# Patient Record
Sex: MALE | Race: BLACK OR AFRICAN AMERICAN | Employment: OTHER
[De-identification: names, ages, dates, MRNs, and addresses within clinical notes are randomized per-mention and may not be internally consistent; named-entity substitution may affect disease eponyms.]

---

## 2024-10-23 ENCOUNTER — APPOINTMENT (OUTPATIENT)
Facility: HOSPITAL | Age: 69
End: 2024-10-23
Attending: EMERGENCY MEDICINE
Payer: MEDICARE

## 2024-10-23 ENCOUNTER — HOSPITAL ENCOUNTER (INPATIENT)
Facility: HOSPITAL | Age: 69
LOS: 23 days | Discharge: HOME | End: 2024-11-15
Attending: EMERGENCY MEDICINE | Admitting: HOSPITALIST
Payer: MEDICARE

## 2024-10-23 ENCOUNTER — APPOINTMENT (OUTPATIENT)
Facility: HOSPITAL | Age: 69
End: 2024-10-23
Payer: MEDICARE

## 2024-10-23 DIAGNOSIS — C34.92 CARCINOMA OF LEFT LUNG (HCC): Primary | ICD-10-CM

## 2024-10-23 DIAGNOSIS — E87.6 HYPOKALEMIA: ICD-10-CM

## 2024-10-23 DIAGNOSIS — J96.01 ACUTE RESPIRATORY FAILURE WITH HYPOXEMIA: ICD-10-CM

## 2024-10-23 DIAGNOSIS — J90 LARGE PLEURAL EFFUSION: ICD-10-CM

## 2024-10-23 DIAGNOSIS — J91.0 MALIGNANT PLEURAL EFFUSION: ICD-10-CM

## 2024-10-23 DIAGNOSIS — J43.9 BULLOUS EMPHYSEMA (HCC): ICD-10-CM

## 2024-10-23 DIAGNOSIS — J18.9 PNEUMONIA OF LEFT LUNG DUE TO INFECTIOUS ORGANISM, UNSPECIFIED PART OF LUNG: ICD-10-CM

## 2024-10-23 DIAGNOSIS — J86.9 EMPYEMA LUNG (HCC): ICD-10-CM

## 2024-10-23 DIAGNOSIS — J18.9 SEPSIS DUE TO PNEUMONIA (HCC): ICD-10-CM

## 2024-10-23 DIAGNOSIS — A41.9 SEPSIS DUE TO PNEUMONIA (HCC): ICD-10-CM

## 2024-10-23 PROBLEM — D75.839 THROMBOCYTOSIS: Status: ACTIVE | Noted: 2024-10-23

## 2024-10-23 PROBLEM — I10 PRIMARY HYPERTENSION: Status: ACTIVE | Noted: 2024-10-23

## 2024-10-23 PROBLEM — D72.10 EOSINOPHILIA: Status: ACTIVE | Noted: 2024-10-23

## 2024-10-23 PROBLEM — E78.5 HLD (HYPERLIPIDEMIA): Status: ACTIVE | Noted: 2024-10-23

## 2024-10-23 PROBLEM — R65.20 SEVERE SEPSIS (HCC): Status: ACTIVE | Noted: 2024-10-23

## 2024-10-23 LAB
ALBUMIN SERPL-MCNC: 2.2 G/DL (ref 3.4–5)
ALBUMIN/GLOB SERPL: 0.4 (ref 0.8–1.7)
ALP SERPL-CCNC: 71 U/L (ref 45–117)
ALT SERPL-CCNC: 12 U/L (ref 16–61)
ANION GAP SERPL CALC-SCNC: 11 MMOL/L (ref 3–18)
APPEARANCE UR: CLEAR
APTT PPP: 35.8 SEC (ref 23–36.4)
AST SERPL-CCNC: 17 U/L (ref 10–38)
BACTERIA URNS QL MICRO: NEGATIVE /HPF
BASOPHILS # BLD: 0.2 K/UL (ref 0–0.1)
BASOPHILS NFR BLD: 1 % (ref 0–2)
BILIRUB SERPL-MCNC: 0.6 MG/DL (ref 0.2–1)
BILIRUB UR QL: NEGATIVE
BUN SERPL-MCNC: 12 MG/DL (ref 7–18)
BUN/CREAT SERPL: 14 (ref 12–20)
CALCIUM SERPL-MCNC: 8.9 MG/DL (ref 8.5–10.1)
CHLORIDE SERPL-SCNC: 106 MMOL/L (ref 100–111)
CO2 SERPL-SCNC: 24 MMOL/L (ref 21–32)
COLOR UR: YELLOW
CREAT SERPL-MCNC: 0.87 MG/DL (ref 0.6–1.3)
DIFFERENTIAL METHOD BLD: ABNORMAL
EOSINOPHIL # BLD: 5.5 K/UL (ref 0–0.4)
EOSINOPHIL NFR BLD: 29 % (ref 0–5)
ERYTHROCYTE [DISTWIDTH] IN BLOOD BY AUTOMATED COUNT: 12.6 % (ref 11.6–14.5)
FLUAV RNA SPEC QL NAA+PROBE: NOT DETECTED
FLUBV RNA SPEC QL NAA+PROBE: NOT DETECTED
GLOBULIN SER CALC-MCNC: 5.2 G/DL (ref 2–4)
GLUCOSE SERPL-MCNC: 109 MG/DL (ref 74–99)
GLUCOSE UR STRIP.AUTO-MCNC: NEGATIVE MG/DL
HCT VFR BLD AUTO: 33.6 % (ref 36–48)
HGB BLD-MCNC: 11.1 G/DL (ref 13–16)
HGB UR QL STRIP: ABNORMAL
IMM GRANULOCYTES # BLD AUTO: 0.1 K/UL (ref 0–0.04)
IMM GRANULOCYTES NFR BLD AUTO: 0 % (ref 0–0.5)
INR PPP: 1.2 (ref 0.9–1.1)
KETONES UR QL STRIP.AUTO: ABNORMAL MG/DL
LACTATE BLD-SCNC: 1.27 MMOL/L (ref 0.4–2)
LACTATE BLD-SCNC: 2.11 MMOL/L (ref 0.4–2)
LEUKOCYTE ESTERASE UR QL STRIP.AUTO: ABNORMAL
LYMPHOCYTES # BLD: 2.5 K/UL (ref 0.9–3.6)
LYMPHOCYTES NFR BLD: 13 % (ref 21–52)
MAGNESIUM SERPL-MCNC: 2 MG/DL (ref 1.6–2.6)
MCH RBC QN AUTO: 29.8 PG (ref 24–34)
MCHC RBC AUTO-ENTMCNC: 33 G/DL (ref 31–37)
MCV RBC AUTO: 90.1 FL (ref 78–100)
MONOCYTES # BLD: 1.5 K/UL (ref 0.05–1.2)
MONOCYTES NFR BLD: 8 % (ref 3–10)
NEUTS SEG # BLD: 9.1 K/UL (ref 1.8–8)
NEUTS SEG NFR BLD: 49 % (ref 40–73)
NITRITE UR QL STRIP.AUTO: NEGATIVE
NRBC # BLD: 0 K/UL (ref 0–0.01)
NRBC BLD-RTO: 0 PER 100 WBC
NT PRO BNP: 143 PG/ML (ref 0–900)
PH UR STRIP: 5 (ref 5–8)
PLATELET # BLD AUTO: 634 K/UL (ref 135–420)
PMV BLD AUTO: 8.7 FL (ref 9.2–11.8)
POTASSIUM SERPL-SCNC: 3.1 MMOL/L (ref 3.5–5.5)
PROCALCITONIN SERPL-MCNC: 0.21 NG/ML
PROT SERPL-MCNC: 7.4 G/DL (ref 6.4–8.2)
PROT UR STRIP-MCNC: 30 MG/DL
PROTHROMBIN TIME: 15.3 SEC (ref 11.9–14.9)
RBC # BLD AUTO: 3.73 M/UL (ref 4.35–5.65)
RBC #/AREA URNS HPF: NORMAL /HPF (ref 0–5)
SARS-COV-2 RNA RESP QL NAA+PROBE: NOT DETECTED
SODIUM SERPL-SCNC: 141 MMOL/L (ref 136–145)
SOURCE: NORMAL
SP GR UR REFRACTOMETRY: >1.03 (ref 1–1.03)
TROPONIN I SERPL HS-MCNC: 8 NG/L (ref 0–78)
TSH SERPL DL<=0.05 MIU/L-ACNC: 0.32 UIU/ML (ref 0.36–3.74)
UROBILINOGEN UR QL STRIP.AUTO: 1 EU/DL (ref 0.2–1)
WBC # BLD AUTO: 18.8 K/UL (ref 4.6–13.2)
WBC URNS QL MICRO: NORMAL /HPF (ref 0–4)

## 2024-10-23 PROCEDURE — 71045 X-RAY EXAM CHEST 1 VIEW: CPT

## 2024-10-23 PROCEDURE — 2580000003 HC RX 258: Performed by: EMERGENCY MEDICINE

## 2024-10-23 PROCEDURE — 96365 THER/PROPH/DIAG IV INF INIT: CPT

## 2024-10-23 PROCEDURE — 83605 ASSAY OF LACTIC ACID: CPT

## 2024-10-23 PROCEDURE — 6360000002 HC RX W HCPCS: Performed by: EMERGENCY MEDICINE

## 2024-10-23 PROCEDURE — 99285 EMERGENCY DEPT VISIT HI MDM: CPT

## 2024-10-23 PROCEDURE — 80053 COMPREHEN METABOLIC PANEL: CPT

## 2024-10-23 PROCEDURE — 71275 CT ANGIOGRAPHY CHEST: CPT

## 2024-10-23 PROCEDURE — 85730 THROMBOPLASTIN TIME PARTIAL: CPT

## 2024-10-23 PROCEDURE — 85025 COMPLETE CBC W/AUTO DIFF WBC: CPT

## 2024-10-23 PROCEDURE — 1100000003 HC PRIVATE W/ TELEMETRY

## 2024-10-23 PROCEDURE — 84484 ASSAY OF TROPONIN QUANT: CPT

## 2024-10-23 PROCEDURE — 96366 THER/PROPH/DIAG IV INF ADDON: CPT

## 2024-10-23 PROCEDURE — 6370000000 HC RX 637 (ALT 250 FOR IP): Performed by: PHYSICIAN ASSISTANT

## 2024-10-23 PROCEDURE — 6360000004 HC RX CONTRAST MEDICATION: Performed by: EMERGENCY MEDICINE

## 2024-10-23 PROCEDURE — 83880 ASSAY OF NATRIURETIC PEPTIDE: CPT

## 2024-10-23 PROCEDURE — 96368 THER/DIAG CONCURRENT INF: CPT

## 2024-10-23 PROCEDURE — 2580000003 HC RX 258: Performed by: PHYSICIAN ASSISTANT

## 2024-10-23 PROCEDURE — 93005 ELECTROCARDIOGRAM TRACING: CPT | Performed by: EMERGENCY MEDICINE

## 2024-10-23 PROCEDURE — 83735 ASSAY OF MAGNESIUM: CPT

## 2024-10-23 PROCEDURE — 84443 ASSAY THYROID STIM HORMONE: CPT

## 2024-10-23 PROCEDURE — 85610 PROTHROMBIN TIME: CPT

## 2024-10-23 PROCEDURE — 6370000000 HC RX 637 (ALT 250 FOR IP): Performed by: EMERGENCY MEDICINE

## 2024-10-23 PROCEDURE — 87636 SARSCOV2 & INF A&B AMP PRB: CPT

## 2024-10-23 PROCEDURE — 94761 N-INVAS EAR/PLS OXIMETRY MLT: CPT

## 2024-10-23 PROCEDURE — 2700000000 HC OXYGEN THERAPY PER DAY

## 2024-10-23 PROCEDURE — 84145 PROCALCITONIN (PCT): CPT

## 2024-10-23 PROCEDURE — 99223 1ST HOSP IP/OBS HIGH 75: CPT | Performed by: PHYSICIAN ASSISTANT

## 2024-10-23 PROCEDURE — 81001 URINALYSIS AUTO W/SCOPE: CPT

## 2024-10-23 PROCEDURE — 87040 BLOOD CULTURE FOR BACTERIA: CPT

## 2024-10-23 RX ORDER — ENOXAPARIN SODIUM 100 MG/ML
40 INJECTION SUBCUTANEOUS DAILY
Status: DISCONTINUED | OUTPATIENT
Start: 2024-10-24 | End: 2024-11-16 | Stop reason: HOSPADM

## 2024-10-23 RX ORDER — POTASSIUM CHLORIDE 1500 MG/1
40 TABLET, EXTENDED RELEASE ORAL PRN
Status: ACTIVE | OUTPATIENT
Start: 2024-10-23 | End: 2024-11-09

## 2024-10-23 RX ORDER — POTASSIUM CHLORIDE 1500 MG/1
40 TABLET, EXTENDED RELEASE ORAL
Status: COMPLETED | OUTPATIENT
Start: 2024-10-23 | End: 2024-10-23

## 2024-10-23 RX ORDER — VANCOMYCIN 1.75 G/350ML
1250 INJECTION, SOLUTION INTRAVENOUS
Status: DISCONTINUED | OUTPATIENT
Start: 2024-10-24 | End: 2024-10-24

## 2024-10-23 RX ORDER — ONDANSETRON 4 MG/1
4 TABLET, ORALLY DISINTEGRATING ORAL EVERY 8 HOURS PRN
Status: DISCONTINUED | OUTPATIENT
Start: 2024-10-23 | End: 2024-11-16 | Stop reason: HOSPADM

## 2024-10-23 RX ORDER — POTASSIUM CHLORIDE 7.45 MG/ML
10 INJECTION INTRAVENOUS PRN
Status: ACTIVE | OUTPATIENT
Start: 2024-10-23 | End: 2024-11-09

## 2024-10-23 RX ORDER — ACETAMINOPHEN 650 MG/1
650 SUPPOSITORY RECTAL EVERY 6 HOURS PRN
Status: DISCONTINUED | OUTPATIENT
Start: 2024-10-23 | End: 2024-11-16 | Stop reason: HOSPADM

## 2024-10-23 RX ORDER — BISACODYL 10 MG
10 SUPPOSITORY, RECTAL RECTAL DAILY PRN
Status: DISCONTINUED | OUTPATIENT
Start: 2024-10-23 | End: 2024-11-16 | Stop reason: HOSPADM

## 2024-10-23 RX ORDER — POTASSIUM CHLORIDE 1500 MG/1
40 TABLET, EXTENDED RELEASE ORAL ONCE
Status: DISCONTINUED | OUTPATIENT
Start: 2024-10-23 | End: 2024-10-23

## 2024-10-23 RX ORDER — FUROSEMIDE 10 MG/ML
40 INJECTION INTRAMUSCULAR; INTRAVENOUS
Status: COMPLETED | OUTPATIENT
Start: 2024-10-23 | End: 2024-10-23

## 2024-10-23 RX ORDER — SODIUM CHLORIDE 0.9 % (FLUSH) 0.9 %
5-40 SYRINGE (ML) INJECTION PRN
Status: DISCONTINUED | OUTPATIENT
Start: 2024-10-23 | End: 2024-11-16 | Stop reason: HOSPADM

## 2024-10-23 RX ORDER — IPRATROPIUM BROMIDE AND ALBUTEROL SULFATE 2.5; .5 MG/3ML; MG/3ML
1 SOLUTION RESPIRATORY (INHALATION)
Status: COMPLETED | OUTPATIENT
Start: 2024-10-23 | End: 2024-10-23

## 2024-10-23 RX ORDER — POLYETHYLENE GLYCOL 3350 17 G/17G
17 POWDER, FOR SOLUTION ORAL DAILY PRN
Status: DISCONTINUED | OUTPATIENT
Start: 2024-10-23 | End: 2024-11-16 | Stop reason: HOSPADM

## 2024-10-23 RX ORDER — ARFORMOTEROL TARTRATE 15 UG/2ML
15 SOLUTION RESPIRATORY (INHALATION)
Status: DISCONTINUED | OUTPATIENT
Start: 2024-10-23 | End: 2024-11-16 | Stop reason: HOSPADM

## 2024-10-23 RX ORDER — IOPAMIDOL 755 MG/ML
80 INJECTION, SOLUTION INTRAVASCULAR
Status: COMPLETED | OUTPATIENT
Start: 2024-10-23 | End: 2024-10-23

## 2024-10-23 RX ORDER — ATORVASTATIN CALCIUM 10 MG/1
10 TABLET, FILM COATED ORAL DAILY
COMMUNITY
Start: 2024-08-05

## 2024-10-23 RX ORDER — VANCOMYCIN 1.75 G/350ML
1250 INJECTION, SOLUTION INTRAVENOUS ONCE
Status: COMPLETED | OUTPATIENT
Start: 2024-10-23 | End: 2024-10-23

## 2024-10-23 RX ORDER — 0.9 % SODIUM CHLORIDE 0.9 %
500 INTRAVENOUS SOLUTION INTRAVENOUS ONCE
Status: COMPLETED | OUTPATIENT
Start: 2024-10-23 | End: 2024-10-23

## 2024-10-23 RX ORDER — MAGNESIUM SULFATE IN WATER 40 MG/ML
2000 INJECTION, SOLUTION INTRAVENOUS PRN
Status: DISCONTINUED | OUTPATIENT
Start: 2024-10-23 | End: 2024-11-16 | Stop reason: HOSPADM

## 2024-10-23 RX ORDER — ATORVASTATIN CALCIUM 10 MG/1
10 TABLET, FILM COATED ORAL NIGHTLY
Status: DISCONTINUED | OUTPATIENT
Start: 2024-10-24 | End: 2024-11-16 | Stop reason: HOSPADM

## 2024-10-23 RX ORDER — LOSARTAN POTASSIUM AND HYDROCHLOROTHIAZIDE 25; 100 MG/1; MG/1
1 TABLET ORAL DAILY
COMMUNITY
Start: 2024-08-05

## 2024-10-23 RX ORDER — BUDESONIDE 1 MG/2ML
1 INHALANT ORAL 2 TIMES DAILY
Status: DISCONTINUED | OUTPATIENT
Start: 2024-10-23 | End: 2024-11-16 | Stop reason: HOSPADM

## 2024-10-23 RX ORDER — SODIUM CHLORIDE 0.9 % (FLUSH) 0.9 %
5-40 SYRINGE (ML) INJECTION EVERY 12 HOURS SCHEDULED
Status: DISCONTINUED | OUTPATIENT
Start: 2024-10-23 | End: 2024-11-16 | Stop reason: HOSPADM

## 2024-10-23 RX ORDER — ACETAMINOPHEN 325 MG/1
650 TABLET ORAL EVERY 6 HOURS PRN
Status: DISCONTINUED | OUTPATIENT
Start: 2024-10-23 | End: 2024-11-16 | Stop reason: HOSPADM

## 2024-10-23 RX ORDER — SODIUM CHLORIDE 9 MG/ML
INJECTION, SOLUTION INTRAVENOUS PRN
Status: DISCONTINUED | OUTPATIENT
Start: 2024-10-23 | End: 2024-11-16 | Stop reason: HOSPADM

## 2024-10-23 RX ORDER — 0.9 % SODIUM CHLORIDE 0.9 %
500 INTRAVENOUS SOLUTION INTRAVENOUS ONCE
Status: DISCONTINUED | OUTPATIENT
Start: 2024-10-23 | End: 2024-10-23

## 2024-10-23 RX ORDER — ONDANSETRON 2 MG/ML
4 INJECTION INTRAMUSCULAR; INTRAVENOUS EVERY 6 HOURS PRN
Status: DISCONTINUED | OUTPATIENT
Start: 2024-10-23 | End: 2024-11-16 | Stop reason: HOSPADM

## 2024-10-23 RX ADMIN — FUROSEMIDE 40 MG: 10 INJECTION, SOLUTION INTRAMUSCULAR; INTRAVENOUS at 18:30

## 2024-10-23 RX ADMIN — SODIUM CHLORIDE 500 ML: 9 INJECTION, SOLUTION INTRAVENOUS at 16:45

## 2024-10-23 RX ADMIN — WATER 80 MG: 1 INJECTION INTRAMUSCULAR; INTRAVENOUS; SUBCUTANEOUS at 18:46

## 2024-10-23 RX ADMIN — PIPERACILLIN AND TAZOBACTAM 4500 MG: 4; .5 INJECTION, POWDER, FOR SOLUTION INTRAVENOUS at 14:44

## 2024-10-23 RX ADMIN — POTASSIUM CHLORIDE 40 MEQ: 1500 TABLET, EXTENDED RELEASE ORAL at 16:46

## 2024-10-23 RX ADMIN — POTASSIUM CHLORIDE 40 MEQ: 1500 TABLET, EXTENDED RELEASE ORAL at 22:36

## 2024-10-23 RX ADMIN — PIPERACILLIN AND TAZOBACTAM 3375 MG: 3; .375 INJECTION, POWDER, FOR SOLUTION INTRAVENOUS at 21:30

## 2024-10-23 RX ADMIN — IPRATROPIUM BROMIDE AND ALBUTEROL SULFATE 1 DOSE: .5; 3 SOLUTION RESPIRATORY (INHALATION) at 18:46

## 2024-10-23 RX ADMIN — IOPAMIDOL 80 ML: 755 INJECTION, SOLUTION INTRAVENOUS at 14:26

## 2024-10-23 RX ADMIN — Medication 500 ML: at 14:44

## 2024-10-23 RX ADMIN — VANCOMYCIN 1250 MG: 1.75 INJECTION, SOLUTION INTRAVENOUS at 15:22

## 2024-10-23 RX ADMIN — SODIUM CHLORIDE, PRESERVATIVE FREE 10 ML: 5 INJECTION INTRAVENOUS at 22:42

## 2024-10-23 ASSESSMENT — PAIN SCALES - GENERAL
PAINLEVEL_OUTOF10: 10
PAINLEVEL_OUTOF10: 0

## 2024-10-23 ASSESSMENT — PAIN - FUNCTIONAL ASSESSMENT: PAIN_FUNCTIONAL_ASSESSMENT: 0-10

## 2024-10-23 ASSESSMENT — PAIN DESCRIPTION - LOCATION: LOCATION: CHEST;BACK

## 2024-10-23 NOTE — ED PROVIDER NOTES
[facility] EMERGENCY DEPT  EMERGENCY DEPARTMENT HISTORY AND PHYSICAL EXAM      Pt Name: Delfin Shetty  MRN: [id number]  Birthdate 1955  Date of evaluation: 10/23/2024  Provider: Ricardo Harvey DO    CHIEF COMPLAINT       Chief Complaint   Patient presents with    Shortness of Breath    Chest Pain         HISTORY OF PRESENT ILLNESS   (Location/Symptom, Timing/Onset, Context/Setting, Quality, Duration, Modifying Factors, Severity)  Note limiting factors.   Delfin Shetty is a 68 y.o. male with past history of nicotine dependence who presents to the emergency department with chief complaint of worsening shortness of breath especially with exertion over the past 2 weeks.  He has associated cough and nonradiating chest pain.  He does state that the pain goes to his upper back as well.  He says that he sees his primary care provider a couple times a year.  He reports that he quit smoking about 6 weeks ago.  He denies any lung disease, no COPD or asthma to his knowledge.  He reports that he does take high blood pressure medication and cholesterol medication.  No dizziness, nausea, sweating, leg swelling or leg pain, flank pain, abdominal pain, focal weakness, numbness, and no other complaint.    The history is provided by the patient. No  was used.       Nursing Notes were reviewed.    REVIEW OF SYSTEMS    (2-9 systems for level 4, 10 or more for level 5)     Review of Systems   Constitutional:  Negative for chills and fever.   HENT:  Positive for congestion. Negative for rhinorrhea, sore throat and trouble swallowing.    Eyes:  Negative for visual disturbance.   Respiratory:  Positive for cough and shortness of breath. Negative for wheezing.    Cardiovascular:  Positive for chest pain. Negative for palpitations and leg swelling.   Gastrointestinal:  Negative for abdominal distention, abdominal pain, constipation, diarrhea, nausea and vomiting.   Endocrine: Negative for cold intolerance.  deficit.      Sensory: No sensory deficit.      Motor: No weakness.      Coordination: Coordination normal.      Comments: Strength 5 out of 5 and symmetric  No slurred speech, no facial droop  CN II to XII grossly intact  No nystagmus  NIH stroke scale score 0   Psychiatric:         Mood and Affect: Mood normal.         Behavior: Behavior normal.         Thought Content: Thought content normal.         DIAGNOSTIC RESULTS     EKG: All EKG's are interpreted by the Emergency Department Physician who either signs or Co-signs this chart in the absence of a cardiologist.        RADIOLOGY:   Non-plain film images such as CT, Ultrasound and MRI are read by the radiologist. Plain radiographic images are visualized and preliminarily interpreted by the emergency physician with the below findings:      Interpretation per the Radiologist below, if available at the time of this note:    CTA CHEST W WO CONTRAST   Final Result      No evidence of pulmonary embolus or right heart strain.      There is a huge relatively loculated subpulmonic pleural effusion in the lower   chest depressing an inverting the left hemidiaphragm and displace the abdominal   contents. There is complete atelectasis of the left lower lobe and much of the   lingula. There is a combination of atelectasis and peripheral consolidation   involving the left upper lobe with additional peripheral somewhat loculated   fluid. There is a somewhat masslike consolidation in the lateral left apex which   is somewhat for possible mass and should be monitored on follow-up.      Diffuse emphysematous and some bullous changes are seen in the right lung.      The mediastinal contents have been shifted completely into the right chest.   There is no definite evidence of mediastinal adenopathy but the left hilum is   distorted and could obscure some lymph nodes.         ===================   Note: [facility] maintains that all CT scans at their facilities   are

## 2024-10-23 NOTE — ED TRIAGE NOTES
Patient reports having chest/upper GI pain x2-3 weeks. States that he has also been shortness of breath in this time. States that also notes food has been getting stuck in his chest when eating.

## 2024-10-23 NOTE — ED NOTES
Assumed care of patient at this time. Patient resting comfortably on bed. No s/sx of acute distress. Remains on 3L O2 via nasal cannula, cardiac, BP and pulse ox monitors. IV site without any s/sx of infection/infiltration. Awaiting transfer to [facility]. Denies any needs at this time.

## 2024-10-24 ENCOUNTER — APPOINTMENT (OUTPATIENT)
Facility: HOSPITAL | Age: 69
End: 2024-10-24
Payer: MEDICARE

## 2024-10-24 PROBLEM — J90 LARGE PLEURAL EFFUSION: Status: ACTIVE | Noted: 2024-10-24

## 2024-10-24 PROBLEM — J18.9 PNEUMONIA OF LEFT LUNG DUE TO INFECTIOUS ORGANISM: Status: ACTIVE | Noted: 2024-10-24

## 2024-10-24 PROBLEM — J43.9 BULLOUS EMPHYSEMA (HCC): Status: ACTIVE | Noted: 2024-10-24

## 2024-10-24 PROBLEM — J86.9 EMPYEMA LUNG (HCC): Status: ACTIVE | Noted: 2024-10-24

## 2024-10-24 LAB
ANION GAP SERPL CALC-SCNC: 7 MMOL/L (ref 3–18)
APPEARANCE FLD: CLEAR
BASOPHILS # BLD: 0.1 K/UL (ref 0–0.1)
BASOPHILS NFR BLD: 0 % (ref 0–2)
BODY FLD TYPE: NORMAL
BUN SERPL-MCNC: 14 MG/DL (ref 7–18)
BUN/CREAT SERPL: 18 (ref 12–20)
CALCIUM SERPL-MCNC: 8.9 MG/DL (ref 8.5–10.1)
CHLORIDE SERPL-SCNC: 108 MMOL/L (ref 100–111)
CO2 SERPL-SCNC: 24 MMOL/L (ref 21–32)
COLOR FLD: YELLOW
CREAT SERPL-MCNC: 0.8 MG/DL (ref 0.6–1.3)
CYTOLOGY-NON GYN: NORMAL
DIFFERENTIAL METHOD BLD: ABNORMAL
EKG ATRIAL RATE: 140 BPM
EKG DIAGNOSIS: NORMAL
EKG P AXIS: 44 DEGREES
EKG P-R INTERVAL: 124 MS
EKG Q-T INTERVAL: 278 MS
EKG QRS DURATION: 60 MS
EKG QTC CALCULATION (BAZETT): 424 MS
EKG R AXIS: 57 DEGREES
EKG T AXIS: 14 DEGREES
EKG VENTRICULAR RATE: 140 BPM
EOSINOPHIL # BLD: 0.2 K/UL (ref 0–0.4)
EOSINOPHIL NFR BLD: 1 % (ref 0–5)
EOSINOPHIL NFR FLD MANUAL: 37 %
ERYTHROCYTE [DISTWIDTH] IN BLOOD BY AUTOMATED COUNT: 12.8 % (ref 11.6–14.5)
GLUCOSE FLD-MCNC: 90 MG/DL
GLUCOSE SERPL-MCNC: 133 MG/DL (ref 74–99)
HCT VFR BLD AUTO: 31.8 % (ref 36–48)
HGB BLD-MCNC: 10.1 G/DL (ref 13–16)
HIV 1+2 AB+HIV1 P24 AG SERPL QL IA: NONREACTIVE
HIV 1/2 RESULT COMMENT: NORMAL
IMM GRANULOCYTES # BLD AUTO: 0.1 K/UL (ref 0–0.04)
IMM GRANULOCYTES NFR BLD AUTO: 0 % (ref 0–0.5)
LDH FLD L TO P-CCNC: 303 U/L
LDH SERPL L TO P-CCNC: 168 U/L (ref 81–234)
LYMPHOCYTES # BLD: 1 K/UL (ref 0.9–3.6)
LYMPHOCYTES NFR BLD: 9 % (ref 21–52)
LYMPHOCYTES NFR FLD: 61 %
MCH RBC QN AUTO: 29.1 PG (ref 24–34)
MCHC RBC AUTO-ENTMCNC: 31.8 G/DL (ref 31–37)
MCV RBC AUTO: 91.6 FL (ref 78–100)
MONOCYTES # BLD: 0.4 K/UL (ref 0.05–1.2)
MONOCYTES NFR BLD: 3 % (ref 3–10)
MONOCYTES NFR FLD: 2 %
NEUTROPHILS NFR FLD: 0 % (ref 0–25)
NEUTS BAND # FLD: 0 %
NEUTS SEG # BLD: 10.6 K/UL (ref 1.8–8)
NEUTS SEG NFR BLD: 87 % (ref 40–73)
NRBC # BLD: 0 K/UL (ref 0–0.01)
NRBC BLD-RTO: 0 PER 100 WBC
NUC CELL # FLD: 1105 /CU MM
PH FLD: 7.4
PLATELET # BLD AUTO: 568 K/UL (ref 135–420)
PMV BLD AUTO: 9 FL (ref 9.2–11.8)
POTASSIUM SERPL-SCNC: 4.3 MMOL/L (ref 3.5–5.5)
PROCALCITONIN SERPL-MCNC: 0.22 NG/ML
PROT FLD-MCNC: 3.8 G/DL
PROT SERPL-MCNC: 6.8 G/DL (ref 6.4–8.2)
RBC # BLD AUTO: 3.47 M/UL (ref 4.35–5.65)
RBC # FLD: 5000 /CU MM
SODIUM SERPL-SCNC: 139 MMOL/L (ref 136–145)
SPECIMEN SOURCE FLD: ABNORMAL
SPECIMEN SOURCE FLD: NORMAL
WBC # BLD AUTO: 12.3 K/UL (ref 4.6–13.2)

## 2024-10-24 PROCEDURE — 85025 COMPLETE CBC W/AUTO DIFF WBC: CPT

## 2024-10-24 PROCEDURE — 88334 PATH CONSLTJ SURG CYTO XM EA: CPT

## 2024-10-24 PROCEDURE — 83986 ASSAY PH BODY FLUID NOS: CPT

## 2024-10-24 PROCEDURE — 0BBP3ZX EXCISION OF LEFT PLEURA, PERCUTANEOUS APPROACH, DIAGNOSTIC: ICD-10-PCS | Performed by: STUDENT IN AN ORGANIZED HEALTH CARE EDUCATION/TRAINING PROGRAM

## 2024-10-24 PROCEDURE — 87102 FUNGUS ISOLATION CULTURE: CPT

## 2024-10-24 PROCEDURE — 1100000003 HC PRIVATE W/ TELEMETRY

## 2024-10-24 PROCEDURE — 99223 1ST HOSP IP/OBS HIGH 75: CPT | Performed by: INTERNAL MEDICINE

## 2024-10-24 PROCEDURE — 6370000000 HC RX 637 (ALT 250 FOR IP): Performed by: PHYSICIAN ASSISTANT

## 2024-10-24 PROCEDURE — 88341 IMHCHEM/IMCYTCHM EA ADD ANTB: CPT

## 2024-10-24 PROCEDURE — 88112 CYTOPATH CELL ENHANCE TECH: CPT

## 2024-10-24 PROCEDURE — 83615 LACTATE (LD) (LDH) ENZYME: CPT

## 2024-10-24 PROCEDURE — 88342 IMHCHEM/IMCYTCHM 1ST ANTB: CPT

## 2024-10-24 PROCEDURE — 89051 BODY FLUID CELL COUNT: CPT

## 2024-10-24 PROCEDURE — 2580000003 HC RX 258: Performed by: PHYSICIAN ASSISTANT

## 2024-10-24 PROCEDURE — 87389 HIV-1 AG W/HIV-1&-2 AB AG IA: CPT

## 2024-10-24 PROCEDURE — 84157 ASSAY OF PROTEIN OTHER: CPT

## 2024-10-24 PROCEDURE — 99152 MOD SED SAME PHYS/QHP 5/>YRS: CPT

## 2024-10-24 PROCEDURE — 6370000000 HC RX 637 (ALT 250 FOR IP): Performed by: STUDENT IN AN ORGANIZED HEALTH CARE EDUCATION/TRAINING PROGRAM

## 2024-10-24 PROCEDURE — 88333 PATH CONSLTJ SURG CYTO XM 1: CPT

## 2024-10-24 PROCEDURE — 87070 CULTURE OTHR SPECIMN AEROBIC: CPT

## 2024-10-24 PROCEDURE — 6360000002 HC RX W HCPCS: Performed by: PHYSICIAN ASSISTANT

## 2024-10-24 PROCEDURE — 88360 TUMOR IMMUNOHISTOCHEM/MANUAL: CPT

## 2024-10-24 PROCEDURE — 71045 X-RAY EXAM CHEST 1 VIEW: CPT

## 2024-10-24 PROCEDURE — 88305 TISSUE EXAM BY PATHOLOGIST: CPT

## 2024-10-24 PROCEDURE — 84155 ASSAY OF PROTEIN SERUM: CPT

## 2024-10-24 PROCEDURE — 2700000000 HC OXYGEN THERAPY PER DAY

## 2024-10-24 PROCEDURE — 94640 AIRWAY INHALATION TREATMENT: CPT

## 2024-10-24 PROCEDURE — 6360000002 HC RX W HCPCS: Performed by: STUDENT IN AN ORGANIZED HEALTH CARE EDUCATION/TRAINING PROGRAM

## 2024-10-24 PROCEDURE — 0W9B30Z DRAINAGE OF LEFT PLEURAL CAVITY WITH DRAINAGE DEVICE, PERCUTANEOUS APPROACH: ICD-10-PCS | Performed by: STUDENT IN AN ORGANIZED HEALTH CARE EDUCATION/TRAINING PROGRAM

## 2024-10-24 PROCEDURE — 84145 PROCALCITONIN (PCT): CPT

## 2024-10-24 PROCEDURE — 87205 SMEAR GRAM STAIN: CPT

## 2024-10-24 PROCEDURE — 87206 SMEAR FLUORESCENT/ACID STAI: CPT

## 2024-10-24 PROCEDURE — 99233 SBSQ HOSP IP/OBS HIGH 50: CPT | Performed by: STUDENT IN AN ORGANIZED HEALTH CARE EDUCATION/TRAINING PROGRAM

## 2024-10-24 PROCEDURE — 82945 GLUCOSE OTHER FLUID: CPT

## 2024-10-24 PROCEDURE — 94761 N-INVAS EAR/PLS OXIMETRY MLT: CPT

## 2024-10-24 PROCEDURE — 36415 COLL VENOUS BLD VENIPUNCTURE: CPT

## 2024-10-24 PROCEDURE — 87116 MYCOBACTERIA CULTURE: CPT

## 2024-10-24 PROCEDURE — 93010 ELECTROCARDIOGRAM REPORT: CPT | Performed by: INTERNAL MEDICINE

## 2024-10-24 PROCEDURE — 80048 BASIC METABOLIC PNL TOTAL CA: CPT

## 2024-10-24 RX ORDER — FENTANYL CITRATE 50 UG/ML
INJECTION, SOLUTION INTRAMUSCULAR; INTRAVENOUS
Status: DISPENSED
Start: 2024-10-24 | End: 2024-10-24

## 2024-10-24 RX ORDER — FENTANYL CITRATE 50 UG/ML
INJECTION, SOLUTION INTRAMUSCULAR; INTRAVENOUS PRN
Status: COMPLETED | OUTPATIENT
Start: 2024-10-24 | End: 2024-10-24

## 2024-10-24 RX ORDER — MIDAZOLAM HYDROCHLORIDE 1 MG/ML
INJECTION, SOLUTION INTRAMUSCULAR; INTRAVENOUS
Status: DISPENSED
Start: 2024-10-24 | End: 2024-10-24

## 2024-10-24 RX ORDER — MIDAZOLAM HYDROCHLORIDE 2 MG/2ML
INJECTION, SOLUTION INTRAMUSCULAR; INTRAVENOUS PRN
Status: COMPLETED | OUTPATIENT
Start: 2024-10-24 | End: 2024-10-24

## 2024-10-24 RX ORDER — MULTIVITAMIN WITH IRON
1 TABLET ORAL DAILY
Status: DISCONTINUED | OUTPATIENT
Start: 2024-10-24 | End: 2024-11-16 | Stop reason: HOSPADM

## 2024-10-24 RX ADMIN — FENTANYL CITRATE 25 MCG: 50 INJECTION, SOLUTION INTRAMUSCULAR; INTRAVENOUS at 10:41

## 2024-10-24 RX ADMIN — IPRATROPIUM BROMIDE 0.5 MG: 0.5 SOLUTION RESPIRATORY (INHALATION) at 16:57

## 2024-10-24 RX ADMIN — PIPERACILLIN AND TAZOBACTAM 3375 MG: 3; .375 INJECTION, POWDER, LYOPHILIZED, FOR SOLUTION INTRAVENOUS at 21:18

## 2024-10-24 RX ADMIN — PIPERACILLIN AND TAZOBACTAM 3375 MG: 3; .375 INJECTION, POWDER, LYOPHILIZED, FOR SOLUTION INTRAVENOUS at 13:09

## 2024-10-24 RX ADMIN — MIDAZOLAM HYDROCHLORIDE 0.5 MG: 1 INJECTION, SOLUTION INTRAMUSCULAR; INTRAVENOUS at 11:13

## 2024-10-24 RX ADMIN — MIDAZOLAM HYDROCHLORIDE 0.5 MG: 1 INJECTION, SOLUTION INTRAMUSCULAR; INTRAVENOUS at 10:51

## 2024-10-24 RX ADMIN — IPRATROPIUM BROMIDE 0.5 MG: 0.5 SOLUTION RESPIRATORY (INHALATION) at 13:01

## 2024-10-24 RX ADMIN — ARFORMOTEROL TARTRATE 15 MCG: 15 SOLUTION RESPIRATORY (INHALATION) at 09:27

## 2024-10-24 RX ADMIN — BUDESONIDE 1 MG: 1 SUSPENSION RESPIRATORY (INHALATION) at 19:51

## 2024-10-24 RX ADMIN — FENTANYL CITRATE 25 MCG: 50 INJECTION, SOLUTION INTRAMUSCULAR; INTRAVENOUS at 11:13

## 2024-10-24 RX ADMIN — FENTANYL CITRATE 25 MCG: 50 INJECTION, SOLUTION INTRAMUSCULAR; INTRAVENOUS at 10:47

## 2024-10-24 RX ADMIN — MIDAZOLAM HYDROCHLORIDE 0.5 MG: 1 INJECTION, SOLUTION INTRAMUSCULAR; INTRAVENOUS at 11:01

## 2024-10-24 RX ADMIN — THERA TABS 1 TABLET: TAB at 16:14

## 2024-10-24 RX ADMIN — ACETAMINOPHEN 325MG 650 MG: 325 TABLET ORAL at 16:52

## 2024-10-24 RX ADMIN — SODIUM CHLORIDE, PRESERVATIVE FREE 10 ML: 5 INJECTION INTRAVENOUS at 21:11

## 2024-10-24 RX ADMIN — IPRATROPIUM BROMIDE 0.5 MG: 0.5 SOLUTION RESPIRATORY (INHALATION) at 09:26

## 2024-10-24 RX ADMIN — BUDESONIDE 1 MG: 1 SUSPENSION RESPIRATORY (INHALATION) at 09:27

## 2024-10-24 RX ADMIN — IPRATROPIUM BROMIDE 0.5 MG: 0.5 SOLUTION RESPIRATORY (INHALATION) at 19:51

## 2024-10-24 RX ADMIN — ARFORMOTEROL TARTRATE 15 MCG: 15 SOLUTION RESPIRATORY (INHALATION) at 19:51

## 2024-10-24 RX ADMIN — PIPERACILLIN AND TAZOBACTAM 3375 MG: 3; .375 INJECTION, POWDER, LYOPHILIZED, FOR SOLUTION INTRAVENOUS at 05:36

## 2024-10-24 RX ADMIN — ATORVASTATIN CALCIUM 10 MG: 10 TABLET, FILM COATED ORAL at 21:11

## 2024-10-24 RX ADMIN — SODIUM CHLORIDE, PRESERVATIVE FREE 10 ML: 5 INJECTION INTRAVENOUS at 08:00

## 2024-10-24 RX ADMIN — VANCOMYCIN 1250 MG: 1.75 INJECTION, SOLUTION INTRAVENOUS at 05:42

## 2024-10-24 ASSESSMENT — PAIN SCALES - GENERAL
PAINLEVEL_OUTOF10: 0
PAINLEVEL_OUTOF10: 3
PAINLEVEL_OUTOF10: 0
PAINLEVEL_OUTOF10: 0

## 2024-10-24 ASSESSMENT — PAIN DESCRIPTION - LOCATION: LOCATION: RIB CAGE

## 2024-10-24 ASSESSMENT — PAIN DESCRIPTION - DESCRIPTORS: DESCRIPTORS: ACHING

## 2024-10-24 ASSESSMENT — PAIN DESCRIPTION - ONSET: ONSET: PROGRESSIVE

## 2024-10-24 ASSESSMENT — PAIN DESCRIPTION - ORIENTATION: ORIENTATION: LEFT

## 2024-10-24 ASSESSMENT — PAIN DESCRIPTION - PAIN TYPE: TYPE: ACUTE PAIN

## 2024-10-24 ASSESSMENT — PAIN DESCRIPTION - FREQUENCY: FREQUENCY: INTERMITTENT

## 2024-10-24 ASSESSMENT — PAIN - FUNCTIONAL ASSESSMENT: PAIN_FUNCTIONAL_ASSESSMENT: PREVENTS OR INTERFERES SOME ACTIVE ACTIVITIES AND ADLS

## 2024-10-24 NOTE — CONSULTS
Infectious Disease Consultation Note        Reason: Evaluate for severe sepsis/empyema    Current abx Prior abx   Pip/tazo, vancomycin since 10/23/24      Lines:       Assessment :   68 y.o. male with a PMHx of tobacco abuse, HTN, HLD who presented to the ED on 10/23/24 with c/o SOB associated with cough and chest pain ongoing for the past 2 weeks.    I agree that initial clinical presentation is concerning for sepsis/empyema.  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    Status post thoracentesis, chest tube insertion on 10/24/2024.  No neutrophils seen on pleural fluid argues against empyema.  Pleural fluid cultures 10/24-pending    Recommendations:    Continue with piperacillin/tazobactam.  Discontinue vancomycin.  Follow-up pleural fluid cultures.  Modify antibiotics accordingly  Management of chest tube per pulmonary/IR  Follow-up results of pleural biopsy  Further recommendations based on above test results, clinical course    Thank you for consultation request. Above plan was discussed in details with patient, primary team. Please call me if any further questions or concerns. Will continue to participate in the care of this patient.    HPI:     68 y.o. male with a PMHx of tobacco abuse, HTN, HLD who presented to the ED on 10/23/24 with c/o SOB associated with cough and chest discomfort.    Patient states that he has noted gradually worsening shortness of breath since the past 3 months.  He did not seek medical attention.  It progressively worsened in the past 2 weeks.  He started getting more short of breath with minimal exertion.  Hence came to the emergency room on 10/23.In the ED, RR 30-40, -140s, BP ranging 155/75 to 104/60. He was initially 88% on room air, he was started on nasal cannula. Labs  nasopharyngeal specimens.         Blood Culture 1 [[id number]] Collected: 10/23/24 1330    Order Status: Completed Specimen: Blood Updated: 10/24/24 0749     Special Requests NO SPECIAL REQUESTS        Culture NO GROWTH AFTER 16 HOURS                   RADIOLOGY:    All available imaging studies/reports in [facility] for this admission were reviewed      Disclaimer: Sections of this note are dictated utilizing voice recognition software, which may have resulted in some phonetic based errors in grammar and contents. Even though attempts were made to correct all the mistakes, some may have been missed, and remained in the body of the document. If questions arise, please contact our department.    Dr. Adelia Recinos, Infectious Disease Specialist  [phone]  October 24, 2024  10:16 AM

## 2024-10-24 NOTE — PROGRESS NOTES
Pt not seen for skilled PT due to:    Discussed with Dr. Geronimo. Plans for chest tube placement in setting of symptomatic L pleural effusion. Additionally, tachycardic at rest on HFNC. Will hold PT evaluation s/p chest tube placement.    Will f/u later as schedule allows. Thank you.  Kassi Mccarthy, SEGUNDOT

## 2024-10-24 NOTE — PROGRESS NOTES
New OT orders received and chart reviewed. Unable to see pt for OT evaluation at this time due to:    Holding OT today per MD, pt is pending chest tube placement, noted to be tachycardic at rest with -110 bpm, on HFNC.    Will follow up later as pt's schedule allows. Thank you for this referral.    Gertrudis Grajeda MS, OTR/L

## 2024-10-24 NOTE — ED NOTES
TRANSFER - OUT REPORT:    Verbal report given to Sarah De Los Santos RN on Delfin Shetty  being transferred to [facility] for routine progression of patient care       Report consisted of patient's Situation, Background, Assessment and   Recommendations(SBAR).     Information from the following report(s) ED Encounter Summary, Intake/Output, MAR, and Recent Results was reviewed with the receiving nurse.    Brandon Fall Assessment:    Presents to emergency department  because of falls (Syncope, seizure, or loss of consciousness): No  Age > 70: No  Altered Mental Status, Intoxication with alcohol or substance confusion (Disorientation, impaired judgment, poor safety awaremess, or inability to follow instructions): No  Impaired Mobility: Ambulates or transfers with assistive devices or assistance; Unable to ambulate or transer.: No  Nursing Judgement: Yes          Lines:   Peripheral IV 10/23/24 Left Antecubital (Active)        Opportunity for questions and clarification was provided.      Patient transported with:  Monitor and O2 @ 3lpm

## 2024-10-24 NOTE — PROGRESS NOTES
TRANSFER - OUT REPORT:    Verbal report given to Sheryl RAMIREZ on [facility]  being transferred to  for routine post-op       Report consisted of patient's Situation, Background, Assessment and   Recommendations(SBAR).     Information from the following report(s) Nurse Handoff Report was reviewed with the receiving nurse.           Lines:   Peripheral IV 10/23/24 Left Antecubital (Active)   Site Assessment Clean, dry & intact 10/24/24 0800   Line Status Capped;Flushed 10/24/24 0800   Line Care Cap changed;Connections checked and tightened;Ports disinfected 10/24/24 0800   Phlebitis Assessment No symptoms 10/24/24 0800   Infiltration Assessment 0 10/24/24 0800   Alcohol Cap Used Yes 10/24/24 0800   Dressing Status Clean, dry & intact 10/24/24 0800   Dressing Type Transparent 10/24/24 0800       Peripheral IV 10/24/24 Right Antecubital (Active)   Site Assessment Clean, dry & intact 10/24/24 0800   Line Status Infusing;Flushed 10/24/24 0800   Line Care Cap changed;Connections checked and tightened;Ports disinfected 10/24/24 0800   Phlebitis Assessment No symptoms 10/24/24 0800   Infiltration Assessment 0 10/24/24 0800   Alcohol Cap Used Yes 10/24/24 0800   Dressing Status Clean, dry & intact 10/24/24 0800   Dressing Type Transparent 10/24/24 0800        Opportunity for questions and clarification was provided.      Patient transported with:  Monitor, Registered Nurse, and Tech

## 2024-10-24 NOTE — CARE COORDINATION
10/24/24 1544   Service Assessment   Patient Orientation Alert and Oriented;Person;Place;Situation   Cognition Alert   History Provided By Patient   Primary Caregiver Self   Support Systems Spouse/Significant Other;Family Members   Patient's Healthcare Decision Maker is: Legal Next of Kin   PCP Verified by CM Yes   Last Visit to PCP Within last 3 months   Prior Functional Level Independent in ADLs/IADLs   Current Functional Level Independent in ADLs/IADLs   Can patient return to prior living arrangement Yes   Ability to make needs known: Good   Family able to assist with home care needs: Yes   Financial Resources Medicare   Community Resources None   Social/Functional History   Lives With Alone   Type of Home   (10x10 shed)   Home Layout One level   Home Access Stairs to enter without rails   Entrance Stairs - Number of Steps 2   Bathroom Shower/Tub Tub only   Bathroom Toilet Standard   Bathroom Equipment Shower chair   Bathroom Accessibility Accessible   Home Equipment None   Receives Help From Family   ADL Assistance Independent   Homemaking Assistance Independent   Homemaking Responsibilities Yes   Ambulation Assistance Independent   Transfer Assistance Independent   Active  No   Patient's  Info family transports   Occupation On disability   Discharge Planning   Type of Residence   (10x10 shed)   Living Arrangements Alone   Current Services Prior To Admission None   DME Ordered? No   Potential Assistance Purchasing Medications No   History of falls? 0   Services At/After Discharge   Transition of Care Consult (CM Consult) Discharge Planning   Services At/After Discharge None    Resource Information Provided? No   Mode of Transport at Discharge Other (see comment)  (Family will transport)   Confirm Follow Up Transport Self   Condition of Participation: Discharge Planning   The Plan for Transition of Care is related to the following treatment goals: Dispo home with family to transport

## 2024-10-24 NOTE — PROCEDURES
RADIOLOGY POST PROCEDURE NOTE     October 24, 2024       11:33 AM     Preoperative Diagnosis:   Left pleural mass/metastatic disease, left pleural effusion    Postoperative Diagnosis:  Same.    :  Oc Gramajo MD    Assistant:  None.    Type of Anesthesia: 1% plain lidocaine, moderate sedation    Procedure/Description:  CT guided left pleural mass biopsy, CT guided left chest tube placement    Findings:   Six 20 G cores obtained from left pleural mass, pathology present.     16 Fr left chest tube placed for large left effusion. Approximately 130 mL of cloudy yellow fluid was aspirated with sample sent for evaluation.    Estimated blood Loss:  Minimal    Specimen Removed:  yes    Blood transfusions:  None.    Implants:  None.    Complications: None    Condition: Stable    Discharge Plan:   Return to nursing unit. 3 hours bedrest.   Please only allow 1000 mL of pleural fluid to drain via the chest tube initially to help limit reexpansion pulmonary edema. Chest tube management per primary service.     OC GRAMAJO MD

## 2024-10-24 NOTE — PROGRESS NOTES
[facility]   Pharmacy Pharmacokinetic Monitoring Service - Vancomycin    Indication:  empyema  Target Concentration: Goal AUC/KEVYN 400-600 mg*hr/L  Day of Therapy: 2  Additional Antimicrobials: Piperacillin/Tazobactam    Pertinent Laboratory Values:   Temp: 98.5 °F (36.9 °C), Weight - Scale: 58.5 kg (128 lb 15.5 oz)  Recent Labs     10/23/24  1315 10/24/24  0103   CREATININE 0.87 0.80   BUN 12 14   WBC 18.8* 12.3   PROCAL 0.21 0.22       Estimated Creatinine Clearance: 72 mL/min (based on SCr of 0.8 mg/dL).    Pertinent Cultures:  Culture Date Source Results   10/23 blood NGTD   MRSA Nasal Swab: Not ordered. Order placed by pharmacy, awaiting results    Assessment:  Date/Time Current Dose Concentration Timing of Concentration (h) AUC   10/23 1,250mg x1 - - -   10/24 1,250mg q18h - - -     Note: Serum concentrations collected for AUC dosing may appear elevated if collected in close proximity to the dose administered, this is not necessarily an indication of toxicity    Plan:  Continue vancomycin 1,250mg q18h  Projected AUCss/T = 491/11.2  Renal labs as indicated   Vancomycin concentration ordered for  10/25 @ 0600  Pharmacy will continue to monitor patient and adjust therapy as indicated    Thank you for the consult,  Blanquita Bowman RPH  10/24/2024

## 2024-10-24 NOTE — CONSULTS
[facility]  Pulmonary, Critical Care, and Sleep Medicine    Initial Patient Consult    Name: Delfin Shetty MRN: [id number]   : 1955 Hospital: [facility]   Date: 10/24/2024        IMPRESSION:   Large L pleural effusion with loculations and tension contralateral mediastinal shift. Concern for mesothelioma given appearance of pleura or metastatic lung cancer given mass like consolidation L apex. 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 Pleural biopsy with concerns for mesothelioma   Caution with withdrawing large volumes at one time due to concerns with reexpansion pulmonary edema. Chest tube currently clamped and should remain so overnight. Will unclamp in am. Discussed with bedside nurse  Titrate supplemental O2 to saturation greater than 90%  Assess home O2 needs prior to discharge  Bronchodilators prn  Baseline PFT as outpatient  Encourage IS and bronchial hygiene  Empiric antibiotics Zosyn/Vanco and streamline to cultures  Received 1 dose of systemic steroids in ED. No indication for continued steroid use  Prophylaxis issues per primary team  Further intervention pending thoracentesis and pleural biopsy results, including intrapleural lytics     Subjective:     This patient has been seen and evaluated at the request of Dr. Harvey for pleural effusion. Patient is a 68 y.o. male admitted with shortness of breath, increased cough and chest pain x 2 weeks. Chest pain with pleuritic component. Other symptoms include

## 2024-10-24 NOTE — CONSULTS
Interventional Radiology Consult Note  Patient: Delfin Shetty               Sex: male          DOA: 10/23/2024       YOB: 1955      Age:  68 y.o.        LOS:  LOS: 1 day              Assessment   Imaging concerning for metastatic disease - mesothelioma v lung cancer with pleural mets  Large left loculated pleural effusion  Unintentional weight loss  COPD    Left pleural biopsy with left chest tube placement is indicated to aid in diagnostics and guide further management as well as to alleviate symptomatic left pleural effusion.    Case and images reviewed by Dr. Gramajo. Discussed with Dr. Geronimo.    Plan     Image guided left pleural biopsy and left chest tube placement with moderate sedation as schedule allows 10/24/24    Specimen orders in place per referring MD.     Thank you,  Arlene Phillips, PA  [phone]    HPI:     Consult for evaluation of large left pleural effusion received from Dr. Geronimo and reviewed with Dr. Gramajo.    Delfin Shetty is a 68 y.o. male with a PMH of tobacco use, HTN, HLD who presented to [facility] on 10/23/24 for dyspnea with cough and chest pain.  ED evaluation was significant for CT 10/23/24 showing large left pleural effusion. Labs were significant for WBCs 18.8. Patient was admitted for further evaluation and management and placed on supplemental oxygen with SpO2 88% on arrival.  Not on blood thinning medication.     Today the patient reports ongoing dry cough, epigastric/low anterior \"chest pain,\" palpitations x 1 week, unintentional weight loss of over 30 pounds in the last month due to sensation of fullness. Denies nausea, vomiting, HA, dizziness, hemoptysis, or current dyspnea on supplemental O2.    The anticipated procedure was discussed in detail including risk of injury, infection, and bleeding. All questions were answered and concerns addressed. Informed consents were obtained.    Past Medical History:   Diagnosis Date    HLD (hyperlipidemia) 10/23/2024  NAD  Respiratory: increased work of breathing on NC  Cardiovascular: tachycardic and regular  Gastrointestinal: Soft, NT/ND  Extremities: Moves all four, clubbing present  Skin: Warm and dry    Labs Reviewed:  CMP:   Lab Results   Component Value Date/Time    Sodium 139 10/24/2024 01:03 AM    Potassium 4.3 10/24/2024 01:03 AM    Chloride 108 10/24/2024 01:03 AM    CO2 24 10/24/2024 01:03 AM    BUN 14 10/24/2024 01:03 AM    Magnesium 2.0 10/23/2024 01:15 PM    Globulin 5.2 (H) 10/23/2024 01:15 PM    ALT 12 (L) 10/23/2024 01:15 PM   ]    CBC:   Lab Results   Component Value Date/Time    WBC 12.3 10/24/2024 01:03 AM    Hemoglobin 10.1 (L) 10/24/2024 01:03 AM    Hematocrit 31.8 (L) 10/24/2024 01:03 AM    Platelets 568 (H) 10/24/2024 01:03 AM   ]    COAGS:   Lab Results   Component Value Date/Time    APTT 35.8 10/23/2024 01:15 PM    INR 1.2 (H) 10/23/2024 01:15 PM   ]    Blood Thinners:  None     ASA 3 Mallampati 2

## 2024-10-24 NOTE — PLAN OF CARE
Problem: Discharge Planning  Goal: Discharge to home or other facility with appropriate resources  Outcome: Progressing  Flowsheets (Taken 10/23/2024 2115)  Discharge to home or other facility with appropriate resources:   Identify barriers to discharge with patient and caregiver   Identify discharge learning needs (meds, wound care, etc)     Problem: Pain  Goal: Verbalizes/displays adequate comfort level or baseline comfort level  Outcome: Progressing     Problem: Safety - Adult  Goal: Free from fall injury  Outcome: Progressing     Problem: Skin/Tissue Integrity  Goal: Absence of new skin breakdown  Description: 1.  Monitor for areas of redness and/or skin breakdown  2.  Assess vascular access sites hourly  3.  Every 4-6 hours minimum:  Change oxygen saturation probe site  4.  Every 4-6 hours:  If on nasal continuous positive airway pressure, respiratory therapy assess nares and determine need for appliance change or resting period.  Outcome: Progressing     Problem: Respiratory - Adult  Goal: Achieves optimal ventilation and oxygenation  Outcome: Progressing     Problem: Cardiovascular - Adult  Goal: Maintains optimal cardiac output and hemodynamic stability  Outcome: Progressing  Goal: Absence of cardiac dysrhythmias or at baseline  Outcome: Progressing     Problem: Skin/Tissue Integrity - Adult  Goal: Skin integrity remains intact  Outcome: Progressing  Goal: Incisions, wounds, or drain sites healing without S/S of infection  Outcome: Progressing  Goal: Oral mucous membranes remain intact  Outcome: Progressing     Problem: Musculoskeletal - Adult  Goal: Return mobility to safest level of function  Outcome: Progressing  Goal: Maintain proper alignment of affected body part  Outcome: Progressing  Goal: Return ADL status to a safe level of function  Outcome: Progressing     Problem: Gastrointestinal - Adult  Goal: Maintains or returns to baseline bowel function  Outcome: Progressing  Goal: Maintains adequate

## 2024-10-24 NOTE — PROGRESS NOTES
0730:  Report received, care assumed.  0800:  Assessment completed. Bedrest on High flow NC. NPO for procedure today. AAOx4. Denies pain. Verbalizes understanding plan of care.   0930: TRANSFER - OUT REPORT:  Verbal report given to ASHLEY Jade on [facility]  being transferred to Angio lab for ordered procedure     Report consisted of patient's Situation, Background, Assessment and   Recommendations(SBAR).   Information from the following report(s) Nurse Handoff Report was reviewed with the receiving nurse.     Lines:   Peripheral IV 10/23/24 Left Antecubital (Active)   Site Assessment Clean, dry & intact 10/24/24 0351   Line Status Flushed;Normal saline locked 10/24/24 0351   Line Care Cap changed;Connections checked and tightened 10/24/24 0351   Phlebitis Assessment No symptoms 10/24/24 0351   Infiltration Assessment 0 10/24/24 0351   Alcohol Cap Used Yes 10/24/24 0351   Dressing Status Clean, dry & intact 10/24/24 0351   Dressing Type Transparent 10/24/24 0351       Peripheral IV 10/24/24 Right Antecubital (Active)    Opportunity for questions and clarification was provided.    Patient transported with:  Monitor and Registered Nurse 100% NRB.    0945:  RT changed oxygen delivery from HF to regular NC at 4L/min. Pt verbalize understanding to call RN for any discomforts/ sob. Awaiting transport to Angio lab  1030: Transported by bed with monitor, oxygen, RN to CT lab. Report updated to ASHLEY Jade.  1200:  TRANSFER - IN REPORT:  Verbal report received from ASHLEY Hawkins on [facility]  being received from CT Angio procedure for routine post-op    Report consisted of patient's Situation, Background, Assessment and   Recommendations(SBAR).   Information from the following report(s) Nurse Handoff Report was reviewed with the receiving nurse.  Opportunity for questions and clarification was provided.    Assessment completed upon patient's arrival to unit and care assumed.   1220:  Settled into room. Left #16fr Chest

## 2024-10-24 NOTE — CONSULTS
Comprehensive Nutrition Assessment    Type and Reason for Visit:  Initial, Consult    Nutrition Recommendations/Plan:   Continue current diet as tolerated.  Order Ensure Plus High Protein (each provides 350 kcal, 20g protein) TID.  Encourage PO intake.  Recommend/order MVI/min supplementation daily due to reported poor PO.  Daily wts.  Continue to monitor tolerance of PO, compliance of oral supplements, weight, labs, and plan of care during admission.     Malnutrition Assessment:  Malnutrition Status:  Insufficient data (10/24/24 1527)    Context:  Chronic Illness       Nutrition History and Allergies:      Past Medical History:   Diagnosis Date    HLD (hyperlipidemia) 10/23/2024    Primary hypertension 10/23/2024     Weight Hx: Limited hx available. If chart review, reported wt loss accurate, wt loss of 18.9% x 3 months - significant  Wt Readings from Last 10 Encounters:   10/24/24 58.5 kg (128 lb 15.5 oz)     NFPE: unable to perform NFPE. Food Allergies: NKFA    Nutrition Assessment:    Admitted for c/o SOB, cough and chest pain. Per chart review, pt reports not eating well recently due to early satiety with reported 30 lb unintentional wt loss x 3 months. Pt seen for - Consult: Poor intake/appetite. Pt was off unit at time of visit, down at radiology. Noted PO diet was advanced. Will add oral supplements to increase calorie/protein intake opportunity. Attempt to obtain subjective information as able.    Nutrition Related Findings:    Pertinent Meds:  Reviewed  Pertinent Labs:  Recent Labs     10/23/24  1315 10/24/24  0103   GLUCOSE 109* 133*   BUN 12 14   CREATININE 0.87 0.80    139   K 3.1* 4.3    108   CO2 24 24   CALCIUM 8.9 8.9   MG 2.0  --      No results for input(s): \"POCGLU\" in the last 72 hours.    Last BM: Last BM (including prior to admit): 10/22/24     Skin: Wound Type: None       Edema: Edema: None     Current Nutrition Intake & Therapies:    Average Meal Intake:  (PO diet just

## 2024-10-24 NOTE — PROGRESS NOTES
Patient transitioned from hfnc to 4 liters cannula   10/24/24 0944   Oxygen Therapy/Pulse Ox   O2 Device Nasal cannula   O2 Flow Rate (L/min) 4 L/min   FiO2  36 %   SpO2 96 %

## 2024-10-24 NOTE — PROGRESS NOTES
[facility]  Progress Note    Patient: Delfin Shetty Age: 68 y.o. : 1955 MR#: [id number] SSN: [id number]  Date/Time: 10/24/2024    Subjective:   Subjective   No acute events overnight, no new concerns or complaints, vitals stable.  Received chest tube today by IR    Review of Systems   Constitutional: Negative for fever.      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  Had a total of 1 L out at procedure and 1 more liter out while transferring patient back to room.  Tube clamped after that.  Please continue to have chest tube clamped at this time, do not want to pull significant volumes of fluid given the possibility of reexpansion flash pulmonary edema  Pulmonology on board to assist with multiple pulmonary issues including chest tube, appreciate their expertise as always  Infectious disease on board given initial presentation and concern for sepsis as a possible cause.  Continue Zosyn for time being given possibility of postobstructive pneumonia.    Follow-up pleural cultures/biopsies and fluid studies    Wean oxygen as tolerated    Disposition: Expected location: Home     Expected discharge date: 10/28/2024      Case discussed with:  [x]Patient  []Family  [x]Nursing  [x]Case Management  DVT Prophylaxis:  [x]Lovenox  []Hep SQ  [x]SCDs  []Coumadin   []On Heparin gtt   [] DOAC    Objective:   Objective:  General Appearance:  In no acute distress, not in pain, uncomfortable and ill-appearing.    Vital signs: (most recent): Blood pressure (!) 154/83,

## 2024-10-24 NOTE — PROGRESS NOTES
conducted an initial consultation and Spiritual Assessment for Delfin Shetty, who is a 68 y.o.,male. Patient's Primary Language is: English.   According to the patient's EMR Buddhism Affiliation is: Scientologist.     The reason the Patient came to the hospital is:   Patient Active Problem List    Diagnosis Date Noted    Severe sepsis (HCC) 10/23/2024    Primary hypertension 10/23/2024    HLD (hyperlipidemia) 10/23/2024    Acute respiratory failure with hypoxia 10/23/2024    Hypokalemia 10/23/2024    Eosinophilia 10/23/2024    Thrombocytosis 10/23/2024        The  provided the following Interventions:  Initiated a relationship of care and support. Patient alert and calm. Very receptive. Patient has issues with family but feels he is well taken care of. Lie is meaningful. Patient feeling better since admission. Explored issues of marcella, belief, spirituality and Cheondoism/ritual needs while hospitalized.  Listened empathically.  Provided information about Spiritual Care Services. Patient expressed gratitude for support and visit.   Offered prayer and assurance of continued prayers on patient's behalf.   Chart reviewed.     Cesar Booker   Staff   Spiritual Health  [phone]    Spiritual Health History and Assessment/Progress Note  [facility]    Initial Encounter,  ,  ,      Name: Delfin Shetty MRN: [id number]    Age: 68 y.o.     Sex: male   Language: English   Baptism: Scientologist   Severe sepsis (HCC)     Date: 10/24/2024            Total Time Calculated: 8 min              Spiritual Assessment began in [address]        Referral/Consult From: Rounding   Encounter Overview/Reason: Initial Encounter  Service Provided For: Patient    Marcella, Belief, Meaning:   Patient identifies as spiritual and has beliefs or practices that help with coping during difficult times  Family/Friends No family/friends present      Importance and Influence:  Patient has

## 2024-10-25 ENCOUNTER — APPOINTMENT (OUTPATIENT)
Facility: HOSPITAL | Age: 69
End: 2024-10-25
Payer: MEDICARE

## 2024-10-25 PROBLEM — J96.01 ACUTE RESPIRATORY FAILURE WITH HYPOXEMIA: Status: ACTIVE | Noted: 2024-10-25

## 2024-10-25 LAB
ANION GAP SERPL CALC-SCNC: 6 MMOL/L (ref 3–18)
BACTERIA SPEC CULT: NORMAL
BACTERIA SPEC CULT: NORMAL
BASOPHILS # BLD: 0.1 K/UL (ref 0–0.1)
BASOPHILS NFR BLD: 0 % (ref 0–2)
BUN SERPL-MCNC: 14 MG/DL (ref 7–18)
BUN/CREAT SERPL: 19 (ref 12–20)
CALCIUM SERPL-MCNC: 9.3 MG/DL (ref 8.5–10.1)
CHLORIDE SERPL-SCNC: 108 MMOL/L (ref 100–111)
CO2 SERPL-SCNC: 25 MMOL/L (ref 21–32)
CREAT SERPL-MCNC: 0.72 MG/DL (ref 0.6–1.3)
DIFFERENTIAL METHOD BLD: ABNORMAL
EOSINOPHIL # BLD: 0.6 K/UL (ref 0–0.4)
EOSINOPHIL NFR BLD: 4 % (ref 0–5)
ERYTHROCYTE [DISTWIDTH] IN BLOOD BY AUTOMATED COUNT: 12.8 % (ref 11.6–14.5)
GLUCOSE SERPL-MCNC: 85 MG/DL (ref 74–99)
HCT VFR BLD AUTO: 35.1 % (ref 36–48)
HGB BLD-MCNC: 11 G/DL (ref 13–16)
IMM GRANULOCYTES # BLD AUTO: 0.1 K/UL (ref 0–0.04)
IMM GRANULOCYTES NFR BLD AUTO: 1 % (ref 0–0.5)
LYMPHOCYTES # BLD: 2 K/UL (ref 0.9–3.6)
LYMPHOCYTES NFR BLD: 13 % (ref 21–52)
MCH RBC QN AUTO: 29.2 PG (ref 24–34)
MCHC RBC AUTO-ENTMCNC: 31.3 G/DL (ref 31–37)
MCV RBC AUTO: 93.1 FL (ref 78–100)
MONOCYTES # BLD: 1.3 K/UL (ref 0.05–1.2)
MONOCYTES NFR BLD: 9 % (ref 3–10)
NEUTS SEG # BLD: 10.5 K/UL (ref 1.8–8)
NEUTS SEG NFR BLD: 72 % (ref 40–73)
NRBC # BLD: 0 K/UL (ref 0–0.01)
NRBC BLD-RTO: 0 PER 100 WBC
PLATELET # BLD AUTO: 620 K/UL (ref 135–420)
PMV BLD AUTO: 9.5 FL (ref 9.2–11.8)
POTASSIUM SERPL-SCNC: 4 MMOL/L (ref 3.5–5.5)
RBC # BLD AUTO: 3.77 M/UL (ref 4.35–5.65)
SERVICE CMNT-IMP: NORMAL
SODIUM SERPL-SCNC: 139 MMOL/L (ref 136–145)
WBC # BLD AUTO: 14.5 K/UL (ref 4.6–13.2)

## 2024-10-25 PROCEDURE — 97166 OT EVAL MOD COMPLEX 45 MIN: CPT

## 2024-10-25 PROCEDURE — 97116 GAIT TRAINING THERAPY: CPT

## 2024-10-25 PROCEDURE — 99232 SBSQ HOSP IP/OBS MODERATE 35: CPT | Performed by: INTERNAL MEDICINE

## 2024-10-25 PROCEDURE — 94640 AIRWAY INHALATION TREATMENT: CPT

## 2024-10-25 PROCEDURE — 6360000002 HC RX W HCPCS: Performed by: PHYSICIAN ASSISTANT

## 2024-10-25 PROCEDURE — 2580000003 HC RX 258: Performed by: PHYSICIAN ASSISTANT

## 2024-10-25 PROCEDURE — 71045 X-RAY EXAM CHEST 1 VIEW: CPT

## 2024-10-25 PROCEDURE — 97162 PT EVAL MOD COMPLEX 30 MIN: CPT

## 2024-10-25 PROCEDURE — 97535 SELF CARE MNGMENT TRAINING: CPT

## 2024-10-25 PROCEDURE — 6370000000 HC RX 637 (ALT 250 FOR IP): Performed by: PHYSICIAN ASSISTANT

## 2024-10-25 PROCEDURE — 94669 MECHANICAL CHEST WALL OSCILL: CPT

## 2024-10-25 PROCEDURE — 36415 COLL VENOUS BLD VENIPUNCTURE: CPT

## 2024-10-25 PROCEDURE — 32551 INSERTION OF CHEST TUBE: CPT

## 2024-10-25 PROCEDURE — 2700000000 HC OXYGEN THERAPY PER DAY

## 2024-10-25 PROCEDURE — 94761 N-INVAS EAR/PLS OXIMETRY MLT: CPT

## 2024-10-25 PROCEDURE — 99232 SBSQ HOSP IP/OBS MODERATE 35: CPT | Performed by: STUDENT IN AN ORGANIZED HEALTH CARE EDUCATION/TRAINING PROGRAM

## 2024-10-25 PROCEDURE — 94667 MNPJ CHEST WALL 1ST: CPT

## 2024-10-25 PROCEDURE — 6360000002 HC RX W HCPCS: Performed by: HEALTH CARE PROVIDER

## 2024-10-25 PROCEDURE — 1100000003 HC PRIVATE W/ TELEMETRY

## 2024-10-25 PROCEDURE — 6370000000 HC RX 637 (ALT 250 FOR IP): Performed by: STUDENT IN AN ORGANIZED HEALTH CARE EDUCATION/TRAINING PROGRAM

## 2024-10-25 PROCEDURE — 85025 COMPLETE CBC W/AUTO DIFF WBC: CPT

## 2024-10-25 PROCEDURE — 80048 BASIC METABOLIC PNL TOTAL CA: CPT

## 2024-10-25 RX ORDER — MORPHINE SULFATE 4 MG/ML
INJECTION, SOLUTION INTRAMUSCULAR; INTRAVENOUS
Status: DISPENSED
Start: 2024-10-25 | End: 2024-10-25

## 2024-10-25 RX ORDER — MORPHINE SULFATE 2 MG/ML
2 INJECTION, SOLUTION INTRAMUSCULAR; INTRAVENOUS
Status: DISCONTINUED | OUTPATIENT
Start: 2024-10-25 | End: 2024-11-01

## 2024-10-25 RX ORDER — MORPHINE SULFATE 4 MG/ML
4 INJECTION, SOLUTION INTRAMUSCULAR; INTRAVENOUS
Status: DISCONTINUED | OUTPATIENT
Start: 2024-10-25 | End: 2024-11-01

## 2024-10-25 RX ADMIN — BUDESONIDE 1 MG: 1 SUSPENSION RESPIRATORY (INHALATION) at 08:08

## 2024-10-25 RX ADMIN — MORPHINE SULFATE 4 MG: 4 INJECTION, SOLUTION INTRAMUSCULAR; INTRAVENOUS at 03:54

## 2024-10-25 RX ADMIN — IPRATROPIUM BROMIDE 0.5 MG: 0.5 SOLUTION RESPIRATORY (INHALATION) at 16:17

## 2024-10-25 RX ADMIN — SODIUM CHLORIDE, PRESERVATIVE FREE 10 ML: 5 INJECTION INTRAVENOUS at 09:35

## 2024-10-25 RX ADMIN — BUDESONIDE 1 MG: 1 SUSPENSION RESPIRATORY (INHALATION) at 20:28

## 2024-10-25 RX ADMIN — ARFORMOTEROL TARTRATE 15 MCG: 15 SOLUTION RESPIRATORY (INHALATION) at 20:28

## 2024-10-25 RX ADMIN — SODIUM CHLORIDE, PRESERVATIVE FREE 10 ML: 5 INJECTION INTRAVENOUS at 21:55

## 2024-10-25 RX ADMIN — IPRATROPIUM BROMIDE 0.5 MG: 0.5 SOLUTION RESPIRATORY (INHALATION) at 20:28

## 2024-10-25 RX ADMIN — PIPERACILLIN AND TAZOBACTAM 3375 MG: 3; .375 INJECTION, POWDER, LYOPHILIZED, FOR SOLUTION INTRAVENOUS at 14:24

## 2024-10-25 RX ADMIN — THERA TABS 1 TABLET: TAB at 09:35

## 2024-10-25 RX ADMIN — IPRATROPIUM BROMIDE 0.5 MG: 0.5 SOLUTION RESPIRATORY (INHALATION) at 11:22

## 2024-10-25 RX ADMIN — ARFORMOTEROL TARTRATE 15 MCG: 15 SOLUTION RESPIRATORY (INHALATION) at 08:08

## 2024-10-25 RX ADMIN — PIPERACILLIN AND TAZOBACTAM 3375 MG: 3; .375 INJECTION, POWDER, LYOPHILIZED, FOR SOLUTION INTRAVENOUS at 21:52

## 2024-10-25 RX ADMIN — IPRATROPIUM BROMIDE 0.5 MG: 0.5 SOLUTION RESPIRATORY (INHALATION) at 08:08

## 2024-10-25 RX ADMIN — ATORVASTATIN CALCIUM 10 MG: 10 TABLET, FILM COATED ORAL at 21:51

## 2024-10-25 RX ADMIN — PIPERACILLIN AND TAZOBACTAM 3375 MG: 3; .375 INJECTION, POWDER, LYOPHILIZED, FOR SOLUTION INTRAVENOUS at 06:11

## 2024-10-25 RX ADMIN — MORPHINE SULFATE 4 MG: 4 INJECTION, SOLUTION INTRAMUSCULAR; INTRAVENOUS at 14:20

## 2024-10-25 ASSESSMENT — PAIN - FUNCTIONAL ASSESSMENT
PAIN_FUNCTIONAL_ASSESSMENT: PREVENTS OR INTERFERES SOME ACTIVE ACTIVITIES AND ADLS
PAIN_FUNCTIONAL_ASSESSMENT: PREVENTS OR INTERFERES SOME ACTIVE ACTIVITIES AND ADLS

## 2024-10-25 ASSESSMENT — PAIN DESCRIPTION - ONSET: ONSET: PROGRESSIVE

## 2024-10-25 ASSESSMENT — PAIN SCALES - GENERAL
PAINLEVEL_OUTOF10: 0
PAINLEVEL_OUTOF10: 0
PAINLEVEL_OUTOF10: 6
PAINLEVEL_OUTOF10: 10
PAINLEVEL_OUTOF10: 0

## 2024-10-25 ASSESSMENT — PAIN DESCRIPTION - LOCATION
LOCATION: FLANK
LOCATION: CHEST

## 2024-10-25 ASSESSMENT — PAIN DESCRIPTION - ORIENTATION
ORIENTATION: LEFT
ORIENTATION: LEFT

## 2024-10-25 ASSESSMENT — PAIN DESCRIPTION - DESCRIPTORS
DESCRIPTORS: ACHING
DESCRIPTORS: SHARP

## 2024-10-25 ASSESSMENT — PAIN DESCRIPTION - PAIN TYPE: TYPE: ACUTE PAIN

## 2024-10-25 ASSESSMENT — PAIN DESCRIPTION - FREQUENCY: FREQUENCY: INTERMITTENT

## 2024-10-25 NOTE — PLAN OF CARE
Problem: Discharge Planning  Goal: Discharge to home or other facility with appropriate resources  Outcome: Progressing     Problem: Pain  Goal: Verbalizes/displays adequate comfort level or baseline comfort level  Outcome: Progressing     Problem: Safety - Adult  Goal: Free from fall injury  Outcome: Progressing     Problem: Skin/Tissue Integrity  Goal: Absence of new skin breakdown  Description: 1.  Monitor for areas of redness and/or skin breakdown  2.  Assess vascular access sites hourly  3.  Every 4-6 hours minimum:  Change oxygen saturation probe site  4.  Every 4-6 hours:  If on nasal continuous positive airway pressure, respiratory therapy assess nares and determine need for appliance change or resting period.  Outcome: Progressing     Problem: Respiratory - Adult  Goal: Achieves optimal ventilation and oxygenation  Outcome: Progressing     Problem: Cardiovascular - Adult  Goal: Maintains optimal cardiac output and hemodynamic stability  Outcome: Progressing  Goal: Absence of cardiac dysrhythmias or at baseline  Outcome: Progressing     Problem: Skin/Tissue Integrity - Adult  Goal: Skin integrity remains intact  Outcome: Progressing  Goal: Incisions, wounds, or drain sites healing without S/S of infection  Outcome: Progressing  Goal: Oral mucous membranes remain intact  Outcome: Progressing     Problem: Musculoskeletal - Adult  Goal: Return mobility to safest level of function  Outcome: Progressing  Goal: Maintain proper alignment of affected body part  Outcome: Progressing  Goal: Return ADL status to a safe level of function  Outcome: Progressing     Problem: Gastrointestinal - Adult  Goal: Maintains or returns to baseline bowel function  Outcome: Progressing  Goal: Maintains adequate nutritional intake  Outcome: Progressing     Problem: Infection - Adult  Goal: Absence of infection at discharge  Outcome: Progressing  Goal: Absence of infection during hospitalization  Outcome: Progressing  Goal: Absence

## 2024-10-25 NOTE — PROGRESS NOTES
1701 Bedside shift report given to Hayley Maria RN from Ahsan RAMIREZ. Report including the following information SBAR, Kardex, recent results, MAR, intake/output and cardiac rhythm ST.     1900 Bedside shift report given to Abbie RAMIREZ by Hayley RAMIREZ, all question and concerns answered.

## 2024-10-25 NOTE — PROGRESS NOTES
[facility]ist [facility]  Progress Note    Patient: Delfin Shetty Age: 68 y.o. : 1955 MR#: [id number] [id number]: [id number]  Date/Time: 10/25/2024    Subjective:   Subjective   No acute events overnight, no new concerns or complaints, vitals stable.      Review of Systems   Constitutional: Negative for fever.      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  Chest tube in good place, not bothering patient overly much.  Pulmonology on board to assist with multiple pulmonary issues including chest tube, appreciate their expertise as always  Infectious disease on board given initial presentation and concern for sepsis as a possible cause.  Continue Zosyn for time being given possibility of postobstructive pneumonia.    Follow-up pleural cultures/biopsies and fluid studies    Wean oxygen as tolerated    Disposition: Expected location: Home     Expected discharge date: 10/28/2024      Case discussed with:  [x]Patient  []Family  [x]Nursing  [x]Case Management  DVT Prophylaxis:  [x]Lovenox  []Hep SQ  [x]SCDs  []Coumadin   []On Heparin gtt   [] DOAC    Objective:   Objective:  General Appearance:  In no acute distress, not in pain, uncomfortable and ill-appearing.    Vital signs: (most recent): Blood pressure 115/63, pulse (!) 111, temperature 98 °F (36.7 °C), temperature source Oral, resp. rate 30, height 1.702 m (5' 7\"), weight 65.6 kg (144 lb 10 oz), SpO2 97%.  No fever.    HEENT: Normal HEENT exam.    Lungs:  Normal respiratory rate and increased effort.  There are decreased breath sounds and rhonchi.

## 2024-10-25 NOTE — PROGRESS NOTES
4 Eyes Skin Assessment     NAME:  Delfin Shetty  YOB: 1955  MEDICAL RECORD NUMBER:  [id number]    The patient is being assessed for  Shift Handoff    I agree that at least one RN has performed a thorough Head to Toe Skin Assessment on the patient. ALL assessment sites listed below have been assessed.      Areas assessed by both nurses:    Head, Face, Ears, Shoulders, Back, Chest, Arms, Elbows, Hands, Sacrum. Buttock, Coccyx, Ischium, Legs. Feet and Heels, and Under Medical Devices         Does the Patient have a Wound? No noted wound(s)       Valentin Prevention initiated by RN: No  Wound Care Orders initiated by RN: No    Pressure Injury (Stage 3,4, Unstageable, DTI, NWPT, and Complex wounds) if present, place Wound referral order by RN under : No    New Ostomies, if present place, Ostomy referral order under : No     Nurse 1 eSignature: Electronically signed by Hayley Maria RN on 10/25/24 at 7:42 PM EDT    **SHARE this note so that the co-signing nurse can place an eSignature**    Nurse 2 eSignature: {Esignature:[id number]}

## 2024-10-25 NOTE — PLAN OF CARE
Problem: Discharge Planning  Goal: Discharge to home or other facility with appropriate resources  Outcome: Progressing  Flowsheets (Taken 10/23/2024 2115 by Sarah De Los Santos, RN)  Discharge to home or other facility with appropriate resources:   Identify barriers to discharge with patient and caregiver   Identify discharge learning needs (meds, wound care, etc)     Problem: Pain  Goal: Verbalizes/displays adequate comfort level or baseline comfort level  Outcome: Progressing  Flowsheets (Taken 10/25/2024 1553 by Caroline Lorenzo, RN)  Verbalizes/displays adequate comfort level or baseline comfort level:   Encourage patient to monitor pain and request assistance   Assess pain using appropriate pain scale     Problem: Safety - Adult  Goal: Free from fall injury  Outcome: Progressing  Flowsheets (Taken 10/25/2024 1751)  Free From Fall Injury:   Instruct family/caregiver on patient safety   Based on caregiver fall risk screen, instruct family/caregiver to ask for assistance with transferring infant if caregiver noted to have fall risk factors     Problem: Skin/Tissue Integrity  Goal: Absence of new skin breakdown  Description: 1.  Monitor for areas of redness and/or skin breakdown  2.  Assess vascular access sites hourly  3.  Every 4-6 hours minimum:  Change oxygen saturation probe site  4.  Every 4-6 hours:  If on nasal continuous positive airway pressure, respiratory therapy assess nares and determine need for appliance change or resting period.  Outcome: Progressing     Problem: Respiratory - Adult  Goal: Achieves optimal ventilation and oxygenation  Outcome: Progressing     Problem: Cardiovascular - Adult  Goal: Maintains optimal cardiac output and hemodynamic stability  Outcome: Progressing  Goal: Absence of cardiac dysrhythmias or at baseline  Outcome: Progressing     Problem: Skin/Tissue Integrity - Adult  Goal: Skin integrity remains intact  Outcome: Progressing  Flowsheets (Taken 10/25/2024 0800 by Christy  with modified independence for 150 feet with the least restrictive device.   5.  Patient will ascend/descend 2 stairs with b/l handrail(s) with modified independence.    PLOF: Pt lives alone in a single story home, 2 KIRILL with handrails. Pt was independent with all mobility, no AD.    10/25/2024 1247 by Wendi Salinas PT  Outcome: Progressing     Problem: Occupational Therapy - Adult  Goal: By Discharge: Performs self-care activities at highest level of function for planned discharge setting.  See evaluation for individualized goals.  Description: Occupational Therapy Goals:  Initiated 10/25/2024 to be met within 7-10 days.    1.  Patient will perform grooming with supervision/set-up standing at sink with good balance.   2.  Patient will perform bathing with supervision/set-up.  3.  Patient will perform lower body dressing  with supervision/set-up.  4.  Patient will perform toilet transfers with supervision/set-up  5.  Patient will perform all aspects of toileting with supervision/set-up.  6.  Patient will participate in upper extremity therapeutic exercise/activities with supervision/set-up for 8-10 minutes to increase strength/endurance for ADLs.    7.  Patient will utilize energy conservation techniques during functional activities with verbal cues.    PLOF: Pt lives alone in shed, 2 steps to enter, tub only, previously independent with ADLs.      10/25/2024 1353 by Cathie Bhatt  Outcome: Progressing

## 2024-10-25 NOTE — PLAN OF CARE
Intact  Standing: Impaired;With support  Standing - Static: Good  Standing - Dynamic: Fair    Strength: BUE  Strength: Generally decreased, functional (L shoulder strength not assessed due to pain from chest tube)    Tone & Sensation: BUE  Tone: Normal  Sensation: Intact    Range of Motion: BUE  AROM: Generally decreased, functional (L Shoulder AROM decreased due to pain from chest tube)         Functional Mobility and Transfers for ADLs:  Bed Mobility:     Bed Mobility Training  Bed Mobility Training: Yes  Supine to Sit: Minimum assistance  Scooting: Stand-by assistance  Transfers:   Transfer Training  Transfer Training: Yes  Sit to Stand: Stand-by assistance  Stand to Sit: Stand-by assistance    ADL Assessment:   Feeding: Modified independent   Grooming: Minimal assistance  UE Bathing: Minimal assistance  LE Bathing: Minimal assistance  UE Dressing: Minimal assistance  LE Dressing: Minimal assistance  Toileting: Minimal assistance  Functional Mobility: Minimal assistance          ADL Intervention:  Min A supine > sit   SBA sit <> stand   Min A UE bathing   Min A grooming   Min A LE dressing   Min A functional mobility        Pain:  Intensity Pre-treatment: 7/10   Intensity Post-treatment: 7/10  Scale: Numeric Rating Scale  Location: Left Chest  Quality: Aching  Intervention(s): Rest      Activity Tolerance:   Activity Tolerance: Patient Tolerated treatment well  Please refer to the flowsheet for vital signs taken during this treatment.    After treatment:   [x] Patient left in no apparent distress sitting up in chair  [] Patient left in no apparent distress in bed  [x] Call bell left within reach  [x] Nursing notified  [] Caregiver present  [] Bed alarm activated    COMMUNICATION/EDUCATION:   Patient Education  Education Given To: Patient  Education Provided: Role of Therapy;Plan of Care;ADL Adaptive Strategies;Transfer Training;Mobility Training;Fall Prevention Strategies  Education Method: Verbal;Teach

## 2024-10-25 NOTE — PROGRESS NOTES
Infectious Disease Progress Note        Reason: Evaluate for severe sepsis/empyema    Current abx Prior abx   Pip/tazo since 10/23/24 Vancomycin 10/23-10/24     Lines:       Assessment :   68 y.o. male with a PMHx of tobacco abuse, HTN, HLD who presented to the ED on 10/23/24 with c/o SOB associated with cough and chest pain ongoing for the past 2 weeks.    I agree that initial clinical presentation is concerning for sepsis/empyema.  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    Status post thoracentesis, chest tube insertion on 10/24/2024.  No neutrophils seen on pleural fluid argues against empyema.  Pleural fluid cultures 10/24-no organisms    Recommendations:    Continue with piperacillin/tazobactam.  Follow-up pleural fluid cultures.  Modify antibiotics accordingly  Management of chest tube per pulmonary/IR  Follow-up results of pleural biopsy  Further recommendations based on above test results, clinical course    Above plan was discussed in details with patient, primary team. Please call me if any further questions or concerns. Will continue to participate in the care of this patient.    HPI:     Feels better.  Improved left chest discomfort. he denies fever, chills, abd pain, nvd, leg swelling.    Past Medical History:   Diagnosis Date    HLD (hyperlipidemia) 10/23/2024    Primary hypertension 10/23/2024       Past Surgical History:   Procedure Laterality Date    CT BIOPSY SOFT TISSUE NECK  10/24/2024    CT BIOPSY LUNG/MEDIASTINUM PERC 10/24/2024 Oc Gramajo MD [facility] RAD CT       [unfilled]    Current Facility-Administered Medications   Medication Dose Route Frequency    morphine (PF) injection 2 mg  2 mg IntraVENous Q2H PRN    Or    morphine sulfate (PF) injection 4 mg  4 mg IntraVENous Q2H PRN

## 2024-10-25 NOTE — PROGRESS NOTES
Physician Progress Note      PATIENT:               MIGUEL GOODMAN  CSN #:                  [id number]  :                       1955  ADMIT DATE:       10/23/2024 1:03 PM  DISCH DATE:  RESPONDING  PROVIDER #:        Júnior Colon MD          QUERY TEXT:    Pt admitted with Severe Sepsis.  Noted documentation of SIRS due to malignant   pleural affusion on 10/24/24 by ordered Internal Medicine consultant.  If   possible, please document in progress notes and discharge summary:    The medical record reflects the following:    Risk Factors:  67 yo male admitted with severe sepsis    Clinical Indicators:  10/23 H&P:  Severe sepsis: RR >30, HR >120, WBC 18.8. Lactic elevated initially. Procal   0.21. Due to pneumonia/emypema    10/24 Int med: \"I agree that initial clinical presentation is concerning for   sepsis/empyema.  However after obtaining detailed history, review of available   lab/imaging studies; Clinical presentation seems most consistent with SIRS   due to malignant pleural effusion, probable post obstructive pneumonia\"    10/23 CXR:  Large left pleural effusion with opacification of the majority of   the left hemithorax that may represent atelectasis, however, pneumonia is   difficult to exclude.    10/23 CTA chest: There is a huge relatively loculated subpulmonic pleural   effusion in the lower chest depressing an inverting the left hemidiaphragm and   displace the abdominal contents. There is complete atelectasis of the left   lower lobe and much of the lingula. There is a combination of atelectasis and   peripheral consolidation involving the left upper lobe with additional   peripheral somewhat loculated fluid. There is a somewhat masslike   consolidation in the lateral left apex which is somewhat for possible mass and   should be monitored on follow-up.    Treatment:  blood cultures, vanco, zosyn, ct guided chest tube, pul consult, labs, pleural   fluid cultures, CTA abd, tele

## 2024-10-25 NOTE — PROGRESS NOTES
[facility]  Pulmonary, Critical Care, and Sleep Medicine    Pulmonary Medicine Progress Note    Name: Delfin Shetty MRN: [id number]  : 1955 Hospital: [facility]  Date: 10/25/2024       Subjective:  Pt overall stable. Remains on NC, good sats. No complaints. Slight cough, non productive.     Patient Active Problem List   Diagnosis    Severe sepsis (HCC)    Primary hypertension    HLD (hyperlipidemia)    Acute respiratory failure with hypoxia    Hypokalemia    Eosinophilia    Thrombocytosis    Bullous emphysema (HCC)    Empyema lung (HCC)    Large pleural effusion    Pneumonia of left lung due to infectious organism       Assessment/PLAN:    IMPRESSION:   Large L pleural effusion with loculations and tension contralateral mediastinal shift. Concern for mesothelioma given appearance of pleura or metastatic lung cancer given mass like consolidation L apex. Cannot R/o pneumonia ?post obstructive with leukocytosis and resulting parapneumonic effusion  - s/p chest tube 10/24- exudative fluid- lymphocytic and eosinophilic. Not likely infected  Flu and COVI PCR negative  Acute hypoxic respiratory failure due to combination of passive atelectasis, consolidation, COPD  COPD with bullous emphysema on imaging, not in exacerbation  Unintended weight loss concerning for malignancy  Former smoker  Hypertension   Dyslipidemia        RECOMMENDATIONS:   Chest tube to suction to remove up to 2L a day, clamped after that. Order placed, discussed with nursing.   Daily CXR  Eosinophilic exudate noted, malignancy remains the most likely- less likely parasitic infection or autoimmune.   Awaiting cytology  Titrate supplemental O2 to saturation greater than 90%  Assess home O2 needs prior to discharge  Bronchodilators prn  Baseline PFT as outpatient  Encourage IS and bronchial hygiene  Empiric antibiotics Zosyn/Vanco and streamline to cultures  No further steroids needed  Prophylaxis issues per

## 2024-10-25 NOTE — PLAN OF CARE
Problem: Physical Therapy - Adult  Goal: By Discharge: Performs mobility at highest level of function for planned discharge setting.  See evaluation for individualized goals.  Description: Initiated  10/25/24  to be met within 7-10 days.    1.  Patient will move from supine to sit and sit to supine , scoot up and down, and roll side to side in bed with modified independence.    2.  Patient will transfer from bed to chair and chair to bed with modified independence using the least restrictive device.  3.  Patient will perform sit to stand with modified independence.  4.  Patient will ambulate with modified independence for 150 feet with the least restrictive device.   5.  Patient will ascend/descend 2 stairs with b/l handrail(s) with modified independence.    PLOF: Pt lives alone in a single story home, 2 [address] with handrails. Pt was independent with all mobility, no AD.    Outcome: Progressing     PHYSICAL THERAPY EVALUATION    Patient: Delfin Shetty (68 y.o. male)  Date: 10/25/2024  Primary Diagnosis: Hypokalemia [E87.6]  Empyema lung (HCC) [J86.9]  Bullous emphysema (HCC) [J43.9]  Acute respiratory failure with hypoxemia [J96.01]  Sepsis due to pneumonia (HCC) [J18.9, A41.9]  Pneumonia of left lung due to infectious organism, unspecified part of lung [J18.9]  Large pleural effusion [J90]       Precautions: Fall Risk, General Precautions,  ,  ,  ,  ,  ,  ,      ASSESSMENT :  Pt resting in bed upon entering room, agreeable to PT evaluation.  Pt with L chest tube and on 3.5 L O2 through NC.  Pt was able to perform supine to sit transfer with SBA, good sitting balance.  STS with RW and SBA and ambulated throughout room with CGA and assistance for line management.  Pt agreeable to sit up in recliner post treatment.  Pt with fluctuating O2 sats throughout treatment session, dropping to 83% after ambulation requiring verbal cues for PLB and recovered to mid 90s in ~ 1min.  Pt HR also fluctuating with highest value

## 2024-10-25 NOTE — DOWNTIME EVENT NOTE
Morphine 4mg administered for pain @ 0354. Unable to link medication in MAR. [place] override due to system downtime.

## 2024-10-26 ENCOUNTER — APPOINTMENT (OUTPATIENT)
Facility: HOSPITAL | Age: 69
End: 2024-10-26
Payer: MEDICARE

## 2024-10-26 PROBLEM — J18.9 SEPSIS DUE TO PNEUMONIA (HCC): Status: ACTIVE | Noted: 2024-10-26

## 2024-10-26 PROBLEM — A41.9 SEPSIS DUE TO PNEUMONIA (HCC): Status: ACTIVE | Noted: 2024-10-26

## 2024-10-26 LAB
ACID FAST STN SPEC: NEGATIVE
ANION GAP SERPL CALC-SCNC: 8 MMOL/L (ref 3–18)
BASOPHILS # BLD: 0 K/UL (ref 0–0.1)
BASOPHILS NFR BLD: 0 % (ref 0–2)
BUN SERPL-MCNC: 12 MG/DL (ref 7–18)
BUN/CREAT SERPL: 19 (ref 12–20)
CALCIUM SERPL-MCNC: 8.3 MG/DL (ref 8.5–10.1)
CHLORIDE SERPL-SCNC: 107 MMOL/L (ref 100–111)
CO2 SERPL-SCNC: 24 MMOL/L (ref 21–32)
CREAT SERPL-MCNC: 0.62 MG/DL (ref 0.6–1.3)
DIFFERENTIAL METHOD BLD: ABNORMAL
EOSINOPHIL # BLD: 1.8 K/UL (ref 0–0.4)
EOSINOPHIL NFR BLD: 13 % (ref 0–5)
ERYTHROCYTE [DISTWIDTH] IN BLOOD BY AUTOMATED COUNT: 12.7 % (ref 11.6–14.5)
GLUCOSE SERPL-MCNC: 93 MG/DL (ref 74–99)
HCT VFR BLD AUTO: 31.4 % (ref 36–48)
HGB BLD-MCNC: 9.9 G/DL (ref 13–16)
IMM GRANULOCYTES # BLD AUTO: 0 K/UL (ref 0–0.04)
IMM GRANULOCYTES NFR BLD AUTO: 0 % (ref 0–0.5)
LYMPHOCYTES # BLD: 1.8 K/UL (ref 0.9–3.6)
LYMPHOCYTES NFR BLD: 13 % (ref 21–52)
MCH RBC QN AUTO: 29.2 PG (ref 24–34)
MCHC RBC AUTO-ENTMCNC: 31.5 G/DL (ref 31–37)
MCV RBC AUTO: 92.6 FL (ref 78–100)
MONOCYTES # BLD: 1.2 K/UL (ref 0.05–1.2)
MONOCYTES NFR BLD: 9 % (ref 3–10)
MYCOBACTERIUM SPEC QL CULT: NORMAL
NEUTS SEG # BLD: 8.8 K/UL (ref 1.8–8)
NEUTS SEG NFR BLD: 65 % (ref 40–73)
NRBC # BLD: 0.02 K/UL (ref 0–0.01)
NRBC BLD-RTO: 0.1 PER 100 WBC
PLATELET # BLD AUTO: 535 K/UL (ref 135–420)
PLATELET COMMENT: ABNORMAL
PMV BLD AUTO: 9.3 FL (ref 9.2–11.8)
POTASSIUM SERPL-SCNC: 4.2 MMOL/L (ref 3.5–5.5)
RBC # BLD AUTO: 3.39 M/UL (ref 4.35–5.65)
RBC MORPH BLD: ABNORMAL
SODIUM SERPL-SCNC: 139 MMOL/L (ref 136–145)
SPECIMEN PREPARATION: NORMAL
SPECIMEN SOURCE: NORMAL
WBC # BLD AUTO: 13.6 K/UL (ref 4.6–13.2)

## 2024-10-26 PROCEDURE — 6370000000 HC RX 637 (ALT 250 FOR IP): Performed by: STUDENT IN AN ORGANIZED HEALTH CARE EDUCATION/TRAINING PROGRAM

## 2024-10-26 PROCEDURE — 6370000000 HC RX 637 (ALT 250 FOR IP): Performed by: PHYSICIAN ASSISTANT

## 2024-10-26 PROCEDURE — 6360000002 HC RX W HCPCS: Performed by: HEALTH CARE PROVIDER

## 2024-10-26 PROCEDURE — 99232 SBSQ HOSP IP/OBS MODERATE 35: CPT | Performed by: STUDENT IN AN ORGANIZED HEALTH CARE EDUCATION/TRAINING PROGRAM

## 2024-10-26 PROCEDURE — 85025 COMPLETE CBC W/AUTO DIFF WBC: CPT

## 2024-10-26 PROCEDURE — 0W9B3ZZ DRAINAGE OF LEFT PLEURAL CAVITY, PERCUTANEOUS APPROACH: ICD-10-PCS | Performed by: STUDENT IN AN ORGANIZED HEALTH CARE EDUCATION/TRAINING PROGRAM

## 2024-10-26 PROCEDURE — 6360000002 HC RX W HCPCS: Performed by: PHYSICIAN ASSISTANT

## 2024-10-26 PROCEDURE — 2580000003 HC RX 258: Performed by: INTERNAL MEDICINE

## 2024-10-26 PROCEDURE — 2580000003 HC RX 258: Performed by: PHYSICIAN ASSISTANT

## 2024-10-26 PROCEDURE — 94761 N-INVAS EAR/PLS OXIMETRY MLT: CPT

## 2024-10-26 PROCEDURE — 2700000000 HC OXYGEN THERAPY PER DAY

## 2024-10-26 PROCEDURE — 94669 MECHANICAL CHEST WALL OSCILL: CPT

## 2024-10-26 PROCEDURE — 6360000002 HC RX W HCPCS: Performed by: INTERNAL MEDICINE

## 2024-10-26 PROCEDURE — 1100000003 HC PRIVATE W/ TELEMETRY

## 2024-10-26 PROCEDURE — 32561 LYSE CHEST FIBRIN INIT DAY: CPT

## 2024-10-26 PROCEDURE — 80048 BASIC METABOLIC PNL TOTAL CA: CPT

## 2024-10-26 PROCEDURE — 36415 COLL VENOUS BLD VENIPUNCTURE: CPT

## 2024-10-26 PROCEDURE — 71045 X-RAY EXAM CHEST 1 VIEW: CPT

## 2024-10-26 PROCEDURE — 94640 AIRWAY INHALATION TREATMENT: CPT

## 2024-10-26 PROCEDURE — 99232 SBSQ HOSP IP/OBS MODERATE 35: CPT | Performed by: INTERNAL MEDICINE

## 2024-10-26 RX ADMIN — PIPERACILLIN AND TAZOBACTAM 3375 MG: 3; .375 INJECTION, POWDER, LYOPHILIZED, FOR SOLUTION INTRAVENOUS at 13:23

## 2024-10-26 RX ADMIN — IPRATROPIUM BROMIDE 0.5 MG: 0.5 SOLUTION RESPIRATORY (INHALATION) at 16:03

## 2024-10-26 RX ADMIN — PIPERACILLIN AND TAZOBACTAM 3375 MG: 3; .375 INJECTION, POWDER, LYOPHILIZED, FOR SOLUTION INTRAVENOUS at 05:57

## 2024-10-26 RX ADMIN — MORPHINE SULFATE 2 MG: 2 INJECTION, SOLUTION INTRAMUSCULAR; INTRAVENOUS at 17:55

## 2024-10-26 RX ADMIN — ALTEPLASE 10 MG: 2.2 INJECTION, POWDER, LYOPHILIZED, FOR SOLUTION INTRAVENOUS at 13:43

## 2024-10-26 RX ADMIN — PIPERACILLIN AND TAZOBACTAM 3375 MG: 3; .375 INJECTION, POWDER, LYOPHILIZED, FOR SOLUTION INTRAVENOUS at 21:45

## 2024-10-26 RX ADMIN — SODIUM CHLORIDE, PRESERVATIVE FREE 10 ML: 5 INJECTION INTRAVENOUS at 08:42

## 2024-10-26 RX ADMIN — THERA TABS 1 TABLET: TAB at 08:42

## 2024-10-26 RX ADMIN — IPRATROPIUM BROMIDE 0.5 MG: 0.5 SOLUTION RESPIRATORY (INHALATION) at 20:08

## 2024-10-26 RX ADMIN — ARFORMOTEROL TARTRATE 15 MCG: 15 SOLUTION RESPIRATORY (INHALATION) at 07:59

## 2024-10-26 RX ADMIN — ARFORMOTEROL TARTRATE 15 MCG: 15 SOLUTION RESPIRATORY (INHALATION) at 20:08

## 2024-10-26 RX ADMIN — IPRATROPIUM BROMIDE 0.5 MG: 0.5 SOLUTION RESPIRATORY (INHALATION) at 11:51

## 2024-10-26 RX ADMIN — BUDESONIDE 1 MG: 1 SUSPENSION RESPIRATORY (INHALATION) at 07:59

## 2024-10-26 RX ADMIN — MORPHINE SULFATE 4 MG: 4 INJECTION, SOLUTION INTRAMUSCULAR; INTRAVENOUS at 02:43

## 2024-10-26 RX ADMIN — WATER 5 MG: 1 INJECTION INTRAMUSCULAR; INTRAVENOUS; SUBCUTANEOUS at 13:42

## 2024-10-26 RX ADMIN — IPRATROPIUM BROMIDE 0.5 MG: 0.5 SOLUTION RESPIRATORY (INHALATION) at 07:59

## 2024-10-26 RX ADMIN — ATORVASTATIN CALCIUM 10 MG: 10 TABLET, FILM COATED ORAL at 21:24

## 2024-10-26 RX ADMIN — SODIUM CHLORIDE, PRESERVATIVE FREE 10 ML: 5 INJECTION INTRAVENOUS at 21:25

## 2024-10-26 RX ADMIN — BUDESONIDE 1 MG: 1 SUSPENSION RESPIRATORY (INHALATION) at 20:08

## 2024-10-26 ASSESSMENT — PAIN DESCRIPTION - DESCRIPTORS
DESCRIPTORS: ACHING
DESCRIPTORS: ACHING

## 2024-10-26 ASSESSMENT — PAIN SCALES - GENERAL
PAINLEVEL_OUTOF10: 0
PAINLEVEL_OUTOF10: 0
PAINLEVEL_OUTOF10: 2
PAINLEVEL_OUTOF10: 0
PAINLEVEL_OUTOF10: 6
PAINLEVEL_OUTOF10: 0
PAINLEVEL_OUTOF10: 10
PAINLEVEL_OUTOF10: 0
PAINLEVEL_OUTOF10: 0

## 2024-10-26 ASSESSMENT — PAIN DESCRIPTION - ONSET: ONSET: GRADUAL

## 2024-10-26 ASSESSMENT — PAIN - FUNCTIONAL ASSESSMENT: PAIN_FUNCTIONAL_ASSESSMENT: PREVENTS OR INTERFERES SOME ACTIVE ACTIVITIES AND ADLS

## 2024-10-26 ASSESSMENT — PAIN DESCRIPTION - ORIENTATION
ORIENTATION: LEFT
ORIENTATION: LEFT;MID;DISTAL

## 2024-10-26 ASSESSMENT — PAIN DESCRIPTION - LOCATION
LOCATION: BACK
LOCATION: CHEST

## 2024-10-26 ASSESSMENT — PAIN DESCRIPTION - PAIN TYPE: TYPE: ACUTE PAIN

## 2024-10-26 ASSESSMENT — PAIN DESCRIPTION - FREQUENCY: FREQUENCY: INTERMITTENT

## 2024-10-26 NOTE — PLAN OF CARE
Problem: Discharge Planning  Goal: Discharge to home or other facility with appropriate resources  10/26/2024 0930 by Caroline Lorenzo RN  Outcome: Progressing  Flowsheets (Taken 10/26/2024 0800)  Discharge to home or other facility with appropriate resources:   Identify barriers to discharge with patient and caregiver   Arrange for needed discharge resources and transportation as appropriate   Identify discharge learning needs (meds, wound care, etc)   Arrange for interpreters to assist at discharge as needed  10/26/2024 0220 by Abbie Amaya RN  Outcome: Progressing     Problem: Pain  Goal: Verbalizes/displays adequate comfort level or baseline comfort level  10/26/2024 0930 by Caroline Lorenzo RN  Outcome: Progressing  Flowsheets (Taken 10/26/2024 0841)  Verbalizes/displays adequate comfort level or baseline comfort level:   Encourage patient to monitor pain and request assistance   Assess pain using appropriate pain scale  10/26/2024 0220 by Abbie Amaya RN  Outcome: Progressing  Flowsheets (Taken 10/25/2024 1553 by Caroline Lorenzo RN)  Verbalizes/displays adequate comfort level or baseline comfort level:   Encourage patient to monitor pain and request assistance   Assess pain using appropriate pain scale     Problem: Safety - Adult  Goal: Free from fall injury  10/26/2024 0930 by Caroline Lorenzo RN  Outcome: Progressing  10/26/2024 0220 by Abbie Amaya RN  Outcome: Progressing  Note: Instruct patient to call before getting up for safety.      Problem: Skin/Tissue Integrity  Goal: Absence of new skin breakdown  Description: 1.  Monitor for areas of redness and/or skin breakdown  2.  Assess vascular access sites hourly  3.  Every 4-6 hours minimum:  Change oxygen saturation probe site  4.  Every 4-6 hours:  If on nasal continuous positive airway pressure, respiratory therapy assess nares and determine need for appliance change or resting period.  10/26/2024 0930 by Caroline Lorenzo RN  Outcome:  0800)  Electrolytes maintained within normal limits:   Administer electrolyte replacement as ordered   Monitor labs and assess patient for signs and symptoms of electrolyte imbalances  10/26/2024 0220 by Abbie Amaya RN  Outcome: Progressing  Goal: Hemodynamic stability and optimal renal function maintained  10/26/2024 0930 by Caroline Lorenzo RN  Outcome: Progressing  10/26/2024 0220 by Abbie Amaya RN  Outcome: Progressing  Goal: Glucose maintained within prescribed range  10/26/2024 0930 by Caroline Lorenzo RN  Outcome: Progressing  10/26/2024 0220 by Abbie Amaya RN  Outcome: Progressing     Problem: Hematologic - Adult  Goal: Maintains hematologic stability  10/26/2024 0930 by Caroline Lorenzo RN  Outcome: Progressing  Flowsheets (Taken 10/26/2024 0800)  Maintains hematologic stability:   Monitor labs for bleeding or clotting disorders   Assess for signs and symptoms of bleeding or hemorrhage   Administer blood products/factors as ordered  10/26/2024 0220 by Abbie Amaya RN  Outcome: Progressing

## 2024-10-26 NOTE — PROGRESS NOTES
[facility]  Pulmonary, Critical Care, and Sleep Medicine    Pulmonary Medicine Progress Note    Name: Delfin Shetty MRN: [id number]  : 1955 Hospital: [facility]  Date: 10/26/2024       Subjective:  Pt without complaints. Only 800cc out of chest tube, remains on suction. No cough. Shortness of breath improved.     Patient Active Problem List   Diagnosis    Severe sepsis (HCC)    Primary hypertension    HLD (hyperlipidemia)    Acute respiratory failure with hypoxia    Hypokalemia    Eosinophilia    Thrombocytosis    Bullous emphysema (HCC)    Empyema lung (HCC)    Large pleural effusion    Pneumonia of left lung due to infectious organism    Acute respiratory failure with hypoxemia       Assessment/PLAN:    IMPRESSION:   Large L pleural effusion with loculations and tension contralateral mediastinal shift. Concern for mesothelioma given appearance of pleura or metastatic lung cancer given mass like consolidation L apex. Cannot R/o pneumonia ?post obstructive with leukocytosis and resulting parapneumonic effusion  - s/p chest tube 10/24- exudative fluid- lymphocytic and eosinophilic. Not likely infected  Flu and COVI PCR negative  Acute hypoxic respiratory failure due to combination of passive atelectasis, consolidation, COPD  COPD with bullous emphysema on imaging, not in exacerbation  Unintended weight loss concerning for malignancy  Former smoker  Hypertension   Dyslipidemia        RECOMMENDATIONS:   Will start intrapleural lytics- dornase/tpase- Ct to suction otherwise.   Daily CXR  Eosinophilic exudate noted, malignancy remains the most likely- less likely parasitic infection or autoimmune.   Awaiting cytology  Titrate supplemental O2 to saturation greater than 90%  Assess home O2 needs prior to discharge  Bronchodilators prn  Baseline PFT as outpatient  Encourage IS and bronchial hygiene  Empiric antibiotics Zosyn/Vanco and streamline to cultures  No further  chest tube placement    Attending: Oc Gramajo M.D.  Assistant: None    Preoperative Diagnosis:  left pleural effusion  Postoperative Diagnosis: Same    Anesthesia: 1% lidocaine local and intravenous moderate sedation with continuous  hemodynamic monitoring and pulse oximetry was administered by a radiology nurse.  Please see nursing flow sheet for detailed dosing.    Sedation time: 60 minutes (combined sedation time for chest tube placement and  left pleural mass biopsy)    Technique: The risks, benefits, and alternatives of the procedure were discussed  with the patient. Verbal and written consent was obtained. Time-out was  performed to confirm the correct patient, procedure, and site. With the patient  RPO, the skin of the left lateral chest was prepped and draped in usual sterile  fashion. Maximum sterile barrier technique used. Local anesthesia was  administered. Under CT guidance, an 18 gauge needle was advanced into the left  pleural space. A 0.035 inch J-wire was placed through the needle and a repeat CT  was performed to verify positioning. Serial dilation was performed and a 16 Fr  Thal-Quick chest tube was inserted. Postprocedure imaging was performed,  confirming appropriate positioning of the chest tube within the left pleural  effusion. The tube was hooked up to a pleura-vac and placed to gravity drainage.    All CT scans at this facility are performed using dose optimization technique as  appropriate to a performed exam, to include automated exposure control,  adjustment of the mA and/or kV according to patient size (including appropriate  matching for site-specific examinations), or use of iterative reconstruction  technique.    CT DLP: 1191.23 mGy-centimeters    Estimated Blood Loss: Minimal    Contrast: None    Complications: No immediate      Drain/Implant: 16 Fr chest tube to gravity drainage    Condition: Stable    Disposition: Recovery unit for nurse monitoring

## 2024-10-26 NOTE — PROCEDURES
[facility]  Pulmonary, Critical Care, and Sleep Medicine  TPA/Dornase instillation via Chest Tube Procedure     Indication/ pre procedure diagnosis: empyema   Post procedure diagnosis: same     Chest tube clamped. Aseptic technique was observed. Dornase hugh 5 mg/30ml followed by Alteplase 10 mg/30ml instilled into LEFT pleural cavity via self sealing chest tube.   Instructions given to nursing to reposition pt q 15 minutes x4, and unclamp tube and place back on wall suction at 20mmhg after 1 hour at 1445.  Pt tolerated procedure well.     GREY CARLSON PA-C  10/26/24  Pulmonary, Critical Care Medicine  [facility]

## 2024-10-26 NOTE — PLAN OF CARE
Problem: Pain  Goal: Verbalizes/displays adequate comfort level or baseline comfort level  10/26/2024 0220 by Abbie Amaya RN  Outcome: Progressing  Flowsheets (Taken 10/25/2024 1553 by Caroline Lorenzo RN)  Verbalizes/displays adequate comfort level or baseline comfort level:   Encourage patient to monitor pain and request assistance   Assess pain using appropriate pain scale  10/25/2024 1751 by Hayley Marai RN  Outcome: Progressing  Flowsheets (Taken 10/25/2024 1553 by Caroline Lorenzo RN)  Verbalizes/displays adequate comfort level or baseline comfort level:   Encourage patient to monitor pain and request assistance   Assess pain using appropriate pain scale     Problem: Safety - Adult  Goal: Free from fall injury  10/26/2024 0220 by Abbie Amaya RN  Outcome: Progressing  Note: Instruct patient to call before getting up for safety.   10/25/2024 1751 by Hayley Maria RN  Outcome: Progressing  Flowsheets (Taken 10/25/2024 1751)  Free From Fall Injury:   Instruct family/caregiver on patient safety   Based on caregiver fall risk screen, instruct family/caregiver to ask for assistance with transferring infant if caregiver noted to have fall risk factors     Problem: Respiratory - Adult  Goal: Achieves optimal ventilation and oxygenation  10/26/2024 0220 by Abbie Amaya RN  Outcome: Progressing  Note: Encourage patient to breath in through his nose and and breath out through his mouth.   Instructed patient on the use of incentive spirometry  10/25/2024 1751 by Hayley Maria RN  Outcome: Progressing

## 2024-10-26 NOTE — PROGRESS NOTES
[facility]  Progress Note    Patient: Delfin Shetty Age: 68 y.o. : 1955 MR#: [id number] SSN: [id number]  Date/Time: 10/26/2024    Subjective:   Subjective   No acute events overnight, no new concerns or complaints, vitals stable.      Review of Systems   Constitutional: Negative for fever.      Assessment/Plan:   Severe sepsis  Large pleural effusion, not likely infected  Eosinophilia  Severe atelectasis secondary to pleural effusion  Developing tension hydrothorax  Acute hypoxic respiratory failure  Possible lung mass, concern mesothelioma  Suspected COPD with bullous emphysema  Hypokalemia  Cachexia  Thrombocytosis  Hypertension  Hyperlipidemia  Tobacco abuse    Plan  Chest tube put out less over the last 24 hours, pulmonology initiating lytics  Pulmonology on board to assist with multiple pulmonary issues including chest tube, appreciate their expertise as always  Infectious disease on board given initial presentation and concern for sepsis as a possible cause.  Continue Zosyn for time being given possibility of postobstructive pneumonia.    Follow-up pleural cultures/biopsies and fluid studies    Wean oxygen as tolerated    Disposition: Expected location: Home     Expected discharge date: 10/28/2024      Case discussed with:  [x]Patient  []Family  [x]Nursing  [x]Case Management  DVT Prophylaxis:  [x]Lovenox  []Hep SQ  [x]SCDs  []Coumadin   []On Heparin gtt   [] DOAC    Objective:   Objective:  General Appearance:  In no acute distress, not in pain, uncomfortable and ill-appearing.    Vital signs: (most recent): Blood pressure (!) 129/54, pulse (!) 119, temperature 98 °F (36.7 °C), temperature source Oral, resp. rate 27, height 1.702 m (5' 7\"), weight 65.3 kg (144 lb), SpO2 99%.  No fever.    HEENT: Normal HEENT exam.    Lungs:  Normal respiratory rate and increased effort.  There are decreased breath sounds and rhonchi.    Heart: Tachycardia.  Regular  20      Est, Glom Filt Rate >90 >60 ml/min/1.73m2    Calcium 8.3 (L) 8.5 - 10.1 MG/DL          Signed By: Júnior Colon MD     October 26, 2024 6:31 PM

## 2024-10-27 ENCOUNTER — APPOINTMENT (OUTPATIENT)
Facility: HOSPITAL | Age: 69
End: 2024-10-27
Payer: MEDICARE

## 2024-10-27 LAB
ANION GAP SERPL CALC-SCNC: 6 MMOL/L (ref 3–18)
BASOPHILS # BLD: 0.2 K/UL (ref 0–0.1)
BASOPHILS NFR BLD: 1 % (ref 0–2)
BUN SERPL-MCNC: 10 MG/DL (ref 7–18)
BUN/CREAT SERPL: 16 (ref 12–20)
CALCIUM SERPL-MCNC: 9 MG/DL (ref 8.5–10.1)
CHLORIDE SERPL-SCNC: 105 MMOL/L (ref 100–111)
CO2 SERPL-SCNC: 25 MMOL/L (ref 21–32)
CREAT SERPL-MCNC: 0.64 MG/DL (ref 0.6–1.3)
DIFFERENTIAL METHOD BLD: ABNORMAL
EOSINOPHIL # BLD: 5.6 K/UL (ref 0–0.4)
EOSINOPHIL NFR BLD: 30 % (ref 0–5)
ERYTHROCYTE [DISTWIDTH] IN BLOOD BY AUTOMATED COUNT: 12.6 % (ref 11.6–14.5)
GLUCOSE SERPL-MCNC: 91 MG/DL (ref 74–99)
HCT VFR BLD AUTO: 32.3 % (ref 36–48)
HGB BLD-MCNC: 10.4 G/DL (ref 13–16)
IMM GRANULOCYTES # BLD AUTO: 0.1 K/UL (ref 0–0.04)
IMM GRANULOCYTES NFR BLD AUTO: 0 % (ref 0–0.5)
LYMPHOCYTES # BLD: 2 K/UL (ref 0.9–3.6)
LYMPHOCYTES NFR BLD: 11 % (ref 21–52)
MCH RBC QN AUTO: 29.2 PG (ref 24–34)
MCHC RBC AUTO-ENTMCNC: 32.2 G/DL (ref 31–37)
MCV RBC AUTO: 90.7 FL (ref 78–100)
MONOCYTES # BLD: 1.9 K/UL (ref 0.05–1.2)
MONOCYTES NFR BLD: 10 % (ref 3–10)
NEUTS SEG # BLD: 9.1 K/UL (ref 1.8–8)
NEUTS SEG NFR BLD: 49 % (ref 40–73)
NRBC # BLD: 0 K/UL (ref 0–0.01)
NRBC BLD-RTO: 0 PER 100 WBC
PLATELET # BLD AUTO: 527 K/UL (ref 135–420)
PMV BLD AUTO: 8.8 FL (ref 9.2–11.8)
POTASSIUM SERPL-SCNC: 3.9 MMOL/L (ref 3.5–5.5)
RBC # BLD AUTO: 3.56 M/UL (ref 4.35–5.65)
SODIUM SERPL-SCNC: 136 MMOL/L (ref 136–145)
WBC # BLD AUTO: 18.9 K/UL (ref 4.6–13.2)

## 2024-10-27 PROCEDURE — 94640 AIRWAY INHALATION TREATMENT: CPT

## 2024-10-27 PROCEDURE — 99233 SBSQ HOSP IP/OBS HIGH 50: CPT | Performed by: STUDENT IN AN ORGANIZED HEALTH CARE EDUCATION/TRAINING PROGRAM

## 2024-10-27 PROCEDURE — 94669 MECHANICAL CHEST WALL OSCILL: CPT

## 2024-10-27 PROCEDURE — 71045 X-RAY EXAM CHEST 1 VIEW: CPT

## 2024-10-27 PROCEDURE — 0W9B3ZZ DRAINAGE OF LEFT PLEURAL CAVITY, PERCUTANEOUS APPROACH: ICD-10-PCS | Performed by: STUDENT IN AN ORGANIZED HEALTH CARE EDUCATION/TRAINING PROGRAM

## 2024-10-27 PROCEDURE — 6360000002 HC RX W HCPCS: Performed by: STUDENT IN AN ORGANIZED HEALTH CARE EDUCATION/TRAINING PROGRAM

## 2024-10-27 PROCEDURE — 99232 SBSQ HOSP IP/OBS MODERATE 35: CPT | Performed by: INTERNAL MEDICINE

## 2024-10-27 PROCEDURE — 2580000003 HC RX 258: Performed by: PHYSICIAN ASSISTANT

## 2024-10-27 PROCEDURE — 85025 COMPLETE CBC W/AUTO DIFF WBC: CPT

## 2024-10-27 PROCEDURE — 2700000000 HC OXYGEN THERAPY PER DAY

## 2024-10-27 PROCEDURE — 6370000000 HC RX 637 (ALT 250 FOR IP): Performed by: PHYSICIAN ASSISTANT

## 2024-10-27 PROCEDURE — 80048 BASIC METABOLIC PNL TOTAL CA: CPT

## 2024-10-27 PROCEDURE — 6360000002 HC RX W HCPCS: Performed by: HEALTH CARE PROVIDER

## 2024-10-27 PROCEDURE — 2580000003 HC RX 258: Performed by: INTERNAL MEDICINE

## 2024-10-27 PROCEDURE — 6370000000 HC RX 637 (ALT 250 FOR IP): Performed by: STUDENT IN AN ORGANIZED HEALTH CARE EDUCATION/TRAINING PROGRAM

## 2024-10-27 PROCEDURE — 6360000002 HC RX W HCPCS: Performed by: PHYSICIAN ASSISTANT

## 2024-10-27 PROCEDURE — 36415 COLL VENOUS BLD VENIPUNCTURE: CPT

## 2024-10-27 PROCEDURE — 6360000002 HC RX W HCPCS: Performed by: INTERNAL MEDICINE

## 2024-10-27 PROCEDURE — 1100000003 HC PRIVATE W/ TELEMETRY

## 2024-10-27 PROCEDURE — 32562 LYSE CHEST FIBRIN SUBQ DAY: CPT

## 2024-10-27 PROCEDURE — 94760 N-INVAS EAR/PLS OXIMETRY 1: CPT

## 2024-10-27 RX ADMIN — IPRATROPIUM BROMIDE 0.5 MG: 0.5 SOLUTION RESPIRATORY (INHALATION) at 08:16

## 2024-10-27 RX ADMIN — BUDESONIDE 1 MG: 1 SUSPENSION RESPIRATORY (INHALATION) at 20:13

## 2024-10-27 RX ADMIN — WATER 5 MG: 1 INJECTION INTRAMUSCULAR; INTRAVENOUS; SUBCUTANEOUS at 01:55

## 2024-10-27 RX ADMIN — ATORVASTATIN CALCIUM 10 MG: 10 TABLET, FILM COATED ORAL at 22:10

## 2024-10-27 RX ADMIN — BUDESONIDE 1 MG: 1 SUSPENSION RESPIRATORY (INHALATION) at 08:16

## 2024-10-27 RX ADMIN — PIPERACILLIN AND TAZOBACTAM 3375 MG: 3; .375 INJECTION, POWDER, LYOPHILIZED, FOR SOLUTION INTRAVENOUS at 22:14

## 2024-10-27 RX ADMIN — THERA TABS 1 TABLET: TAB at 09:30

## 2024-10-27 RX ADMIN — SODIUM CHLORIDE: 9 INJECTION, SOLUTION INTRAVENOUS at 13:15

## 2024-10-27 RX ADMIN — ARFORMOTEROL TARTRATE 15 MCG: 15 SOLUTION RESPIRATORY (INHALATION) at 20:13

## 2024-10-27 RX ADMIN — ALTEPLASE 10 MG: 2.2 INJECTION, POWDER, LYOPHILIZED, FOR SOLUTION INTRAVENOUS at 01:55

## 2024-10-27 RX ADMIN — ENOXAPARIN SODIUM 40 MG: 100 INJECTION SUBCUTANEOUS at 09:30

## 2024-10-27 RX ADMIN — SODIUM CHLORIDE, PRESERVATIVE FREE 10 ML: 5 INJECTION INTRAVENOUS at 09:30

## 2024-10-27 RX ADMIN — IPRATROPIUM BROMIDE 0.5 MG: 0.5 SOLUTION RESPIRATORY (INHALATION) at 12:33

## 2024-10-27 RX ADMIN — MORPHINE SULFATE 4 MG: 4 INJECTION, SOLUTION INTRAMUSCULAR; INTRAVENOUS at 17:05

## 2024-10-27 RX ADMIN — MORPHINE SULFATE 4 MG: 4 INJECTION, SOLUTION INTRAMUSCULAR; INTRAVENOUS at 14:25

## 2024-10-27 RX ADMIN — MORPHINE SULFATE 4 MG: 4 INJECTION, SOLUTION INTRAMUSCULAR; INTRAVENOUS at 02:00

## 2024-10-27 RX ADMIN — PIPERACILLIN AND TAZOBACTAM 3375 MG: 3; .375 INJECTION, POWDER, LYOPHILIZED, FOR SOLUTION INTRAVENOUS at 13:16

## 2024-10-27 RX ADMIN — IPRATROPIUM BROMIDE 0.5 MG: 0.5 SOLUTION RESPIRATORY (INHALATION) at 16:47

## 2024-10-27 RX ADMIN — ALTEPLASE 10 MG: 2.2 INJECTION, POWDER, LYOPHILIZED, FOR SOLUTION INTRAVENOUS at 13:16

## 2024-10-27 RX ADMIN — IPRATROPIUM BROMIDE 0.5 MG: 0.5 SOLUTION RESPIRATORY (INHALATION) at 20:13

## 2024-10-27 RX ADMIN — PIPERACILLIN AND TAZOBACTAM 3375 MG: 3; .375 INJECTION, POWDER, LYOPHILIZED, FOR SOLUTION INTRAVENOUS at 05:25

## 2024-10-27 RX ADMIN — SODIUM CHLORIDE, PRESERVATIVE FREE 10 ML: 5 INJECTION INTRAVENOUS at 22:12

## 2024-10-27 RX ADMIN — ARFORMOTEROL TARTRATE 15 MCG: 15 SOLUTION RESPIRATORY (INHALATION) at 08:15

## 2024-10-27 RX ADMIN — MORPHINE SULFATE 4 MG: 4 INJECTION, SOLUTION INTRAMUSCULAR; INTRAVENOUS at 09:31

## 2024-10-27 RX ADMIN — WATER 5 MG: 1 INJECTION INTRAMUSCULAR; INTRAVENOUS; SUBCUTANEOUS at 13:16

## 2024-10-27 ASSESSMENT — PAIN - FUNCTIONAL ASSESSMENT
PAIN_FUNCTIONAL_ASSESSMENT: ACTIVITIES ARE NOT PREVENTED
PAIN_FUNCTIONAL_ASSESSMENT: PREVENTS OR INTERFERES SOME ACTIVE ACTIVITIES AND ADLS
PAIN_FUNCTIONAL_ASSESSMENT: PREVENTS OR INTERFERES SOME ACTIVE ACTIVITIES AND ADLS
PAIN_FUNCTIONAL_ASSESSMENT: ACTIVITIES ARE NOT PREVENTED

## 2024-10-27 ASSESSMENT — PAIN DESCRIPTION - DESCRIPTORS
DESCRIPTORS: ACHING;SORE
DESCRIPTORS: ACHING;DISCOMFORT
DESCRIPTORS: ACHING;SORE
DESCRIPTORS: ACHING;SORE

## 2024-10-27 ASSESSMENT — PAIN DESCRIPTION - ONSET
ONSET: PROGRESSIVE

## 2024-10-27 ASSESSMENT — PAIN SCALES - GENERAL
PAINLEVEL_OUTOF10: 8
PAINLEVEL_OUTOF10: 7
PAINLEVEL_OUTOF10: 7
PAINLEVEL_OUTOF10: 8
PAINLEVEL_OUTOF10: 0

## 2024-10-27 ASSESSMENT — PAIN DESCRIPTION - PAIN TYPE
TYPE: ACUTE PAIN
TYPE: SURGICAL PAIN
TYPE: ACUTE PAIN
TYPE: ACUTE PAIN

## 2024-10-27 ASSESSMENT — PAIN DESCRIPTION - FREQUENCY
FREQUENCY: INTERMITTENT

## 2024-10-27 ASSESSMENT — PAIN DESCRIPTION - ORIENTATION
ORIENTATION: LEFT;MID
ORIENTATION: LEFT;MID
ORIENTATION: RIGHT;ANTERIOR
ORIENTATION: LEFT;MID

## 2024-10-27 ASSESSMENT — PAIN DESCRIPTION - LOCATION
LOCATION: BACK;RIB CAGE
LOCATION: BACK;CHEST
LOCATION: CHEST
LOCATION: BACK

## 2024-10-27 NOTE — PROCEDURES
[facility]  Pulmonary, Critical Care, and Sleep Medicine  TPA/Dornase instillation via Chest Tube Procedure     Indication/ pre procedure diagnosis: empyema   Post procedure diagnosis: same     Chest tube clamped. Aseptic technique was observed. Dornase hugh 5 mg/30ml followed by Alteplase 10 mg/30ml instilled into LEFT pleural cavity via self sealing chest tube.   Instructions given to nursing to reposition pt q 15 minutes x4, and unclamp tube and place back on wall suction at 20mmhg after 1 hour at 1420.  Pt tolerated procedure well.     GREY CARLSON PA-C  10/27/24  Pulmonary, Critical Care Medicine  [facility]

## 2024-10-27 NOTE — PROGRESS NOTES
Bedside and Verbal shift change report given to ASHLEY Fiore & ASHLEY Abad (oncoming nurse) by ASHLEY Mcgraw (offgoing nurse). Report included the following information Nurse Handoff Report, Index, Adult Overview, Surgery Report, Intake/Output, MAR, Cardiac Rhythm Sinus Tachy, Neuro Assessment, and Event Log.

## 2024-10-27 NOTE — PROGRESS NOTES
1930: 4 Eyes Skin Assessment     NAME:  Delfin Shetty  YOB: 1955  MEDICAL RECORD NUMBER:  [id number]    The patient is being assessed for  Shift Handoff    I agree that at least one RN has performed a thorough Head to Toe Skin Assessment on the patient. ALL assessment sites listed below have been assessed.      Areas assessed by both nurses:    Head, Face, Ears, Shoulders, Back, Chest, Arms, Elbows, Hands, Sacrum. Buttock, Coccyx, Ischium, and Legs. Feet and Heels        Does the Patient have a Wound? Yes wound(s) were present on assessment. LDA wound assessment was Initiated and completed by RN       Valentin Prevention initiated by RN: Yes  Wound Care Orders initiated by RN: No    Pressure Injury (Stage 3,4, Unstageable, DTI, NWPT, and Complex wounds) if present, place Wound referral order by RN under : No    New Ostomies, if present place, Ostomy referral order under : No     Nurse 1 eSignature: Electronically signed by Macarena Ledesma RN on 10/27/24 at 7:42 PM EDT    **SHARE this note so that the co-signing nurse can place an eSignature**    Nurse 2 eSignature: Electronically signed by Jenniffer Ward RN on 10/27/24 at 7:43 PM EDT

## 2024-10-27 NOTE — PROGRESS NOTES
[facility]  Progress Note    Patient: Delfin Sehtty Age: 68 y.o. : 1955 MR#: [id number] SSN: [id number]  Date/Time: 10/27/2024    Subjective:   Subjective   No acute events overnight, no new concerns or complaints, vitals stable.  Ongoing lytic therapy per pulmonology    Review of Systems   Constitutional: Negative for fever.      Assessment/Plan:   Severe sepsis  Large pleural effusion, not likely infected  Eosinophilia  Severe atelectasis secondary to pleural effusion  Developing tension hydrothorax  Acute hypoxic respiratory failure  Possible lung mass, concern mesothelioma  Suspected COPD with bullous emphysema  Hypokalemia  Cachexia  Thrombocytosis  Hypertension  Hyperlipidemia  Tobacco abuse  Eosinophilia    Plan  Improved output from chest tube.  Improved aeration on chest x-ray.  Infectious disease on board given initial presentation and concern for sepsis as a possible cause.  Continue Zosyn for time being given possibility of postobstructive pneumonia.  Increased white blood cell count today from yesterday with jump and relative eosinophil    Follow-up pleural cultures/biopsies and fluid studies    Wean oxygen as tolerated    Disposition: Expected location: Home     Expected discharge date: 10/28/2024      Case discussed with:  [x]Patient  []Family  [x]Nursing  [x]Case Management  DVT Prophylaxis:  [x]Lovenox  []Hep SQ  [x]SCDs  []Coumadin   []On Heparin gtt   [] DOAC    Objective:   Objective:  General Appearance:  In no acute distress, not in pain, uncomfortable and ill-appearing.    Vital signs: (most recent): Blood pressure 133/62, pulse (!) 117, temperature 98.5 °F (36.9 °C), temperature source Oral, resp. rate 30, height 1.702 m (5' 7\"), weight 65.3 kg (144 lb), SpO2 95%.  No fever.    HEENT: Normal HEENT exam.    Lungs:  Normal respiratory rate and increased effort.  There are decreased breath sounds and rhonchi.    Heart: Tachycardia.

## 2024-10-27 NOTE — PROCEDURES
PROCEDURE NOTE  Date: 10/27/2024   Name: Delfin Shetty  YOB: 1955    Procedures  [facility]  Pulmonary, Critical Care, and Sleep Medicine  TPA/Dornase instillation via Chest Tube Procedure     Indication/ pre procedure diagnosis: empyema   Post procedure diagnosis: same     Chest tube clamped. Aseptic technique was observed. Dornase hugh 5 mg/30ml followed by Alteplase 10 mg/30ml instilled into LEFT pleural cavity via self sealing chest tube.   Instructions given to nursing to reposition pt q 15 minutes x4, and unclamp tube and place back on wall suction at 20mmhg after 1 hour at 02:54  Pt tolerated procedure well.     Tika Alonso PA-C  10/27/24  Pulmonary, Critical Care Medicine  [facility]

## 2024-10-27 NOTE — PLAN OF CARE
Problem: Discharge Planning  Goal: Discharge to home or other facility with appropriate resources  Outcome: Progressing  Flowsheets (Taken 10/26/2024 1924)  Discharge to home or other facility with appropriate resources:   Identify barriers to discharge with patient and caregiver   Arrange for needed discharge resources and transportation as appropriate   Identify discharge learning needs (meds, wound care, etc)     Problem: Pain  Goal: Verbalizes/displays adequate comfort level or baseline comfort level  Outcome: Progressing  Flowsheets  Taken 10/26/2024 1855 by Caroline Lorenzo RN  Verbalizes/displays adequate comfort level or baseline comfort level:   Encourage patient to monitor pain and request assistance   Assess pain using appropriate pain scale  Taken 10/26/2024 1755 by Caroline Lorenzo RN  Verbalizes/displays adequate comfort level or baseline comfort level:   Encourage patient to monitor pain and request assistance   Assess pain using appropriate pain scale  Taken 10/26/2024 1520 by Caroline oLrenzo RN  Verbalizes/displays adequate comfort level or baseline comfort level:   Encourage patient to monitor pain and request assistance   Assess pain using appropriate pain scale     Problem: Safety - Adult  Goal: Free from fall injury  Outcome: Progressing     Problem: Skin/Tissue Integrity  Goal: Absence of new skin breakdown  Description: 1.  Monitor for areas of redness and/or skin breakdown  2.  Assess vascular access sites hourly  3.  Every 4-6 hours minimum:  Change oxygen saturation probe site  4.  Every 4-6 hours:  If on nasal continuous positive airway pressure, respiratory therapy assess nares and determine need for appliance change or resting period.  Outcome: Progressing     Problem: Respiratory - Adult  Goal: Achieves optimal ventilation and oxygenation  Outcome: Progressing  Flowsheets (Taken 10/26/2024 1924)  Achieves optimal ventilation and oxygenation:   Assess for changes in mentation and  safest level of function  Outcome: Progressing  Flowsheets (Taken 10/26/2024 1924)  Return Mobility to Safest Level of Function:   Assess patient stability and activity tolerance for standing, transferring and ambulating with or without assistive devices   Assist with transfers and ambulation using safe patient handling equipment as needed   Ensure adequate protection for wounds/incisions during mobilization   Apply continuous passive motion per provider or physical therapy orders to increase flexion toward goal  Goal: Maintain proper alignment of affected body part  Outcome: Progressing  Flowsheets (Taken 10/26/2024 1924)  Maintain proper alignment of affected body part:   Support and protect limb and body alignment per provider's orders   Instruct and reinforce with patient and family use of appropriate assistive device and precautions (e.g. spinal or hip dislocation precautions)  Goal: Return ADL status to a safe level of function  Outcome: Progressing  Flowsheets (Taken 10/26/2024 1924)  Return ADL Status to a Safe Level of Function:   Administer medication as ordered   Assess activities of daily living deficits and provide assistive devices as needed   Obtain physical therapy/occupational therapy consults as needed     Problem: Gastrointestinal - Adult  Goal: Maintains or returns to baseline bowel function  Outcome: Progressing  Flowsheets (Taken 10/26/2024 1924)  Maintains or returns to baseline bowel function:   Assess bowel function   Encourage oral fluids to ensure adequate hydration   Administer IV fluids as ordered to ensure adequate hydration   Administer ordered medications as needed  Goal: Maintains adequate nutritional intake  Outcome: Progressing  Flowsheets (Taken 10/26/2024 1924)  Maintains adequate nutritional intake:   Monitor percentage of each meal consumed   Identify factors contributing to decreased intake, treat as appropriate   Assist with meals as needed   Monitor intake and output, weight

## 2024-10-27 NOTE — PROGRESS NOTES
[facility]  Pulmonary, Critical Care, and Sleep Medicine    Pulmonary Medicine Progress Note    Name: Delfin Shetty MRN: [id number]  : 1955 Hospital: [facility]  Date: 10/27/2024       Subjective:  Pt without complaints. 600cc out yesterday, 500cc out this AM from CT. 2 doses of lytics. Breathing well.      Patient Active Problem List   Diagnosis    Severe sepsis (HCC)    Primary hypertension    HLD (hyperlipidemia)    Acute respiratory failure with hypoxia    Hypokalemia    Eosinophilia    Thrombocytosis    Bullous emphysema (HCC)    Empyema lung (HCC)    Large pleural effusion    Pneumonia of left lung due to infectious organism    Acute respiratory failure with hypoxemia    Sepsis due to pneumonia (HCC)       Assessment/PLAN:    IMPRESSION:   Large L pleural effusion with loculations and tension contralateral mediastinal shift. Concern for mesothelioma given appearance of pleura or metastatic lung cancer given mass like consolidation L apex. Cannot R/o pneumonia ?post obstructive with leukocytosis and resulting parapneumonic effusion  - s/p chest tube 10/24- exudative fluid- lymphocytic and eosinophilic. Not likely infected  Flu and COVI PCR negative  Acute hypoxic respiratory failure due to combination of passive atelectasis, consolidation, COPD  COPD with bullous emphysema on imaging, not in exacerbation  Unintended weight loss concerning for malignancy  Former smoker  Hypertension   Dyslipidemia        RECOMMENDATIONS:   Will start intrapleural lytics- dornase/tpase- Ct to suction otherwise. 2/6 given  Daily CXR- improved aeration today  Eosinophilic exudate noted, malignancy remains the most likely- less likely parasitic infection or autoimmune.   Awaiting cytology  Titrate supplemental O2 to saturation greater than 90%  Assess home O2 needs prior to discharge  Bronchodilators prn  Baseline PFT as outpatient  Encourage IS and bronchial hygiene  Empiric

## 2024-10-27 NOTE — PROGRESS NOTES
1550: Chest Tube unclamped, draining dark red fluid.    1830: Total chest tube output 400 from 3258-5521.    1930: Bedside and Verbal shift change report given to ASHLEY Dey (oncoming nurse) by ASHLEY Fiore and ASHLEY Abad (offgoing nurse). Report included the following information Nurse Handoff Report, Index, Adult Overview, Intake/Output, MAR, Cardiac Rhythm NSR-ST, Alarm Parameters, and Quality Measures.

## 2024-10-27 NOTE — PROGRESS NOTES
4 Eyes Skin Assessment     NAME:  Delfin Shetty  YOB: 1955  MEDICAL RECORD NUMBER:  [id number]    The patient is being assessed for  Shift Handoff    I agree that at least one RN has performed a thorough Head to Toe Skin Assessment on the patient. ALL assessment sites listed below have been assessed.      Areas assessed by both nurses:    Head, Face, Ears, Shoulders, Back, Chest, Arms, Elbows, Hands, Sacrum. Buttock, Coccyx, Ischium, Legs. Feet and Heels, and Under Medical Devices         Does the Patient have a Wound? No noted wound(s)       Valentin Prevention initiated by RN: Yes  Wound Care Orders initiated by RN: No    Pressure Injury (Stage 3,4, Unstageable, DTI, NWPT, and Complex wounds) if present, place Wound referral order by RN under : No    New Ostomies, if present place, Ostomy referral order under : No     Nurse 1 eSignature: Electronically signed by Tiburcio Gonzalez RN on 10/27/24 at 7:50 AM EDT    **SHARE this note so that the co-signing nurse can place an eSignature**    Nurse 2 eSignature: Electronically signed by Macarena Ledesma RN on 10/27/24 at 7:45 PM EDT

## 2024-10-27 NOTE — PLAN OF CARE
Problem: Discharge Planning  Goal: Discharge to home or other facility with appropriate resources  10/27/2024 0428 by Tiburcio Gonzalez RN  Outcome: Progressing  Flowsheets (Taken 10/26/2024 1924)  Discharge to home or other facility with appropriate resources:   Identify barriers to discharge with patient and caregiver   Arrange for needed discharge resources and transportation as appropriate   Identify discharge learning needs (meds, wound care, etc)     Problem: Pain  Goal: Verbalizes/displays adequate comfort level or baseline comfort level  10/27/2024 0428 by Tiburico Gonzalez RN  Outcome: Progressing  Flowsheets  Taken 10/26/2024 1855 by Caroline Lorenzo RN  Verbalizes/displays adequate comfort level or baseline comfort level:   Encourage patient to monitor pain and request assistance   Assess pain using appropriate pain scale  Taken 10/26/2024 1755 by Caroline Lorenzo RN  Verbalizes/displays adequate comfort level or baseline comfort level:   Encourage patient to monitor pain and request assistance   Assess pain using appropriate pain scale  Taken 10/26/2024 1520 by Caroline Lorenzo RN  Verbalizes/displays adequate comfort level or baseline comfort level:   Encourage patient to monitor pain and request assistance   Assess pain using appropriate pain scale     Problem: Safety - Adult  Goal: Free from fall injury  10/27/2024 1626 by Macarena Ledesma RN  Outcome: Progressing  Flowsheets (Taken 10/27/2024 1626)  Free From Fall Injury: Instruct family/caregiver on patient safety  Note: Patient will utilize call light for assistance with toileting needs. Bed alarm is on. Call light within reach.  10/27/2024 0428 by Tiburcio Gonzalez RN  Outcome: Progressing     Problem: Skin/Tissue Integrity  Goal: Absence of new skin breakdown  Description: 1.  Monitor for areas of redness and/or skin breakdown  2.  Assess vascular access sites hourly  3.  Every 4-6 hours minimum:  Change oxygen saturation probe site  4.  Every 4-6  Assess activities of daily living deficits and provide assistive devices as needed   Obtain physical therapy/occupational therapy consults as needed     Problem: Gastrointestinal - Adult  Goal: Maintains or returns to baseline bowel function  10/27/2024 1626 by Macarena Ledesma RN  Outcome: Progressing  10/27/2024 0428 by Tiburcio Gonzalez RN  Outcome: Progressing  Flowsheets (Taken 10/26/2024 1924)  Maintains or returns to baseline bowel function:   Assess bowel function   Encourage oral fluids to ensure adequate hydration   Administer IV fluids as ordered to ensure adequate hydration   Administer ordered medications as needed  Goal: Maintains adequate nutritional intake  10/27/2024 0428 by Tiburcio Gonzalez RN  Outcome: Progressing  Flowsheets (Taken 10/26/2024 1924)  Maintains adequate nutritional intake:   Monitor percentage of each meal consumed   Identify factors contributing to decreased intake, treat as appropriate   Assist with meals as needed   Monitor intake and output, weight and lab values     Problem: Infection - Adult  Goal: Absence of infection at discharge  10/27/2024 0428 by Tiburcio Gonzalez RN  Outcome: Progressing  Flowsheets (Taken 10/26/2024 1924)  Absence of infection at discharge:   Assess and monitor for signs and symptoms of infection   Monitor lab/diagnostic results   Monitor all insertion sites i.e., indwelling lines, tubes and drains  Goal: Absence of infection during hospitalization  10/27/2024 0428 by Tiburcio Gonzalez RN  Outcome: Progressing  Flowsheets (Taken 10/26/2024 1924)  Absence of infection during hospitalization:   Assess and monitor for signs and symptoms of infection   Monitor lab/diagnostic results   Monitor all insertion sites i.e., indwelling lines, tubes and drains  Goal: Absence of fever/infection during anticipated neutropenic period  10/27/2024 0428 by Tiburcio Gonzalez RN  Outcome: Progressing  Flowsheets (Taken 10/26/2024 1924)  Absence of fever/infection during

## 2024-10-28 ENCOUNTER — APPOINTMENT (OUTPATIENT)
Facility: HOSPITAL | Age: 69
End: 2024-10-28
Payer: MEDICARE

## 2024-10-28 PROBLEM — E43 SEVERE PROTEIN-CALORIE MALNUTRITION (HCC): Status: ACTIVE | Noted: 2024-10-28

## 2024-10-28 LAB
ANION GAP SERPL CALC-SCNC: 6 MMOL/L (ref 3–18)
BACTERIA SPEC CULT: NORMAL
BASOPHILS # BLD: 0 K/UL (ref 0–0.1)
BASOPHILS NFR BLD: 0 % (ref 0–2)
BUN SERPL-MCNC: 12 MG/DL (ref 7–18)
BUN/CREAT SERPL: 17 (ref 12–20)
CALCIUM SERPL-MCNC: 9 MG/DL (ref 8.5–10.1)
CHLORIDE SERPL-SCNC: 101 MMOL/L (ref 100–111)
CO2 SERPL-SCNC: 27 MMOL/L (ref 21–32)
CREAT SERPL-MCNC: 0.7 MG/DL (ref 0.6–1.3)
DIFFERENTIAL METHOD BLD: ABNORMAL
EOSINOPHIL # BLD: 5 K/UL (ref 0–0.4)
EOSINOPHIL NFR BLD: 25 % (ref 0–5)
ERYTHROCYTE [DISTWIDTH] IN BLOOD BY AUTOMATED COUNT: 12.6 % (ref 11.6–14.5)
GLUCOSE SERPL-MCNC: 116 MG/DL (ref 74–99)
GRAM STN SPEC: NORMAL
HCT VFR BLD AUTO: 33.8 % (ref 36–48)
HGB BLD-MCNC: 10.7 G/DL (ref 13–16)
IMM GRANULOCYTES # BLD AUTO: 0 K/UL (ref 0–0.04)
IMM GRANULOCYTES NFR BLD AUTO: 0 % (ref 0–0.5)
LYMPHOCYTES # BLD: 2.6 K/UL (ref 0.9–3.6)
LYMPHOCYTES NFR BLD: 13 % (ref 21–52)
MCH RBC QN AUTO: 29 PG (ref 24–34)
MCHC RBC AUTO-ENTMCNC: 31.7 G/DL (ref 31–37)
MCV RBC AUTO: 91.6 FL (ref 78–100)
MONOCYTES # BLD: 1.8 K/UL (ref 0.05–1.2)
MONOCYTES NFR BLD: 9 % (ref 3–10)
NEUTS SEG # BLD: 10.7 K/UL (ref 1.8–8)
NEUTS SEG NFR BLD: 53 % (ref 40–73)
NRBC # BLD: 0 K/UL (ref 0–0.01)
NRBC BLD-RTO: 0 PER 100 WBC
PLATELET # BLD AUTO: 554 K/UL (ref 135–420)
PLATELET COMMENT: ABNORMAL
PMV BLD AUTO: 9.1 FL (ref 9.2–11.8)
POTASSIUM SERPL-SCNC: 4.3 MMOL/L (ref 3.5–5.5)
RBC # BLD AUTO: 3.69 M/UL (ref 4.35–5.65)
RBC MORPH BLD: ABNORMAL
SERVICE CMNT-IMP: NORMAL
SODIUM SERPL-SCNC: 134 MMOL/L (ref 136–145)
WBC # BLD AUTO: 20.1 K/UL (ref 4.6–13.2)

## 2024-10-28 PROCEDURE — 6360000002 HC RX W HCPCS: Performed by: PHYSICIAN ASSISTANT

## 2024-10-28 PROCEDURE — 94761 N-INVAS EAR/PLS OXIMETRY MLT: CPT

## 2024-10-28 PROCEDURE — 71045 X-RAY EXAM CHEST 1 VIEW: CPT

## 2024-10-28 PROCEDURE — 6360000002 HC RX W HCPCS: Performed by: STUDENT IN AN ORGANIZED HEALTH CARE EDUCATION/TRAINING PROGRAM

## 2024-10-28 PROCEDURE — 2580000003 HC RX 258: Performed by: PHYSICIAN ASSISTANT

## 2024-10-28 PROCEDURE — 32562 LYSE CHEST FIBRIN SUBQ DAY: CPT

## 2024-10-28 PROCEDURE — APPSS15 APP SPLIT SHARED TIME 0-15 MINUTES

## 2024-10-28 PROCEDURE — 0W9B3ZZ DRAINAGE OF LEFT PLEURAL CAVITY, PERCUTANEOUS APPROACH: ICD-10-PCS | Performed by: STUDENT IN AN ORGANIZED HEALTH CARE EDUCATION/TRAINING PROGRAM

## 2024-10-28 PROCEDURE — 99232 SBSQ HOSP IP/OBS MODERATE 35: CPT | Performed by: STUDENT IN AN ORGANIZED HEALTH CARE EDUCATION/TRAINING PROGRAM

## 2024-10-28 PROCEDURE — 6370000000 HC RX 637 (ALT 250 FOR IP): Performed by: PHYSICIAN ASSISTANT

## 2024-10-28 PROCEDURE — 94669 MECHANICAL CHEST WALL OSCILL: CPT

## 2024-10-28 PROCEDURE — 6360000002 HC RX W HCPCS: Performed by: HEALTH CARE PROVIDER

## 2024-10-28 PROCEDURE — 32562 LYSE CHEST FIBRIN SUBQ DAY: CPT | Performed by: PHYSICIAN ASSISTANT

## 2024-10-28 PROCEDURE — 94640 AIRWAY INHALATION TREATMENT: CPT

## 2024-10-28 PROCEDURE — 80048 BASIC METABOLIC PNL TOTAL CA: CPT

## 2024-10-28 PROCEDURE — 1100000003 HC PRIVATE W/ TELEMETRY

## 2024-10-28 PROCEDURE — 36415 COLL VENOUS BLD VENIPUNCTURE: CPT

## 2024-10-28 PROCEDURE — 2700000000 HC OXYGEN THERAPY PER DAY

## 2024-10-28 PROCEDURE — 2580000003 HC RX 258: Performed by: INTERNAL MEDICINE

## 2024-10-28 PROCEDURE — 6370000000 HC RX 637 (ALT 250 FOR IP): Performed by: STUDENT IN AN ORGANIZED HEALTH CARE EDUCATION/TRAINING PROGRAM

## 2024-10-28 PROCEDURE — 85025 COMPLETE CBC W/AUTO DIFF WBC: CPT

## 2024-10-28 PROCEDURE — 6360000002 HC RX W HCPCS: Performed by: INTERNAL MEDICINE

## 2024-10-28 PROCEDURE — 99232 SBSQ HOSP IP/OBS MODERATE 35: CPT | Performed by: INTERNAL MEDICINE

## 2024-10-28 RX ADMIN — PIPERACILLIN AND TAZOBACTAM 3375 MG: 3; .375 INJECTION, POWDER, LYOPHILIZED, FOR SOLUTION INTRAVENOUS at 13:15

## 2024-10-28 RX ADMIN — BUDESONIDE 1 MG: 1 SUSPENSION RESPIRATORY (INHALATION) at 21:12

## 2024-10-28 RX ADMIN — SODIUM CHLORIDE: 9 INJECTION, SOLUTION INTRAVENOUS at 13:15

## 2024-10-28 RX ADMIN — PIPERACILLIN AND TAZOBACTAM 3375 MG: 3; .375 INJECTION, POWDER, LYOPHILIZED, FOR SOLUTION INTRAVENOUS at 05:48

## 2024-10-28 RX ADMIN — MORPHINE SULFATE 4 MG: 4 INJECTION, SOLUTION INTRAMUSCULAR; INTRAVENOUS at 01:30

## 2024-10-28 RX ADMIN — ENOXAPARIN SODIUM 40 MG: 100 INJECTION SUBCUTANEOUS at 09:06

## 2024-10-28 RX ADMIN — IPRATROPIUM BROMIDE 0.5 MG: 0.5 SOLUTION RESPIRATORY (INHALATION) at 13:20

## 2024-10-28 RX ADMIN — IPRATROPIUM BROMIDE 0.5 MG: 0.5 SOLUTION RESPIRATORY (INHALATION) at 07:29

## 2024-10-28 RX ADMIN — ARFORMOTEROL TARTRATE 15 MCG: 15 SOLUTION RESPIRATORY (INHALATION) at 07:29

## 2024-10-28 RX ADMIN — SODIUM CHLORIDE, PRESERVATIVE FREE 10 ML: 5 INJECTION INTRAVENOUS at 09:07

## 2024-10-28 RX ADMIN — WATER 5 MG: 1 INJECTION INTRAMUSCULAR; INTRAVENOUS; SUBCUTANEOUS at 02:00

## 2024-10-28 RX ADMIN — ALTEPLASE 10 MG: 2.2 INJECTION, POWDER, LYOPHILIZED, FOR SOLUTION INTRAVENOUS at 02:00

## 2024-10-28 RX ADMIN — MORPHINE SULFATE 4 MG: 4 INJECTION, SOLUTION INTRAMUSCULAR; INTRAVENOUS at 16:25

## 2024-10-28 RX ADMIN — ATORVASTATIN CALCIUM 10 MG: 10 TABLET, FILM COATED ORAL at 20:07

## 2024-10-28 RX ADMIN — SODIUM CHLORIDE, PRESERVATIVE FREE 10 ML: 5 INJECTION INTRAVENOUS at 20:07

## 2024-10-28 RX ADMIN — ARFORMOTEROL TARTRATE 15 MCG: 15 SOLUTION RESPIRATORY (INHALATION) at 21:12

## 2024-10-28 RX ADMIN — MORPHINE SULFATE 2 MG: 2 INJECTION, SOLUTION INTRAMUSCULAR; INTRAVENOUS at 13:19

## 2024-10-28 RX ADMIN — IPRATROPIUM BROMIDE 0.5 MG: 0.5 SOLUTION RESPIRATORY (INHALATION) at 21:12

## 2024-10-28 RX ADMIN — WATER 5 MG: 1 INJECTION INTRAMUSCULAR; INTRAVENOUS; SUBCUTANEOUS at 14:35

## 2024-10-28 RX ADMIN — THERA TABS 1 TABLET: TAB at 09:06

## 2024-10-28 RX ADMIN — ALTEPLASE 10 MG: 2.2 INJECTION, POWDER, LYOPHILIZED, FOR SOLUTION INTRAVENOUS at 14:35

## 2024-10-28 RX ADMIN — BUDESONIDE 1 MG: 1 SUSPENSION RESPIRATORY (INHALATION) at 07:29

## 2024-10-28 RX ADMIN — PIPERACILLIN AND TAZOBACTAM 3375 MG: 3; .375 INJECTION, POWDER, LYOPHILIZED, FOR SOLUTION INTRAVENOUS at 21:16

## 2024-10-28 ASSESSMENT — PAIN DESCRIPTION - ORIENTATION
ORIENTATION: LEFT
ORIENTATION: LEFT;ANTERIOR
ORIENTATION: LEFT

## 2024-10-28 ASSESSMENT — PAIN SCALES - GENERAL
PAINLEVEL_OUTOF10: 0
PAINLEVEL_OUTOF10: 0
PAINLEVEL_OUTOF10: 3
PAINLEVEL_OUTOF10: 0
PAINLEVEL_OUTOF10: 5
PAINLEVEL_OUTOF10: 8
PAINLEVEL_OUTOF10: 7

## 2024-10-28 ASSESSMENT — PAIN DESCRIPTION - FREQUENCY
FREQUENCY: INTERMITTENT

## 2024-10-28 ASSESSMENT — PAIN - FUNCTIONAL ASSESSMENT
PAIN_FUNCTIONAL_ASSESSMENT: ACTIVITIES ARE NOT PREVENTED
PAIN_FUNCTIONAL_ASSESSMENT: PREVENTS OR INTERFERES SOME ACTIVE ACTIVITIES AND ADLS
PAIN_FUNCTIONAL_ASSESSMENT: ACTIVITIES ARE NOT PREVENTED

## 2024-10-28 ASSESSMENT — PAIN DESCRIPTION - DESCRIPTORS
DESCRIPTORS: ACHING;SORE
DESCRIPTORS: ACHING;DISCOMFORT
DESCRIPTORS: ACHING;SORE

## 2024-10-28 ASSESSMENT — PAIN DESCRIPTION - ONSET
ONSET: PROGRESSIVE

## 2024-10-28 ASSESSMENT — PAIN DESCRIPTION - LOCATION
LOCATION: INCISION;RIB CAGE
LOCATION: RIB CAGE
LOCATION: RIB CAGE

## 2024-10-28 ASSESSMENT — PAIN DESCRIPTION - PAIN TYPE
TYPE: ACUTE PAIN;SURGICAL PAIN
TYPE: SURGICAL PAIN
TYPE: SURGICAL PAIN

## 2024-10-28 NOTE — PROGRESS NOTES
4 Eyes Skin Assessment     NAME:  Delfin Shetty  YOB: 1955  MEDICAL RECORD NUMBER:  [id number]    The patient is being assessed for  Shift Handoff    I agree that at least one RN has performed a thorough Head to Toe Skin Assessment on the patient. ALL assessment sites listed below have been assessed.      Areas assessed by both nurses:    Head, Face, Ears, Shoulders, Back, Chest, Arms, Elbows, Hands, Sacrum. Buttock, Coccyx, Ischium, Legs. Feet and Heels, and Under Medical Devices         Does the Patient have a Wound? Yes wound(s) were present on assessment. LDA wound assessment was Initiated and completed by RN, Incision Site       Valentin Prevention initiated by RN: Yes  Wound Care Orders initiated by RN: Yes    Pressure Injury (Stage 3,4, Unstageable, DTI, NWPT, and Complex wounds) if present, place Wound referral order by RN under : No    New Ostomies, if present place, Ostomy referral order under : No     Nurse 1 eSignature: Electronically signed by Mariluz Felix RN on 10/28/24 at 4:53 PM EDT    **SHARE this note so that the co-signing nurse can place an eSignature**    Nurse 2 eSignature: Electronically signed by BEL PALOMINO RN on 10/29/24 at 7:00 AM EDT

## 2024-10-28 NOTE — PROGRESS NOTES
4 Eyes Skin Assessment     NAME:  Delfin Shetty  YOB: 1955  MEDICAL RECORD NUMBER:  [id number]    The patient is being assessed for  Shift Handoff    I agree that at least one RN has performed a thorough Head to Toe Skin Assessment on the patient. ALL assessment sites listed below have been assessed.      Areas assessed by both nurses:    Head, Face, Ears, Shoulders, Back, Chest, Arms, Elbows, Hands, Sacrum. Buttock, Coccyx, Ischium, Legs. Feet and Heels, and Under Medical Devices         Does the Patient have a Wound? No noted wound(s)       Valentin Prevention initiated by RN: Yes  Wound Care Orders initiated by RN: No    Pressure Injury (Stage 3,4, Unstageable, DTI, NWPT, and Complex wounds) if present, place Wound referral order by RN under : No    New Ostomies, if present place, Ostomy referral order under : No     Nurse 1 eSignature: Electronically signed by Tiburcio Gonzalez RN on 10/28/24 at 9:14 AM EDT    **SHARE this note so that the co-signing nurse can place an eSignature**    Nurse 2 eSignature: Electronically signed by Mariluz Felix RN on 10/28/24 at 2:52 PM EDT

## 2024-10-28 NOTE — PROGRESS NOTES
[facility]  Pulmonary, Critical Care, and Sleep Medicine    Name: Delfin Shetty MRN: [id number]   : 1955 Hospital: [facility]   Date: 10/28/2024        Pulmonary Medicine: Daily Progress Note    Admission Date:   10/23/2024  LOS: 5  MAR reviewed and pertinent medications noted or modified as needed    IMPRESSION:   Large L pleural effusion with loculations and tension contralateral mediastinal shift. Concern for mesothelioma given appearance of pleura or metastatic lung cancer given mass like consolidation L apex. Cannot R/o pneumonia ?post obstructive with leukocytosis and resulting parapneumonic effusion  s/p chest tube 10/24- exudative fluid- lymphocytic and eosinophilic.   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, not in exacerbation  Unintended weight loss concerning for malignancy  Former smoker  Hypertension   Dyslipidemia   Hyponatremia, mild  Anemia, normocytic, normochromic  Thrombocytosis  Peripheral Eosinophilia, likely secondary to malignancy       Patient Active Problem List   Diagnosis    Severe sepsis (HCC)    Primary hypertension    HLD (hyperlipidemia)    Acute respiratory failure with hypoxia    Hypokalemia    Eosinophilia    Thrombocytosis    Bullous emphysema (HCC)    Empyema lung (HCC)    Large pleural effusion    Pneumonia of left lung due to infectious organism    Acute respiratory failure with hypoxemia    Sepsis due to pneumonia (HCC)          RECOMMENDATIONS:     PULM:  Continue intrapleural  view(s)    FINDINGS:   Left chest tube appears unchanged in position. Increasing subcutaneous emphysema  along the left lateral chest wall. Tiny loculated pneumothorax at the left  costophrenic angle. Decrease in left pleural effusion. Unchanged interstitial  opacities in the left hemithorax. Right lung grossly clear.  Impression: Left chest tube remains in place with increasing subcutaneous emphysema along  the left lateral chest wall and tiny loculated pneumothorax at the left  costophrenic angle. Decrease in size of left pleural effusion. Consolidation in  the left lung unchanged.    Electronically signed by Jaden Gill          CXR 10/28 vs 10/25 vs 10/23         Care Time:  The services I provided to this patient were to treat and/or prevent clinically significant deterioration that could result in the failure of one or more body systems and/or organ systems due to respiratory distress, hypoxia, cardiac dysrhythmia.     Services included the following:  -reviewing nursing notes and old charts  -vital sign assessments   -direct patient care  -medication orders and management  -interpreting and reviewing diagnostic studies/labs  -re-evaluations  -documentation time        Aggregate care time was 15  minutes, which includes only time during which I was engaged in work directly related to the patient's care as described above.    During this entire length of time I was immediately available to the patient. The reason for providing this level of medical care for this critically ill patient was due a critical illness that impaired one or more vital organ systems such that there was a high probability of imminent or life threatening deterioration in the patients condition. This care involved high complexity decision making to assess, manipulate, and support vital system functions, to treat this degreee vital organ system failure and to prevent further life threatening deterioration of the patient’s

## 2024-10-28 NOTE — PROGRESS NOTES
Interventional Radiology Progress Note    Patient: Delfin Shetty               Sex: male          DOA: 10/23/2024       YOB: 1955      Age:  68 y.o.        LOS: 5 days       Assessment/Plan     Patient is POD#4 s/p left chest tube placement and left pleural mass biopsy by Dr. Gramajo.     1.  Continue management per primary team and chest tube management per pulmonary  2. Keep chest tube dressing clean and dry. Change as needed. Continue to monitor output    From an IR standpoint, the patient is progressing appropriately without any apparent complications.     IR to sign off but available as needed    Subjective:     The patient endorses mild soreness along chest tube. States it has improved since placement. Reports he has been tolerating intrapleural lytic therapy.     The patient denies N/V, HA, dizziness, fever, chills, abdominal pain, or chest pain.    Objective:      Visit Vitals  BP (!) 118/58   Pulse (!) 117   Temp 98.7 °F (37.1 °C) (Oral)   Resp 22   Ht 1.702 m (5' 7\")   Wt 65.3 kg (144 lb)   SpO2 95%   BMI 22.55 kg/m²     Recent images:  Procedure images are available for review in PACS    Interval history:  Cytology negative for malignancy  Cultures no growth  Receiving intrapleural lytic therapy since 10/26  210 ml output from left chest tube documented in last 24 hours    Physical Exam:  Constitutional: NAD. A&Ox4.  Respiratory: 3 L O2, Normal respiratory effort. Symmetrical rise and fall of chest.    Cardiovascular: tachycardic   Gastrointestinal: Soft, NT, ND.  Procedure site: CDI dressing left chest, chest tube in place -20 suction with 1590 ml in atrium    Thank you,  TR Hernández  1931

## 2024-10-28 NOTE — PROCEDURES
PROCEDURE NOTE  Date: 10/28/2024   Name: Delfin Shetty  YOB: 1955    Procedures    [facility]  Pulmonary, Critical Care, and Sleep Medicine  TPA/Dornase instillation via Chest Tube Procedure     Indication/ pre procedure diagnosis: empyema   Post procedure diagnosis: same     Chest tube clamped. Aseptic technique was observed. Dornase hugh 5 mg/30ml followed by Alteplase 10 mg/30ml instilled into LEFT pleural cavity via self sealing chest tube.   Instructions given to nursing to reposition pt q 15 minutes x4, and unclamp tube and place back on wall suction at 20mmhg after 1 hour at 0300  Pt tolerated procedure well.     Tika Alonso PA-C  10/28/24  Pulmonary, Critical Care Medicine  [facility]

## 2024-10-28 NOTE — PROGRESS NOTES
0200: TR Alonso administered Dornase and Alteplase through patient's chest tube. Chest tube dressing was changed, chest tube clamped for 1 hour per TR Villela. Patient exhibited no signs of distress, given morphine for pain at 0130. Patient currently feeling fine, vitals stable.     0300: Chest tube unclamped

## 2024-10-28 NOTE — PROGRESS NOTES
[facility]  Progress Note    Patient: Delfin Shetty Age: 68 y.o. : 1955 MR#: [id number] SSN: [id number]  Date/Time: 10/28/2024    Subjective:   Subjective   No acute events overnight, no new concerns or complaints, vitals stable.  Ongoing lytic therapy per pulmonology    Review of Systems   Constitutional: Negative for fever.      Assessment/Plan:   Severe sepsis  Large pleural effusion, not likely infected  Eosinophilia  Severe atelectasis secondary to pleural effusion  Developing tension hydrothorax  Acute hypoxic respiratory failure  Possible lung mass, concern mesothelioma  Suspected COPD with bullous emphysema  Hypokalemia  Cachexia  Thrombocytosis  Hypertension  Hyperlipidemia  Tobacco abuse  Eosinophilia    Plan  Improved output from chest tube.    Infectious disease on board given initial presentation and concern for sepsis as a possible cause.  Continue Zosyn for time being given possibility of postobstructive pneumonia.      Follow-up pleural cultures/biopsies     Wean oxygen as tolerated    Disposition: Expected location: Home     Expected discharge date: 2024      Case discussed with:  [x]Patient  []Family  [x]Nursing  [x]Case Management  DVT Prophylaxis:  [x]Lovenox  []Hep SQ  [x]SCDs  []Coumadin   []On Heparin gtt   [] DOAC    Objective:   Objective:  General Appearance:  In no acute distress, not in pain, uncomfortable and ill-appearing.    Vital signs: (most recent): Blood pressure (!) 133/39, pulse (!) 118, temperature 98.3 °F (36.8 °C), temperature source Oral, resp. rate 23, height 1.702 m (5' 7\"), weight 65.3 kg (144 lb), SpO2 93%.  No fever.    HEENT: Normal HEENT exam.    Lungs:  Normal respiratory rate and increased effort.  There are decreased breath sounds and rhonchi.    Heart: Tachycardia.  Regular rhythm.  S1 normal and S2 normal.  No murmur, gallop or friction rub.   Chest: Symmetric chest wall expansion.   Abdomen: Abdomen is

## 2024-10-28 NOTE — PROGRESS NOTES
Comprehensive Nutrition Assessment    Type and Reason for Visit:  Reassess    Nutrition Recommendations/Plan:   Continue current diet as tolerated.  Continue oral supplements: Ensure Plus High Protein (each provides 350 kcal, 20g protein) TID  Continue multivitamin supplementation daily, Daily wts.  Continue to monitor tolerance of PO, compliance of oral supplements, weight, labs, and plan of care during admission.     Malnutrition Assessment:  Malnutrition Status:  Severe malnutrition (10/28/24 1133)    Context:  Chronic Illness     Findings of the 6 clinical characteristics of malnutrition:  Energy Intake:  75% or less estimated energy requirements for 1 month or longer  Weight Loss:  Greater than 7.5% over 3 months     Body Fat Loss:  Severe body fat loss Triceps, Buccal region   Muscle Mass Loss:  Severe muscle mass loss Temples (temporalis), Clavicles (pectoralis & deltoids), Thigh (quadriceps), Scapula (trapezius), Hand (interosseous), Calf (gastrocnemius)  Fluid Accumulation:  No significant fluid accumulation     Strength:  Not Performed    Food/Nutrition Related History:  PTA: pt reports not eating well for 2-3 months due to early satiety with reported 30 lb unintentional wt loss x 3 months. Pt reports UBW of 175 lbs.     Nutrition Assessment:    Admitted for c/o SOB, cough and chest pain. Pt seen for - follow up (last RD visit 10/24); awake and oriented. Pt reports appetite is increasing since admission and endorses consuming % of meals. Pt reports liking/drinking ONS continue to send. Bed weight obtained 64.6 kg (142 lbs). NFPE completed, see details above. Pt had no nutrition related questions/concerns at this time. Will continue to monitor per protocol.    Meal Intake: Provides on average ~95% kcal, 100% protein of estimated needs  Patient Vitals for the past 168 hrs:   PO Meals Eaten (%)   10/26/24 1840 76 - 100%   10/26/24 1230 76 - 100%   10/26/24 0800 76 - 100%   10/25/24 1208 76 - 100%    10/25/24 0822 76 - 100%   10/24/24 1800 76 - 100%       Nutrition Related Findings:    Pertinent Meds:   Lipitor  Lovenox  MVI   Pertinent Labs:  Recent Labs     10/27/24  0515 10/28/24  0530   GLUCOSE 91 116*   BUN 10 12   CREATININE 0.64 0.70    134*   K 3.9 4.3    101   CO2 25 27   CALCIUM 9.0 9.0     No results for input(s): \"POCGLU\" in the last 72 hours.    Last BM: 10/26/24    Skin: Wound Type: None (Chest tube)     Edema: None      Current Nutrition Intake & Therapies:    Average Meal Intake: %  Average Supplements Intake: None Ordered  ADULT DIET; Regular  ADULT ORAL NUTRITION SUPPLEMENT; Breakfast, Lunch, Dinner; Standard High Calorie/High Protein Oral Supplement    Anthropometric Measures:  Height: 170.2 cm (5' 7\")  Ideal Body Weight (IBW): 148 lbs (67 kg)    Admission Body Weight: 58.5 kg (128 lb 15.5 oz)  Current Body Weight: 58.5 kg (128 lb 15.5 oz), 87.1 % IBW.    Current BMI (kg/m2): 20.2  Usual Body Weight:  (BECKIE)    BMI Categories: Underweight (BMI less than 22) age over 65    Estimated Daily Nutrient Needs:  Energy Requirements Based On: Kcal/kg  Weight Used for Energy Requirements: Admission  Energy (kcal/day): 1807-5319 (30-35)  Weight Used for Protein Requirements: Admission  Protein (g/day): 59-88 (1-1.5)  Method Used for Fluid Requirements: 1 ml/kcal  Fluid (ml/day): 8282-1265    Nutrition Diagnosis:   Severe malnutrition, In context of chronic illness (possible mesothelioma/metastatic lung cancer) related to inadequate protein-energy intake as evidenced by Criteria as identified in malnutrition assessment    Underweight related to increase demand for energy/nutrients, inadequate protein-energy intake as evidenced by BMI, weight loss    Nutrition Interventions:   Food and/or Nutrient Delivery: Continue Current Diet, Continue Oral Nutrition Supplement, Vitamin Supplement  Nutrition Education/Counseling: No recommendation at this time  Coordination of Nutrition Care: Continue

## 2024-10-29 ENCOUNTER — APPOINTMENT (OUTPATIENT)
Facility: HOSPITAL | Age: 69
End: 2024-10-29
Payer: MEDICARE

## 2024-10-29 PROBLEM — C34.92 CARCINOMA OF LEFT LUNG (HCC): Status: ACTIVE | Noted: 2024-10-29

## 2024-10-29 LAB
ANION GAP SERPL CALC-SCNC: 7 MMOL/L (ref 3–18)
BACTERIA SPEC CULT: NORMAL
BACTERIA SPEC CULT: NORMAL
BUN SERPL-MCNC: 11 MG/DL (ref 7–18)
BUN/CREAT SERPL: 19 (ref 12–20)
CALCIUM SERPL-MCNC: 8.8 MG/DL (ref 8.5–10.1)
CHLORIDE SERPL-SCNC: 102 MMOL/L (ref 100–111)
CO2 SERPL-SCNC: 25 MMOL/L (ref 21–32)
CREAT SERPL-MCNC: 0.57 MG/DL (ref 0.6–1.3)
FERRITIN SERPL-MCNC: 807 NG/ML (ref 8–388)
FOLATE SERPL-MCNC: 5.2 NG/ML (ref 3.1–17.5)
GLUCOSE SERPL-MCNC: 105 MG/DL (ref 74–99)
IRON SATN MFR SERPL: 20 % (ref 20–50)
IRON SERPL-MCNC: 21 UG/DL (ref 50–175)
POTASSIUM SERPL-SCNC: 3.6 MMOL/L (ref 3.5–5.5)
SERVICE CMNT-IMP: NORMAL
SERVICE CMNT-IMP: NORMAL
SODIUM SERPL-SCNC: 134 MMOL/L (ref 136–145)
TIBC SERPL-MCNC: 106 UG/DL (ref 250–450)
VIT B12 SERPL-MCNC: 594 PG/ML (ref 211–911)

## 2024-10-29 PROCEDURE — 94669 MECHANICAL CHEST WALL OSCILL: CPT

## 2024-10-29 PROCEDURE — 0W9B3ZZ DRAINAGE OF LEFT PLEURAL CAVITY, PERCUTANEOUS APPROACH: ICD-10-PCS | Performed by: STUDENT IN AN ORGANIZED HEALTH CARE EDUCATION/TRAINING PROGRAM

## 2024-10-29 PROCEDURE — 6370000000 HC RX 637 (ALT 250 FOR IP): Performed by: STUDENT IN AN ORGANIZED HEALTH CARE EDUCATION/TRAINING PROGRAM

## 2024-10-29 PROCEDURE — 82728 ASSAY OF FERRITIN: CPT

## 2024-10-29 PROCEDURE — 71260 CT THORAX DX C+: CPT

## 2024-10-29 PROCEDURE — 83550 IRON BINDING TEST: CPT

## 2024-10-29 PROCEDURE — 2700000000 HC OXYGEN THERAPY PER DAY

## 2024-10-29 PROCEDURE — 6360000002 HC RX W HCPCS: Performed by: STUDENT IN AN ORGANIZED HEALTH CARE EDUCATION/TRAINING PROGRAM

## 2024-10-29 PROCEDURE — 6360000002 HC RX W HCPCS: Performed by: INTERNAL MEDICINE

## 2024-10-29 PROCEDURE — 70553 MRI BRAIN STEM W/O & W/DYE: CPT

## 2024-10-29 PROCEDURE — 6360000002 HC RX W HCPCS: Performed by: PHYSICIAN ASSISTANT

## 2024-10-29 PROCEDURE — 36415 COLL VENOUS BLD VENIPUNCTURE: CPT

## 2024-10-29 PROCEDURE — 80048 BASIC METABOLIC PNL TOTAL CA: CPT

## 2024-10-29 PROCEDURE — 94640 AIRWAY INHALATION TREATMENT: CPT

## 2024-10-29 PROCEDURE — APPSS15 APP SPLIT SHARED TIME 0-15 MINUTES

## 2024-10-29 PROCEDURE — 2580000003 HC RX 258: Performed by: INTERNAL MEDICINE

## 2024-10-29 PROCEDURE — 99233 SBSQ HOSP IP/OBS HIGH 50: CPT | Performed by: STUDENT IN AN ORGANIZED HEALTH CARE EDUCATION/TRAINING PROGRAM

## 2024-10-29 PROCEDURE — 6370000000 HC RX 637 (ALT 250 FOR IP): Performed by: PHYSICIAN ASSISTANT

## 2024-10-29 PROCEDURE — 6360000004 HC RX CONTRAST MEDICATION: Performed by: INTERNAL MEDICINE

## 2024-10-29 PROCEDURE — 1100000003 HC PRIVATE W/ TELEMETRY

## 2024-10-29 PROCEDURE — 82746 ASSAY OF FOLIC ACID SERUM: CPT

## 2024-10-29 PROCEDURE — 6360000002 HC RX W HCPCS: Performed by: HEALTH CARE PROVIDER

## 2024-10-29 PROCEDURE — 2580000003 HC RX 258: Performed by: PHYSICIAN ASSISTANT

## 2024-10-29 PROCEDURE — 94761 N-INVAS EAR/PLS OXIMETRY MLT: CPT

## 2024-10-29 PROCEDURE — 32562 LYSE CHEST FIBRIN SUBQ DAY: CPT | Performed by: PHYSICIAN ASSISTANT

## 2024-10-29 PROCEDURE — 71045 X-RAY EXAM CHEST 1 VIEW: CPT

## 2024-10-29 PROCEDURE — 97116 GAIT TRAINING THERAPY: CPT

## 2024-10-29 PROCEDURE — 99232 SBSQ HOSP IP/OBS MODERATE 35: CPT | Performed by: INTERNAL MEDICINE

## 2024-10-29 PROCEDURE — 82607 VITAMIN B-12: CPT

## 2024-10-29 PROCEDURE — A9577 INJ MULTIHANCE: HCPCS | Performed by: INTERNAL MEDICINE

## 2024-10-29 PROCEDURE — 83540 ASSAY OF IRON: CPT

## 2024-10-29 RX ORDER — DIATRIZOATE MEGLUMINE AND DIATRIZOATE SODIUM 660; 100 MG/ML; MG/ML
30 SOLUTION ORAL; RECTAL
Status: DISCONTINUED | OUTPATIENT
Start: 2024-10-29 | End: 2024-11-13

## 2024-10-29 RX ORDER — IOPAMIDOL 612 MG/ML
100 INJECTION, SOLUTION INTRAVASCULAR
Status: COMPLETED | OUTPATIENT
Start: 2024-10-29 | End: 2024-10-29

## 2024-10-29 RX ADMIN — PIPERACILLIN AND TAZOBACTAM 3375 MG: 3; .375 INJECTION, POWDER, LYOPHILIZED, FOR SOLUTION INTRAVENOUS at 21:12

## 2024-10-29 RX ADMIN — WATER 5 MG: 1 INJECTION INTRAMUSCULAR; INTRAVENOUS; SUBCUTANEOUS at 01:53

## 2024-10-29 RX ADMIN — THERA TABS 1 TABLET: TAB at 09:17

## 2024-10-29 RX ADMIN — SODIUM CHLORIDE, PRESERVATIVE FREE 10 ML: 5 INJECTION INTRAVENOUS at 09:18

## 2024-10-29 RX ADMIN — IPRATROPIUM BROMIDE 0.5 MG: 0.5 SOLUTION RESPIRATORY (INHALATION) at 08:53

## 2024-10-29 RX ADMIN — MORPHINE SULFATE 4 MG: 4 INJECTION, SOLUTION INTRAMUSCULAR; INTRAVENOUS at 23:15

## 2024-10-29 RX ADMIN — DIATRIZOATE MEGLUMINE AND DIATRIZOATE SODIUM 30 ML: 600; 100 SOLUTION ORAL; RECTAL at 13:34

## 2024-10-29 RX ADMIN — MORPHINE SULFATE 4 MG: 4 INJECTION, SOLUTION INTRAMUSCULAR; INTRAVENOUS at 04:45

## 2024-10-29 RX ADMIN — IPRATROPIUM BROMIDE 0.5 MG: 0.5 SOLUTION RESPIRATORY (INHALATION) at 21:52

## 2024-10-29 RX ADMIN — BUDESONIDE 1 MG: 1 SUSPENSION RESPIRATORY (INHALATION) at 21:52

## 2024-10-29 RX ADMIN — BUDESONIDE 1 MG: 1 SUSPENSION RESPIRATORY (INHALATION) at 08:53

## 2024-10-29 RX ADMIN — PIPERACILLIN AND TAZOBACTAM 3375 MG: 3; .375 INJECTION, POWDER, LYOPHILIZED, FOR SOLUTION INTRAVENOUS at 05:05

## 2024-10-29 RX ADMIN — SODIUM CHLORIDE, PRESERVATIVE FREE 10 ML: 5 INJECTION INTRAVENOUS at 21:09

## 2024-10-29 RX ADMIN — ENOXAPARIN SODIUM 40 MG: 100 INJECTION SUBCUTANEOUS at 09:17

## 2024-10-29 RX ADMIN — GADOBENATE DIMEGLUMINE 12 ML: 529 INJECTION, SOLUTION INTRAVENOUS at 19:41

## 2024-10-29 RX ADMIN — ATORVASTATIN CALCIUM 10 MG: 10 TABLET, FILM COATED ORAL at 21:09

## 2024-10-29 RX ADMIN — ARFORMOTEROL TARTRATE 15 MCG: 15 SOLUTION RESPIRATORY (INHALATION) at 21:52

## 2024-10-29 RX ADMIN — PIPERACILLIN AND TAZOBACTAM 3375 MG: 3; .375 INJECTION, POWDER, LYOPHILIZED, FOR SOLUTION INTRAVENOUS at 13:39

## 2024-10-29 RX ADMIN — IOPAMIDOL 90 ML: 612 INJECTION, SOLUTION INTRAVENOUS at 16:04

## 2024-10-29 RX ADMIN — ALTEPLASE 10 MG: 2.2 INJECTION, POWDER, LYOPHILIZED, FOR SOLUTION INTRAVENOUS at 01:52

## 2024-10-29 RX ADMIN — ARFORMOTEROL TARTRATE 15 MCG: 15 SOLUTION RESPIRATORY (INHALATION) at 08:53

## 2024-10-29 ASSESSMENT — PAIN SCALES - GENERAL
PAINLEVEL_OUTOF10: 0
PAINLEVEL_OUTOF10: 8
PAINLEVEL_OUTOF10: 0
PAINLEVEL_OUTOF10: 8

## 2024-10-29 ASSESSMENT — PAIN DESCRIPTION - LOCATION
LOCATION: FLANK
LOCATION: FLANK

## 2024-10-29 ASSESSMENT — PAIN DESCRIPTION - DESCRIPTORS
DESCRIPTORS: DISCOMFORT;ACHING
DESCRIPTORS: ACHING

## 2024-10-29 ASSESSMENT — PAIN DESCRIPTION - FREQUENCY: FREQUENCY: INTERMITTENT

## 2024-10-29 ASSESSMENT — PAIN DESCRIPTION - PAIN TYPE: TYPE: SURGICAL PAIN

## 2024-10-29 ASSESSMENT — PAIN - FUNCTIONAL ASSESSMENT: PAIN_FUNCTIONAL_ASSESSMENT: PREVENTS OR INTERFERES SOME ACTIVE ACTIVITIES AND ADLS

## 2024-10-29 ASSESSMENT — PAIN DESCRIPTION - ORIENTATION
ORIENTATION: LEFT
ORIENTATION: LEFT

## 2024-10-29 ASSESSMENT — PAIN DESCRIPTION - ONSET: ONSET: PROGRESSIVE

## 2024-10-29 NOTE — PROGRESS NOTES
MRI screening form needs to be filled out and faxed to  [phone] BEFORE MRI can be scheduled.  If unable to obtain information from patient , MPOA needs to be contacted .   If patient is claustrophobic or will needs pain meds, please have ordered in advance in order to facilitate exam.      If POA is unavailable or unsure of patient history, screening X-rays will need to be ordered.

## 2024-10-29 NOTE — PROGRESS NOTES
4 Eyes Skin Assessment     NAME:  Delfin Shetty  YOB: 1955  MEDICAL RECORD NUMBER:  [id number]    The patient is being assessed for  Shift Handoff    I agree that at least one RN has performed a thorough Head to Toe Skin Assessment on the patient. ALL assessment sites listed below have been assessed.      Areas assessed by both nurses:    Head, Face, Ears, Shoulders, Back, Chest, Arms, Elbows, Hands, Sacrum. Buttock, Coccyx, Ischium, Legs. Feet and Heels, and Under Medical Devices         Does the Patient have a Wound? Yes wound(s) were present on assessment. LDA wound assessment was Initiated and completed by RN  Chest tube L Flank site       Valentin Prevention initiated by RN: Yes  Wound Care Orders initiated by RN: No    Pressure Injury (Stage 3,4, Unstageable, DTI, NWPT, and Complex wounds) if present, place Wound referral order by RN under : No    New Ostomies, if present place, Ostomy referral order under : No     Nurse 1 eSignature: Electronically signed by BEL PALOMINO RN on 10/29/24 at 7:01 AM EDT    **SHARE this note so that the co-signing nurse can place an eSignature**    Nurse 2 eSignature: Electronically signed by Mariluz Felix RN on 10/29/24 at 7:50 AM EDT

## 2024-10-29 NOTE — PROGRESS NOTES
[facility]  Progress Note    Patient: Delfin Shetty Age: 68 y.o. : 1955 MR#: [id number] SSN: [id number]  Date/Time: 10/29/2024    Subjective:   Subjective   No acute events overnight, no new concerns or complaints, vitals stable.  Lytic therapy complete    Review of Systems   Constitutional: Negative for fever.      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  Currently known pleural spread, concern of possible other organs involved, ongoing scans at this time, IR and oncology have been consulted and have given some recommendations.  Hopefully patient will be able to have Mediport placed within the next day or so and scans done to determine the extent of disease.    Wean oxygen as tolerated    Concern regarding where patient will ultimately go to.  Have spoken with patient's friend who states that patient currently lives in a shed.  When asked to clarify if this was a furnished shed or more along the lines of a shed that is used to store tools, patient and friend agree that it is a shed that is similar to store tools.  Given patient may require immunotherapy chemotherapy or other such therapies in the near future, discharged to such a living condition would be essentially equivalent to discharge to homelessness and on the street.  Will  Ref Range    Ferritin 807 (H) 8 - 388 NG/ML   Iron and TIBC    Collection Time: 10/29/24  5:11 AM   Result Value Ref Range    Iron 21 (L) 50 - 175 ug/dL    TIBC 106 (L) 250 - 450 ug/dL    Iron % Saturation 20 20 - 50 %   Vitamin B12 & Folate    Collection Time: 10/29/24  5:11 AM   Result Value Ref Range    Vitamin B-12 594 211 - 911 pg/mL    Folate 5.2 3.10 - 17.50 ng/mL          Signed By: Júnior Colon MD     October 29, 2024 5:53 PM

## 2024-10-29 NOTE — PROGRESS NOTES
Physician Progress Note      PATIENT:               MIGUEL GOODMAN  CSN #:                  [id number]  :                       1955  ADMIT DATE:       10/23/2024 1:03 PM  DISCH DATE:  RESPONDING  PROVIDER #:        Júnior Colon MD          QUERY TEXT:    Patient admitted. Noted to have Severe malnutrition in Registered Dietitian PN   on 10/28/24. If possible, please document in progress notes and discharge   summary if you are evaluating and /or treating any of the following:    The medical record reflects the following:    Risk Factors:  pt reports not eating well for 2-3 months due to early satiety with reported   30 lb unintentional wt loss x 3 months. Pt reports UBW of 175 lbs.    Clinical Indicators:  Malnutrition Assessment:  Malnutrition Status:  Severe malnutrition (10/28/24 1133)  Context:  Chronic Illness  Findings of the 6 clinical characteristics of malnutrition:  Energy Intake:  75% or less estimated energy requirements for 1 month or   longer  Weight Loss:  Greater than 7.5% over 3 months  Body Fat Loss:  Severe body fat loss Triceps, Buccal region  Muscle Mass Loss:  Severe muscle mass loss Temples (temporalis), Clavicles   (pectoralis & deltoids), Thigh (quadriceps), Scapula (trapezius), Hand   (interosseous), Calf (gastrocnemius)  Fluid Accumulation:  No significant fluid accumulation    Treatment:  Nutrition Recommendations/Plan:  1. Continue current diet as tolerated.  2. Continue oral supplements: Ensure Plus High Protein (each provides 350   kcal, 20g protein) TID  3. Continue multivitamin supplementation daily, Daily wts.  4. Continue to monitor tolerance of PO, compliance of oral supplements,   weight, labs, and plan of care during admission.        ASPEN Criteria:    https://aspenjournals.onlinelibrary.siddiqui.com/doi/full/10.1177/865521236229983  5  Options provided:  -- Protein calorie malnutrition severe  -- Other - I will add my own diagnosis  -- Disagree - Not applicable

## 2024-10-29 NOTE — PROCEDURES
PROCEDURE NOTE  Date: 10/29/2024   Name: Delfin Shetty  YOB: 1955    Procedures  [facility]  Pulmonary, Critical Care, and Sleep Medicine  TPA/Dornase instillation via Chest Tube Procedure     Indication/ pre procedure diagnosis: empyema   Post procedure diagnosis: same     Chest tube clamped. Aseptic technique was observed. Dornase hugh 5 mg/30ml followed by Alteplase 10 mg/30ml instilled into RIGHT/LEFT pleural cavity via self sealing chest tube.   Instructions given to nursing to reposition pt q 15 minutes x4, and unclamp tube and place back on wall suction at 20mmhg after 1 hour at 02:51  Pt tolerated procedure well.     Tika Alonso PA-C  10/29/24  Pulmonary, Critical Care Medicine  [facility]

## 2024-10-29 NOTE — PROGRESS NOTES
Infectious Disease Progress Note        Reason: Evaluate for severe sepsis/empyema    Current abx Prior abx   Pip/tazo since 10/23/24 Vancomycin 10/23-10/24     Lines:       Assessment :   68 y.o. male with a PMHx of tobacco abuse, HTN, HLD who presented to the ED on 10/23/24 with c/o SOB associated with cough and chest pain ongoing for the past 2 weeks.    I agree that initial clinical presentation is concerning for sepsis/empyema.  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    Status post thoracentesis, chest tube insertion on 10/24/2024.  No neutrophils seen on pleural fluid argues against empyema.  Pleural fluid cultures 10/24-no organisms  Biopsy positive for squamous cell carcinoma      Recommendations:    D/c piperacillin/tazobactam after tomorrow's dose  Management of chest tube per pulmonary/IR  Follow up oncology recommendations regarding lung cancer  Monitor clinically    Above plan was discussed in details with patient, primary team. Please call me if any further questions or concerns. Will continue to participate in the care of this patient.    HPI:     Feels better.  Improved left chest discomfort. he denies fever, chills, abd pain, nvd, leg swelling.    Past Medical History:   Diagnosis Date    HLD (hyperlipidemia) 10/23/2024    Primary hypertension 10/23/2024       Past Surgical History:   Procedure Laterality Date    CT BIOPSY SOFT TISSUE NECK  10/24/2024    CT BIOPSY LUNG/MEDIASTINUM PERC 10/24/2024 Oc Gramajo MD [facility] RAD CT       @[email or web address]    Current Facility-Administered Medications   Medication Dose Route Frequency    ipratropium (ATROVENT) 0.02 % nebulizer solution 0.5 mg  0.5 mg Nebulization TID RT    ALTEplase (CATHFLO) 10 mg in sodium chloride 0.9 % 30 mL  10 mg IntraPLEUral Q12H

## 2024-10-29 NOTE — PROGRESS NOTES
Requested MRI safety screening form from floor last night  , will follow up after screening form is received.

## 2024-10-29 NOTE — PLAN OF CARE
Problem: Physical Therapy - Adult  Goal: By Discharge: Performs mobility at highest level of function for planned discharge setting.  See evaluation for individualized goals.  Description: Initiated  10/25/24  to be met within 7-10 days.    1.  Patient will move from supine to sit and sit to supine , scoot up and down, and roll side to side in bed with modified independence.    2.  Patient will transfer from bed to chair and chair to bed with modified independence using the least restrictive device.  3.  Patient will perform sit to stand with modified independence.  4.  Patient will ambulate with modified independence for 150 feet with the least restrictive device.   5.  Patient will ascend/descend 2 stairs with b/l handrail(s) with modified independence.    PLOF: Pt lives alone in a single story home, 2 [address] with handrails. Pt was independent with all mobility, no AD.    Outcome: Progressing   PHYSICAL THERAPY TREATMENT    Patient: Delfin Shetty (68 y.o. male)  Date: 10/29/2024  Diagnosis: Hypokalemia [E87.6]  Empyema lung (HCC) [J86.9]  Bullous emphysema (HCC) [J43.9]  Acute respiratory failure with hypoxemia [J96.01]  Sepsis due to pneumonia (HCC) [J18.9, A41.9]  Pneumonia of left lung due to infectious organism, unspecified part of lung [J18.9]  Large pleural effusion [J90] Severe sepsis (HCC)  Precautions: Fall Risk, General Precautions  ASSESSMENT:  Seated in bed. Reports transfers self to bedside commode and recliner throughout admission. Educated on activity pacing and safety with lines. Encouraged amb in room with staff supervision for lines. Mod I for supine to sit. Seated EOB with good balance. Left side chest tube to suction; unable to be disconnected per RN. Cardiac monitor and 2L O2. HR elevated; RN aware. 126 bpm at rest, 145 bpm with activity, returned to 129 bpm with rest. O2 saturation 91% at rest, 85% with amb, 90% at rest; on 2L O2. Amb in room with supervision. Narrow base of support. Two

## 2024-10-29 NOTE — PROGRESS NOTES
Bedside and Verbal shift change report given to ASHLEY Mcgraw (oncoming nurse) by ASHLEY Sage (offgoing nurse). Report included the following information Nurse Handoff Report, Index, Adult Overview, Intake/Output, MAR, Cardiac Rhythm Sinus Tachy, Neuro Assessment, and Event Log.        1125: Call from CT. Spoke with their technician Kiara. RN advised to give Gastrografin after the patient complete's their lunch. CT will then be contacted at [phone], so they can put in transport an hour after medication ingestion.

## 2024-10-29 NOTE — PROGRESS NOTES
1640: Patient's heart rate sustaining in the 130's. Currently at 131. Tele department also called to notify nurse. This nurse rounded on patient. Patient's resting securely in bed with no signs of distress. Family at bedside. Will page MD.     1645: Spoke with MD in regard to patient's heart rate sustaining in the 130's. MD will round on patient momentarily.

## 2024-10-29 NOTE — PROGRESS NOTES
Requested MRI safety screening form from floor , will follow up after screening form is received.

## 2024-10-29 NOTE — CONSULTS
Hematology / Oncology Progress Note      Patient: Delfin Shetty  Gender: male   MRN: [id number]    YOB: 1955 Age: 68 y.o.   CSN: [id number]     LOS: 6 days               Admit Date: 10/23/2024    PCP: Jenifer Luz APRN - NP    Date of evaluation: 10/29/2024     Assessment:   1.  Large left malignant pleural effusion secondary to poorly differentiated carcinoma [squamous cell lung carcinoma] status post thoracentesis with pleural biopsy and left chest tube placement on 10/24/2024  2.  Left lateral apex masslike consolidation likely primary lesion  3.  Acute hypoxic respiratory failure secondary to COPD, large left pleural effusion and newly diagnosed lung cancer  4.  5 pound unintentional weight loss secondary to malignancy  6.  Neutrophilic leukocytosis, thrombocytosis and eosinophilia secondary to malignancy  7.  Anemia, normocytic, normochromic most likely secondary to malignancy    Pertinent imaging:  10/23/2024 CT chest PE protocol showed no evidence of pulmonary embolus or right heart strain.     There is a huge relatively loculated subpulmonic pleural effusion in the lower  chest depressing an inverting the left hemidiaphragm and displace the abdominal  contents. There is complete atelectasis of the left lower lobe and much of the  lingula. There is a combination of atelectasis and peripheral consolidation  involving the left upper lobe with additional peripheral somewhat loculated  fluid. There is a somewhat masslike consolidation in the lateral left apex which  is somewhat for possible mass and should be monitored on follow-up.     Diffuse emphysematous and some bullous changes are seen in the right lung.     The mediastinal contents have been shifted completely into the right chest.  There is no definite evidence of mediastinal adenopathy but the left hilum is  distorted and could obscure some lymph nodes.      Plan:   -Results of left pleural biopsy showing poorly

## 2024-10-29 NOTE — PLAN OF CARE
Problem: Discharge Planning  Goal: Discharge to home or other facility with appropriate resources  Outcome: Progressing  Flowsheets (Taken 10/28/2024 1952)  Discharge to home or other facility with appropriate resources:   Identify barriers to discharge with patient and caregiver   Arrange for needed discharge resources and transportation as appropriate  Note: Educated patient on barriers to discharge: Chest tube management, IV antibiotics, and monitoring labs.     Problem: Pain  Goal: Verbalizes/displays adequate comfort level or baseline comfort level  Outcome: Progressing  Flowsheets (Taken 10/28/2024 1936)  Verbalizes/displays adequate comfort level or baseline comfort level:   Encourage patient to monitor pain and request assistance   Assess pain using appropriate pain scale  Note: Encouraged patient to monitor pain on appropriate pain scale and to inform staff of any. Patient has not endorsed any pain so far but does state pain usually increases with movement.      Problem: Safety - Adult  Goal: Free from fall injury  Outcome: Progressing  Note: Educated patient on importance of wearing non-skid yellow socks, using call light to ask for assistance, and safe exit side of the bed when applicable. Informed patient of monitoring chest tube line to ensure it is not kinked.     Problem: Skin/Tissue Integrity  Goal: Absence of new skin breakdown  Description: 1.  Monitor for areas of redness and/or skin breakdown  2.  Assess vascular access sites hourly  3.  Every 4-6 hours minimum:  Change oxygen saturation probe site  4.  Every 4-6 hours:  If on nasal continuous positive airway pressure, respiratory therapy assess nares and determine need for appliance change or resting period.  Outcome: Progressing  Note: Educated patient on repositioning frequently to avoid any pressure injuries and monitoring chest tube site. Encouraged patient to float heels to avoid any redness developing.

## 2024-10-29 NOTE — PLAN OF CARE
Problem: Discharge Planning  Goal: Discharge to home or other facility with appropriate resources  10/29/2024 1102 by Mariluz Felix RN  Outcome: Progressing  Note: Patient educated on their discharge barriers including their chest tube management, IV antibiotics, and monitoring labs.     Problem: Pain  Goal: Verbalizes/displays adequate comfort level or baseline comfort level  Description: Patient's pain will be assessed and reassessed at least 3 times per shift.  10/29/2024 1102 by Mariluz Felix RN  Outcome: Progressing  Flowsheets (Taken 10/29/2024 0445 by Alona Suarez, RN)  Verbalizes/displays adequate comfort level or baseline comfort level:   Encourage patient to monitor pain and request assistance   Assess pain using appropriate pain scale  Note: Patient encouraged to monitor pain on appropriate the pain scale of 0-10 and to inform unit's staff of the occurrence of any.     Problem: Safety - Adult  Goal: Free from fall injury  10/29/2024 1102 by Mariluz Felix RN  Outcome: Progressing  Note: Patient educated on the importance of the appropriate safety precautions during their stay, including wearing non-skid yellow socks, using their call light to ask for assistance, having a safe exit on the side of the bed when applicable and monitoring chest tube line to ensure it is not kinked or at risk of being tipped over.     Problem: Skin/Tissue Integrity  Goal: Absence of new skin breakdown  Description: 1.  Monitor for areas of redness and/or skin breakdown  2.  Assess vascular access sites hourly  3.  Every 4-6 hours minimum:  Change oxygen saturation probe site  4.  Every 4-6 hours:  If on nasal continuous positive airway pressure, respiratory therapy assess nares and determine need for appliance change or resting period.  10/29/2024 1102 by Mariluz Felix RN  Outcome: Progressing  Note: Patient educated on the importance of repositioning frequently to avoid the formation of any pressure injuries. As well as

## 2024-10-29 NOTE — PROGRESS NOTES
[facility]  Pulmonary, Critical Care, and Sleep Medicine    Name: Delfin Shetty MRN: [id number]   : 1955 Hospital: [facility]   Date: 10/29/2024        Pulmonary Medicine: Daily Progress Note    Admission Date:   10/23/2024  LOS: 6  MAR reviewed and pertinent medications noted or modified as needed    IMPRESSION:   Large L malignant pleural effusion with loculations and tension contralateral mediastinal shift. Concern for mesothelioma given appearance of pleura or metastatic lung cancer given mass like consolidation L apex. Cannot R/o pneumonia ?post obstructive with leukocytosis and resulting parapneumonic effusion  s/p chest tube 10/24- exudative fluid- lymphocytic and eosinophilic.   Not likely infected, cultures with no growth since 10/24, pH>7.2, Glucose >4, procal neg  Cytology negative for malignant cells  Pathology of left pleural positive for SCC  Flu and COVID PCR negative  Receiving intrapleural lytic therapy since 10/26.  POORLY DIFFERENTIATED CARCINOMA OF LEFT LUNG, FAVOR SQUAMOUS CELL CARCINOMA from left pleural biopsy of 10/24. Staining for PD-L1 expression pending  Mass-like consolidation in the lateral left apex, likely malignant  Acute hypoxic respiratory failure due to combination of passive atelectasis, consolidation, COPD. Improving with lung re-expansion and less tachypnea and less oxygen requirement  Atelectasis, compressive vs obstructive, improving  COPD with bullous emphysema on imaging, not in exacerbation  Unintended weight loss concerning for malignancy  Former smoker  Hypertension   Sinus tachycardia, likely physiologic  Dyslipidemia   Hyponatremia, mild  Anemia, normocytic, normochromic  Leukocytosis with neutrophilia, elevated monocytes and eosinophilia  Thrombocytosis  Peripheral Eosinophilia, likely secondary to malignancy       Patient Active Problem List   Diagnosis    Severe sepsis (HCC)    Primary hypertension    HLD    No family history on file.     Review of Systems:  Pertinent items are noted in HPI.  Review of Systems   Constitutional:  Negative for chills and fever.   HENT:  Negative for rhinorrhea and sore throat.    Respiratory:  Negative for cough and wheezing.    Cardiovascular:  Positive for chest pain (at chest tube site and with cough). Negative for palpitations.   Gastrointestinal:  Negative for abdominal pain, constipation, nausea and vomiting.   Genitourinary:  Negative for dysuria and hematuria.   Neurological:  Negative for dizziness and numbness.   Psychiatric/Behavioral:  Negative for agitation, confusion and sleep disturbance.          Objective:   Vital Signs:    BP (!) 128/56   Pulse (!) 110   Temp 98 °F (36.7 °C) (Oral)   Resp 22   Ht 1.702 m (5' 7\")   Wt 57.8 kg (127 lb 6.8 oz)   SpO2 96%   BMI 19.96 kg/m²         PreHospital Care  Analgesics Taken or Received PTA?: No   Temp (24hrs), Av.6 °F (37 °C), Min:97.9 °F (36.6 °C), Max:99.3 °F (37.4 °C)       Intake/Output:   Last shift:      No intake/output data recorded.  Last 3 shifts: 10/27 1901 - 10/29 0700  In: 207.5 [I.V.:0.2]  Out: 2490 [Urine:1200]    Intake/Output Summary (Last 24 hours) at 10/29/2024 0748  Last data filed at 10/29/2024 0650  Gross per 24 hour   Intake 157.54 ml   Output 1290 ml   Net -1132.46 ml           Physical Exam:    General:  Alert, cooperative, no distress, appears stated age.   Head:  Normocephalic, without obvious abnormality, atraumatic.   Eyes:  Conjunctivae/corneas clear. PERRL, EOMs intact.   Nose: Nares normal. Septum midline. Mucosa normal. No drainage or sinus tenderness.   Throat: Lips, mucosa, and tongue normal. Teeth and gums normal.   Neck: Supple, symmetrical, trachea midline, no adenopathy, thyroid: no enlargment/tenderness/nodules, no carotid bruit and no JVD.   Back:   Symmetric, no curvature. ROM normal.   Lungs:   Clear to auscultation bilaterally. Left sided rales, air movement in all lobes. Chest

## 2024-10-29 NOTE — PROGRESS NOTES
4 Eyes Skin Assessment     NAME:  Delfin Shetty  YOB: 1955  MEDICAL RECORD NUMBER:  [id number]    The patient is being assessed for  Shift Handoff    I agree that at least one RN has performed a thorough Head to Toe Skin Assessment on the patient. ALL assessment sites listed below have been assessed.      Areas assessed by both nurses:    Head, Face, Ears, Shoulders, Back, Chest, Arms, Elbows, Hands, Sacrum. Buttock, Coccyx, Ischium, Legs. Feet and Heels, and Under Medical Devices         Does the Patient have a Wound? Yes wound(s) were present on assessment. LDA wound assessment was Initiated and completed by RN   Chest tube Upper, Left Flank site       Valentin Prevention initiated by RN: Yes  Wound Care Orders initiated by RN: No    Pressure Injury (Stage 3,4, Unstageable, DTI, NWPT, and Complex wounds) if present, place Wound referral order by RN under : No    New Ostomies, if present place, Ostomy referral order under : No     Nurse 1 eSignature: Electronically signed by Mariluz Felix RN on 10/29/24 at 7:52 AM EDT    **SHARE this note so that the co-signing nurse can place an eSignature**    Nurse 2 eSignature: {Esignature:[id number]}

## 2024-10-29 NOTE — PROGRESS NOTES
Infectious Disease Progress Note        Reason: Evaluate for severe sepsis/empyema    Current abx Prior abx   Pip/tazo since 10/23/24 Vancomycin 10/23-10/24     Lines:       Assessment :   68 y.o. male with a PMHx of tobacco abuse, HTN, HLD who presented to the ED on 10/23/24 with c/o SOB associated with cough and chest pain ongoing for the past 2 weeks.    I agree that initial clinical presentation is concerning for sepsis/empyema.  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    Status post thoracentesis, chest tube insertion on 10/24/2024.  No neutrophils seen on pleural fluid argues against empyema.  Pleural fluid cultures 10/24-no organisms  Biopsy positive for squamous cell carcinoma      Recommendations:    D/c piperacillin/tazobactam after pm dose  Management of chest tube per pulmonary/IR  Follow up oncology recommendations regarding lung cancer  Monitor clinically    Above plan was discussed in details with patient, primary team. Please call me if any further questions or concerns. Will continue to participate in the care of this patient.    HPI:    No new complaints.     Past Medical History:   Diagnosis Date    HLD (hyperlipidemia) 10/23/2024    Primary hypertension 10/23/2024       Past Surgical History:   Procedure Laterality Date    CT BIOPSY SOFT TISSUE NECK  10/24/2024    CT BIOPSY LUNG/MEDIASTINUM PERC 10/24/2024 Oc Gramajo MD [facility] RAD CT       [unfilled]    Current Facility-Administered Medications   Medication Dose Route Frequency    diatrizoate meglumine-sodium (GASTROGRAFIN) 66-10 % solution 30 mL  30 mL Oral ONCE PRN    ipratropium (ATROVENT) 0.02 % nebulizer solution 0.5 mg  0.5 mg Nebulization TID RT    morphine (PF) injection 2 mg  2 mg IntraVENous Q2H PRN    Or    morphine sulfate  REQUESTS        Culture MRSA NOT PRESENT               Screening of patient nares for MRSA is for surveillance purposes and, if positive, to facilitate isolation considerations in high risk settings. It is not intended for automatic decolonization interventions per se as regimens are not sufficiently effective to warrant routine use.      Smear Fungus [[id number]] Collected: 10/23/24 2230    Order Status: Canceled Specimen: Pleural     Gram stain [[id number]] Collected: 10/23/24 2230    Order Status: Canceled Specimen: Body Fluid     Blood Culture 2 [[id number]] Collected: 10/23/24 1335    Order Status: Completed Specimen: Blood Updated: 10/29/24 0713     Special Requests NO SPECIAL REQUESTS        Culture NO GROWTH 6 DAYS       COVID-19 & Influenza Combo [[id number]] Collected: 10/23/24 1333    Order Status: Completed Specimen: Nasopharyngeal Updated: 10/23/24 1417     Source Nasopharyngeal        SARS-CoV-2, PCR Not detected        Comment: Not Detected results do not preclude SARS-CoV-2 infection and should not be used as the sole basis for patient management decisions.Results must be combined with clinical observations, patient history, and epidemiological information.        Rapid Influenza A By PCR Not detected        Rapid Influenza B By PCR Not detected        Comment: Testing was performed using samuel Jenni SARS-CoV-2 and Influenza A/B nucleic acid assay.  This test is a multiplex Real-Time Reverse Transcriptase Polymerase Chain Reaction (RT-PCR) based in vitro diagnostic test intended for the qualitative detection of nucleic acids from SARS-CoV-2, Influenza A, and Influenza B in nasopharyngeal specimens.         Blood Culture 1 [[id number]] Collected: 10/23/24 1330    Order Status: Completed Specimen: Blood Updated: 10/29/24 0713     Special Requests NO SPECIAL REQUESTS        Culture NO GROWTH 6 DAYS                   RADIOLOGY:    All available imaging studies/reports in [facility] for this admission

## 2024-10-29 NOTE — PROGRESS NOTES
Interventional Radiology     Consult received from Dr. Recinos for evaluation of squamous cell lung carcinoma.     Case and images reviewed by Dr. Farfan.     Will plan for image-guided right sided mediport placement with moderate sedation 10/30/24 as IR schedule allows.    Patient to remain NPO after midnight with any blood thinning medications held.     Orders placed. Consent to be obtained prior to procedure.      Thank you,  TR Hernández  2020

## 2024-10-30 ENCOUNTER — APPOINTMENT (OUTPATIENT)
Facility: HOSPITAL | Age: 69
End: 2024-10-30
Payer: MEDICARE

## 2024-10-30 ENCOUNTER — HOSPITAL ENCOUNTER (INPATIENT)
Facility: HOSPITAL | Age: 69
Discharge: HOME | End: 2024-11-02
Payer: MEDICARE

## 2024-10-30 VITALS
OXYGEN SATURATION: 94 % | DIASTOLIC BLOOD PRESSURE: 57 MMHG | HEART RATE: 122 BPM | SYSTOLIC BLOOD PRESSURE: 122 MMHG | RESPIRATION RATE: 29 BRPM

## 2024-10-30 LAB
ANION GAP SERPL CALC-SCNC: 9 MMOL/L (ref 3–18)
BUN SERPL-MCNC: 10 MG/DL (ref 7–18)
BUN/CREAT SERPL: 16 (ref 12–20)
CALCIUM SERPL-MCNC: 9 MG/DL (ref 8.5–10.1)
CHLORIDE SERPL-SCNC: 102 MMOL/L (ref 100–111)
CO2 SERPL-SCNC: 26 MMOL/L (ref 21–32)
CREAT SERPL-MCNC: 0.63 MG/DL (ref 0.6–1.3)
GLUCOSE BLD STRIP.AUTO-MCNC: 104 MG/DL (ref 70–110)
GLUCOSE SERPL-MCNC: 69 MG/DL (ref 74–99)
POTASSIUM SERPL-SCNC: 3.6 MMOL/L (ref 3.5–5.5)
SODIUM SERPL-SCNC: 137 MMOL/L (ref 136–145)

## 2024-10-30 PROCEDURE — 0W9B3ZZ DRAINAGE OF LEFT PLEURAL CAVITY, PERCUTANEOUS APPROACH: ICD-10-PCS | Performed by: STUDENT IN AN ORGANIZED HEALTH CARE EDUCATION/TRAINING PROGRAM

## 2024-10-30 PROCEDURE — 82962 GLUCOSE BLOOD TEST: CPT

## 2024-10-30 PROCEDURE — 6360000002 HC RX W HCPCS: Performed by: STUDENT IN AN ORGANIZED HEALTH CARE EDUCATION/TRAINING PROGRAM

## 2024-10-30 PROCEDURE — 6360000002 HC RX W HCPCS: Performed by: HEALTH CARE PROVIDER

## 2024-10-30 PROCEDURE — 97535 SELF CARE MNGMENT TRAINING: CPT

## 2024-10-30 PROCEDURE — 2500000003 HC RX 250 WO HCPCS: Performed by: PHYSICIAN ASSISTANT

## 2024-10-30 PROCEDURE — 6360000002 HC RX W HCPCS: Performed by: PHYSICIAN ASSISTANT

## 2024-10-30 PROCEDURE — APPSS15 APP SPLIT SHARED TIME 0-15 MINUTES

## 2024-10-30 PROCEDURE — 2580000003 HC RX 258: Performed by: PHYSICIAN ASSISTANT

## 2024-10-30 PROCEDURE — 99232 SBSQ HOSP IP/OBS MODERATE 35: CPT | Performed by: STUDENT IN AN ORGANIZED HEALTH CARE EDUCATION/TRAINING PROGRAM

## 2024-10-30 PROCEDURE — 36415 COLL VENOUS BLD VENIPUNCTURE: CPT

## 2024-10-30 PROCEDURE — 2700000000 HC OXYGEN THERAPY PER DAY

## 2024-10-30 PROCEDURE — 94664 DEMO&/EVAL PT USE INHALER: CPT

## 2024-10-30 PROCEDURE — 6370000000 HC RX 637 (ALT 250 FOR IP): Performed by: STUDENT IN AN ORGANIZED HEALTH CARE EDUCATION/TRAINING PROGRAM

## 2024-10-30 PROCEDURE — 99232 SBSQ HOSP IP/OBS MODERATE 35: CPT | Performed by: INTERNAL MEDICINE

## 2024-10-30 PROCEDURE — 71045 X-RAY EXAM CHEST 1 VIEW: CPT

## 2024-10-30 PROCEDURE — 0JH60WZ INSERTION OF TOTALLY IMPLANTABLE VASCULAR ACCESS DEVICE INTO CHEST SUBCUTANEOUS TISSUE AND FASCIA, OPEN APPROACH: ICD-10-PCS | Performed by: STUDENT IN AN ORGANIZED HEALTH CARE EDUCATION/TRAINING PROGRAM

## 2024-10-30 PROCEDURE — 02HV33Z INSERTION OF INFUSION DEVICE INTO SUPERIOR VENA CAVA, PERCUTANEOUS APPROACH: ICD-10-PCS | Performed by: STUDENT IN AN ORGANIZED HEALTH CARE EDUCATION/TRAINING PROGRAM

## 2024-10-30 PROCEDURE — 6370000000 HC RX 637 (ALT 250 FOR IP): Performed by: PHYSICIAN ASSISTANT

## 2024-10-30 PROCEDURE — 80048 BASIC METABOLIC PNL TOTAL CA: CPT

## 2024-10-30 PROCEDURE — 76000 FLUOROSCOPY <1 HR PHYS/QHP: CPT

## 2024-10-30 PROCEDURE — 94640 AIRWAY INHALATION TREATMENT: CPT

## 2024-10-30 PROCEDURE — 94669 MECHANICAL CHEST WALL OSCILL: CPT

## 2024-10-30 PROCEDURE — 94761 N-INVAS EAR/PLS OXIMETRY MLT: CPT

## 2024-10-30 PROCEDURE — 1100000003 HC PRIVATE W/ TELEMETRY

## 2024-10-30 RX ORDER — LIDOCAINE HYDROCHLORIDE AND EPINEPHRINE BITARTRATE 20; .01 MG/ML; MG/ML
INJECTION, SOLUTION SUBCUTANEOUS PRN
Status: COMPLETED | OUTPATIENT
Start: 2024-10-30 | End: 2024-10-30

## 2024-10-30 RX ORDER — HEPARIN SODIUM (PORCINE) LOCK FLUSH IV SOLN 100 UNIT/ML 100 UNIT/ML
SOLUTION INTRAVENOUS PRN
Status: COMPLETED | OUTPATIENT
Start: 2024-10-30 | End: 2024-10-30

## 2024-10-30 RX ORDER — MIDAZOLAM HYDROCHLORIDE 1 MG/ML
INJECTION, SOLUTION INTRAMUSCULAR; INTRAVENOUS PRN
Status: COMPLETED | OUTPATIENT
Start: 2024-10-30 | End: 2024-10-30

## 2024-10-30 RX ORDER — FENTANYL CITRATE 50 UG/ML
INJECTION, SOLUTION INTRAMUSCULAR; INTRAVENOUS PRN
Status: COMPLETED | OUTPATIENT
Start: 2024-10-30 | End: 2024-10-30

## 2024-10-30 RX ADMIN — MIDAZOLAM 0.5 MG: 1 INJECTION INTRAMUSCULAR; INTRAVENOUS at 15:46

## 2024-10-30 RX ADMIN — ARFORMOTEROL TARTRATE 15 MCG: 15 SOLUTION RESPIRATORY (INHALATION) at 20:21

## 2024-10-30 RX ADMIN — MORPHINE SULFATE 4 MG: 4 INJECTION, SOLUTION INTRAMUSCULAR; INTRAVENOUS at 23:23

## 2024-10-30 RX ADMIN — WATER 2000 MG: 1 INJECTION, SOLUTION INTRAMUSCULAR; INTRAVENOUS; SUBCUTANEOUS at 15:50

## 2024-10-30 RX ADMIN — IPRATROPIUM BROMIDE 0.5 MG: 0.5 SOLUTION RESPIRATORY (INHALATION) at 14:44

## 2024-10-30 RX ADMIN — SODIUM CHLORIDE, PRESERVATIVE FREE 10 ML: 5 INJECTION INTRAVENOUS at 09:17

## 2024-10-30 RX ADMIN — ATORVASTATIN CALCIUM 10 MG: 10 TABLET, FILM COATED ORAL at 20:55

## 2024-10-30 RX ADMIN — FENTANYL CITRATE 25 MCG: 50 INJECTION INTRAMUSCULAR; INTRAVENOUS at 16:07

## 2024-10-30 RX ADMIN — SODIUM CHLORIDE, PRESERVATIVE FREE 10 ML: 5 INJECTION INTRAVENOUS at 20:55

## 2024-10-30 RX ADMIN — IPRATROPIUM BROMIDE 0.5 MG: 0.5 SOLUTION RESPIRATORY (INHALATION) at 08:16

## 2024-10-30 RX ADMIN — THERA TABS 1 TABLET: TAB at 09:17

## 2024-10-30 RX ADMIN — IPRATROPIUM BROMIDE 0.5 MG: 0.5 SOLUTION RESPIRATORY (INHALATION) at 20:21

## 2024-10-30 RX ADMIN — FENTANYL CITRATE 25 MCG: 50 INJECTION INTRAMUSCULAR; INTRAVENOUS at 15:56

## 2024-10-30 RX ADMIN — BUDESONIDE 1 MG: 1 SUSPENSION RESPIRATORY (INHALATION) at 20:21

## 2024-10-30 RX ADMIN — BUDESONIDE 1 MG: 1 SUSPENSION RESPIRATORY (INHALATION) at 08:16

## 2024-10-30 RX ADMIN — ARFORMOTEROL TARTRATE 15 MCG: 15 SOLUTION RESPIRATORY (INHALATION) at 08:16

## 2024-10-30 RX ADMIN — LIDOCAINE HYDROCHLORIDE,EPINEPHRINE BITARTRATE 10 ML: 20; .01 INJECTION, SOLUTION INFILTRATION; PERINEURAL at 15:54

## 2024-10-30 RX ADMIN — LIDOCAINE HYDROCHLORIDE,EPINEPHRINE BITARTRATE 10 ML: 20; .01 INJECTION, SOLUTION INFILTRATION; PERINEURAL at 16:07

## 2024-10-30 RX ADMIN — MIDAZOLAM 0.5 MG: 1 INJECTION INTRAMUSCULAR; INTRAVENOUS at 15:56

## 2024-10-30 RX ADMIN — FENTANYL CITRATE 25 MCG: 50 INJECTION INTRAMUSCULAR; INTRAVENOUS at 15:46

## 2024-10-30 RX ADMIN — HEPARIN SODIUM (PORCINE) LOCK FLUSH IV SOLN 100 UNIT/ML 500 UNITS: 100 SOLUTION at 16:18

## 2024-10-30 RX ADMIN — FENTANYL CITRATE 25 MCG: 50 INJECTION INTRAMUSCULAR; INTRAVENOUS at 15:53

## 2024-10-30 RX ADMIN — MIDAZOLAM 0.5 MG: 1 INJECTION INTRAMUSCULAR; INTRAVENOUS at 16:07

## 2024-10-30 RX ADMIN — MIDAZOLAM 0.5 MG: 1 INJECTION INTRAMUSCULAR; INTRAVENOUS at 15:53

## 2024-10-30 ASSESSMENT — PAIN DESCRIPTION - DESCRIPTORS: DESCRIPTORS: ACHING

## 2024-10-30 ASSESSMENT — PAIN DESCRIPTION - ORIENTATION: ORIENTATION: RIGHT

## 2024-10-30 ASSESSMENT — PAIN SCALES - GENERAL
PAINLEVEL_OUTOF10: 0
PAINLEVEL_OUTOF10: 8
PAINLEVEL_OUTOF10: 0
PAINLEVEL_OUTOF10: 0

## 2024-10-30 ASSESSMENT — PAIN DESCRIPTION - LOCATION: LOCATION: CHEST

## 2024-10-30 ASSESSMENT — PAIN DESCRIPTION - ONSET: ONSET: PROGRESSIVE

## 2024-10-30 ASSESSMENT — PAIN DESCRIPTION - PAIN TYPE: TYPE: SURGICAL PAIN

## 2024-10-30 ASSESSMENT — PAIN - FUNCTIONAL ASSESSMENT: PAIN_FUNCTIONAL_ASSESSMENT: PREVENTS OR INTERFERES SOME ACTIVE ACTIVITIES AND ADLS

## 2024-10-30 ASSESSMENT — PAIN DESCRIPTION - FREQUENCY: FREQUENCY: INTERMITTENT

## 2024-10-30 NOTE — CARE COORDINATION
Patient medicaid has lapsed due to not re-certifying. FERNY emailed Dianna with [employer] to see if she can assist the patient. FERNY is waiting for a response. Patient is open to going to LTC, however, his medicaid will have to be re-instated. The patient stated he has no family support.       1:55 pm    Shruthi Roth is assisting the patient with completing the re-certification paperwork in his room. FERNY informed Shruthi once it is completed she will fax it to [facility].     Donovan Paul, MSCARLOS     [phone]

## 2024-10-30 NOTE — PLAN OF CARE
Problem: Occupational Therapy - Adult  Goal: By Discharge: Performs self-care activities at highest level of function for planned discharge setting.  See evaluation for individualized goals.  Description: Occupational Therapy Goals:  Initiated 10/25/2024 to be met within 7-10 days.    1.  Patient will perform grooming with supervision/set-up standing at sink with good balance.   2.  Patient will perform bathing with supervision/set-up.  3.  Patient will perform lower body dressing  with supervision/set-up.  4.  Patient will perform toilet transfers with supervision/set-up  5.  Patient will perform all aspects of toileting with supervision/set-up.  6.  Patient will participate in upper extremity therapeutic exercise/activities with supervision/set-up for 8-10 minutes to increase strength/endurance for ADLs.    7.  Patient will utilize energy conservation techniques during functional activities with verbal cues.    PLOF: Pt lives alone in shed, 2 steps to enter, tub only, previously independent with ADLs.      Outcome: Progressing   OCCUPATIONAL THERAPY TREATMENT    Patient: Delfin Shetty (68 y.o. male)  Date: 10/30/2024  Diagnosis: Hypokalemia [E87.6]  Empyema lung (HCC) [J86.9]  Bullous emphysema (HCC) [J43.9]  Acute respiratory failure with hypoxemia [J96.01]  Sepsis due to pneumonia (HCC) [J18.9, A41.9]  Pneumonia of left lung due to infectious organism, unspecified part of lung [J18.9]  Large pleural effusion [J90] Severe sepsis (HCC)      Precautions: Fall Risk, General Precautions    Chart, occupational therapy assessment, plan of care, and goals were reviewed.  ASSESSMENT:  Pt is pleasant and cooperative. Now w/L chest tube. Requires vc's for safety w/functional transfer training. Educated on energy conservation techniques w/ADLs, demonstrating w/ADL grooming tasks. Pt returned to supine and left w/all items within reach. Pt tachycardic w/activity 130s, 120-110s at rest. No c/o pain.     Progression

## 2024-10-30 NOTE — PROGRESS NOTES
Infectious Disease Progress Note        Reason: Evaluate for severe sepsis/empyema    Current abx Prior abx   Pip/tazo since 10/23/24-10/29 Vancomycin 10/23-10/24     Lines:       Assessment :   68 y.o. male with a PMHx of tobacco abuse, HTN, HLD who presented to the ED on 10/23/24 with c/o SOB associated with cough and chest pain ongoing for the past 2 weeks.    I agree that initial clinical presentation is concerning for sepsis/empyema.  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    Status post thoracentesis, chest tube insertion on 10/24/2024.  No neutrophils seen on pleural fluid argues against empyema.  Pleural fluid cultures 10/24-no organisms  Biopsy positive for squamous cell carcinoma    Low grade fever on 10/29 could be due to malignancy    Recommendations:    Monitor off antibiotics  Management of chest tube per pulmonary/IR  Follow up oncology recommendations regarding lung cancer  No additional recommendations from infectious disease standpoint at this time    Will sign off.  Follow-up as needed thanks      HPI:    No new complaints.  In good spirits    Past Medical History:   Diagnosis Date    HLD (hyperlipidemia) 10/23/2024    Primary hypertension 10/23/2024       Past Surgical History:   Procedure Laterality Date    CT BIOPSY SOFT TISSUE NECK  10/24/2024    CT BIOPSY LUNG/MEDIASTINUM PERC 10/24/2024 Oc Gramajo MD [facility] RAD CT       [unfilled]    Current Facility-Administered Medications   Medication Dose Route Frequency    diatrizoate meglumine-sodium (GASTROGRAFIN) 66-10 % solution 30 mL  30 mL Oral ONCE PRN    ipratropium (ATROVENT) 0.02 % nebulizer solution 0.5 mg  0.5 mg Nebulization TID RT    morphine (PF) injection 2 mg  2 mg IntraVENous Q2H PRN    Or    morphine sulfate (PF)  grammar and contents. Even though attempts were made to correct all the mistakes, some may have been missed, and remained in the body of the document. If questions arise, please contact our department.    Dr. Adelia Recinos, Infectious Disease Specialist  [phone]  October 30, 2024  2:42 PM

## 2024-10-30 NOTE — CARE COORDINATION
Martinez sent the patient clinicals in [facility] for LTC placement. Waiting on an accepting facility.       Donovan Paul, PATRICK     [phone]

## 2024-10-30 NOTE — PROGRESS NOTES
Hematology / Oncology Progress Note      Patient: Delfin Shetty  Gender: male   MRN: [id number]    YOB: 1955 Age: 68 y.o.   CSN: [id number]    LOS:  LOS: 7 days   Admit Date: 10/23/2024     PCP: Jenifer Luz APRN - NP    Date of evaluation: 10/30/2024     Assessment:       ICD-10-CM    1. Sepsis due to pneumonia (HCC)  J18.9     A41.9       2. Pneumonia of left lung due to infectious organism, unspecified part of lung  J18.9       3. Large pleural effusion  J90       4. Acute respiratory failure with hypoxemia  J96.01       5. Bullous emphysema (HCC)  J43.9       6. Hypokalemia  E87.6       7. Empyema lung (HCC)  J86.9            1.  Large left malignant pleural effusion secondary to poorly differentiated carcinoma [squamous cell lung carcinoma] status post thoracentesis with pleural biopsy and left chest tube placement on 10/24/2024  2.  Left lateral apex masslike consolidation likely primary lesion  3.  Acute hypoxic respiratory failure secondary to COPD, large left pleural effusion and newly diagnosed lung cancer  4.  5 pound unintentional weight loss secondary to malignancy  6.  Neutrophilic leukocytosis, thrombocytosis and eosinophilia secondary to malignancy  7.  Anemia, normocytic, normochromic most likely secondary to malignancy     Pertinent imaging:  10/23/2024 CT chest PE protocol showed no evidence of pulmonary embolus or right heart strain.     There is a huge relatively loculated subpulmonic pleural effusion in the lower  chest depressing an inverting the left hemidiaphragm and displace the abdominal  contents. There is complete atelectasis of the left lower lobe and much of the  lingula. There is a combination of atelectasis and peripheral consolidation  involving the left upper lobe with additional peripheral somewhat loculated  fluid. There is a somewhat masslike consolidation in the lateral left apex which  is somewhat for possible mass and should be monitored on  (Peripheral Line), 10 mEq, IntraVENous, PRN, Millie Sam PA    magnesium sulfate 2000 mg in 50 mL IVPB premix, 2,000 mg, IntraVENous, PRN, Millie Sam PA    enoxaparin (LOVENOX) injection 40 mg, 40 mg, SubCUTAneous, Daily, Júnior Colon MD, 40 mg at 10/29/24 0917    ondansetron (ZOFRAN-ODT) disintegrating tablet 4 mg, 4 mg, Oral, Q8H PRN **OR** ondansetron (ZOFRAN) injection 4 mg, 4 mg, IntraVENous, Q6H PRN, Millie Sam PA    polyethylene glycol (GLYCOLAX) packet 17 g, 17 g, Oral, Daily PRN, Millie Sam PA    bisacodyl (DULCOLAX) suppository 10 mg, 10 mg, Rectal, Daily PRN, Millie Sam PA    acetaminophen (TYLENOL) tablet 650 mg, 650 mg, Oral, Q6H PRN, 650 mg at 10/24/24 1652 **OR** acetaminophen (TYLENOL) suppository 650 mg, 650 mg, Rectal, Q6H PRN, Millie Sam PA    atorvastatin (LIPITOR) tablet 10 mg, 10 mg, Oral, Nightly, Millie Sam PA, 10 mg at 10/29/24 2109    budesonide (PULMICORT) nebulizer suspension 1 mg, 1 mg, Nebulization, BID, Millie Sam PA, 1 mg at 10/30/24 0816    arformoterol tartrate (BROVANA) nebulizer solution 15 mcg, 15 mcg, Nebulization, BID RT, Millie Sam PA, 15 mcg at 10/30/24 0816          Sebastien Recinos MD  [facility]  Office  [phone]    This report has been produced using speech recognition software and may contain errors related to that system (errors in grammar, punctuation, spelling and possibly words and phrases that may be inappropriate). If there are any questions or concerns please feel free to contact the dictating provider for clarification.

## 2024-10-30 NOTE — PROGRESS NOTES
[facility]  Pulmonary, Critical Care, and Sleep Medicine    Name: Delfin Shetty MRN: [id number]   : 1955 Hospital: [facility]   Date: 10/30/2024        Pulmonary Medicine: Daily Progress Note    Admission Date:   10/23/2024  LOS: 7  MAR reviewed and pertinent medications noted or modified as needed    IMPRESSION:   Large L malignant pleural effusion with loculations and tension contralateral mediastinal shift. Concern for mesothelioma given appearance of pleura or metastatic lung cancer given mass like consolidation L apex. Cannot R/o pneumonia ?post obstructive with leukocytosis and resulting parapneumonic effusion  s/p chest tube 10/24- exudative fluid- lymphocytic and eosinophilic.   Not likely infected, cultures with no growth since 10/24, pH>7.2, Glucose >4, procal neg  Cytology negative for malignant cells  Pathology of left pleural positive for SCC  Flu and COVID PCR negative  Receiving intrapleural lytic therapy since 10/26.  POORLY DIFFERENTIATED CARCINOMA OF LEFT LUNG, FAVOR SQUAMOUS CELL CARCINOMA from left pleural biopsy of 10/24. Staining for PD-L1 expression pending  Mass-like consolidation in the lateral left apex, likely malignant  Acute hypoxic respiratory failure due to combination of passive atelectasis, consolidation, COPD. Improving with lung re-expansion and less tachypnea and less oxygen requirement  Atelectasis, left lung, compressive vs obstructive, with elevated diaphragm, improving  COPD with bullous emphysema on imaging, not in exacerbation  Unintended weight loss concerning for malignancy  Former smoker  Hypertension   Sinus tachycardia, likely physiologic  Dyslipidemia   Hyponatremia, mild  Anemia, normocytic, normochromic  Leukocytosis with neutrophilia, elevated monocytes and eosinophilia  Thrombocytosis  Peripheral Eosinophilia, likely secondary to malignancy       Patient Active Problem List   Diagnosis    Severe sepsis (HCC)     was 15  minutes, which includes only time during which I was engaged in work directly related to the patient's care as described above.    During this entire length of time I was immediately available to the patient. The reason for providing this level of medical care for this critically ill patient was due a critical illness that impaired one or more vital organ systems such that there was a high probability of imminent or life threatening deterioration in the patients condition. This care involved high complexity decision making to assess, manipulate, and support vital system functions, to treat this degreee vital organ system failure and to prevent further life threatening deterioration of the patient’s condition      Complex decision making was made in the evaluation and management plans during this consultation.  More than 50% of time was spent in counseling and coordination of care including review of data and discussion with other team members.        Joesph Jenkins, PhD/DOTTY Espinoza [facility]  Pulmonary, Critical Care, and Sleep Medicine                   Please note that this dictation was completed with Flexenclosure, the computer voice recognition software.  Quite often unanticipated grammatical, syntax, homophones, and other interpretive errors are inadvertently transcribed by the computer software.  Please disregard these errors.  Please excuse any errors that have escaped final proofreading.

## 2024-10-30 NOTE — PROCEDURES
Interventional Radiology Brief Post Procedure Note     Procedure Performed: Mediport placement under fluoroscopic guidance     : TR Hernández     Assistant: None    Attending: Dr. Farfan     Pre-operative Diagnosis: squamous cell carcinoma requiring chemotherapy treatment     Post-operative Diagnosis: Same      Anesthesia: 1% lidocaine with epi and IV moderate sedation with Versed and Fentanyl administered and monitored by qualified nursing staff.      Findings:  - Successful placement of a right infraclavicular single lumen mediport under image guidance  - Catheter tip at the SVC/RA junction  - Please refer to report on PACS for full details  - Mediport is OK for use     Specimens: None     Complications: No immediate     Estimated Blood Loss:  Minimal     Blood transfusions:  None.     Implants: Please see PACS report.      Condition: Stable      Disposition: Nursing recovery unit for 1 hour     Impression: Successful 6 Fr single lumen Mediport placement     TR Hernández

## 2024-10-30 NOTE — PROGRESS NOTES
TRANSFER - OUT REPORT:    Verbal report given to ASHLEY Esquivel (name) on [facility] being transferred to [address] [place] [address] (unit) for routine progression of patient care       Report consisted of patient's Situation, Background, Assessment and   Recommendations(SBAR).     Information from the following report(s) Nurse Handoff Report was reviewed with the receiving nurse.    Opportunity for questions and clarification was provided.      Patient transported with:   O2 @ 3 liters

## 2024-10-30 NOTE — PROGRESS NOTES
Preprocedure Assessment      Today 10/30/2024     Indication/Symptoms:  Delfin Shetty is a 68 y.o. male with squamous cell carcinoma of lung presents for mediport placement     The H & P and/or progress notes and any available imaging were reviewed.  The risks, indications and possible alternatives to the procedure, including doing nothing, were discussed and informed consent was obtained.    Physical Exam:    Mallampati 2 ASA 3   General: A&O x 4, NAD   Heart:   tachycardic  Lungs:   2L O2 via nasal cannula, Normal work of breathing    The patient is an appropriate candidate to undergo the planned procedure and sedation.    TR Hernández

## 2024-10-31 ENCOUNTER — APPOINTMENT (OUTPATIENT)
Facility: HOSPITAL | Age: 69
End: 2024-10-31
Payer: MEDICARE

## 2024-10-31 LAB
ANION GAP SERPL CALC-SCNC: 6 MMOL/L (ref 3–18)
BUN SERPL-MCNC: 9 MG/DL (ref 7–18)
BUN/CREAT SERPL: 17 (ref 12–20)
CALCIUM SERPL-MCNC: 8.6 MG/DL (ref 8.5–10.1)
CHLORIDE SERPL-SCNC: 102 MMOL/L (ref 100–111)
CO2 SERPL-SCNC: 25 MMOL/L (ref 21–32)
CREAT SERPL-MCNC: 0.52 MG/DL (ref 0.6–1.3)
GLUCOSE SERPL-MCNC: 93 MG/DL (ref 74–99)
POTASSIUM SERPL-SCNC: 4 MMOL/L (ref 3.5–5.5)
SODIUM SERPL-SCNC: 133 MMOL/L (ref 136–145)

## 2024-10-31 PROCEDURE — 99232 SBSQ HOSP IP/OBS MODERATE 35: CPT | Performed by: STUDENT IN AN ORGANIZED HEALTH CARE EDUCATION/TRAINING PROGRAM

## 2024-10-31 PROCEDURE — 71045 X-RAY EXAM CHEST 1 VIEW: CPT

## 2024-10-31 PROCEDURE — 6360000002 HC RX W HCPCS: Performed by: PHYSICIAN ASSISTANT

## 2024-10-31 PROCEDURE — 6370000000 HC RX 637 (ALT 250 FOR IP): Performed by: STUDENT IN AN ORGANIZED HEALTH CARE EDUCATION/TRAINING PROGRAM

## 2024-10-31 PROCEDURE — 80048 BASIC METABOLIC PNL TOTAL CA: CPT

## 2024-10-31 PROCEDURE — 97530 THERAPEUTIC ACTIVITIES: CPT

## 2024-10-31 PROCEDURE — 94761 N-INVAS EAR/PLS OXIMETRY MLT: CPT

## 2024-10-31 PROCEDURE — 6370000000 HC RX 637 (ALT 250 FOR IP): Performed by: PHYSICIAN ASSISTANT

## 2024-10-31 PROCEDURE — 94640 AIRWAY INHALATION TREATMENT: CPT

## 2024-10-31 PROCEDURE — 0WPBX0Z REMOVAL OF DRAINAGE DEVICE FROM LEFT PLEURAL CAVITY, EXTERNAL APPROACH: ICD-10-PCS | Performed by: STUDENT IN AN ORGANIZED HEALTH CARE EDUCATION/TRAINING PROGRAM

## 2024-10-31 PROCEDURE — 2580000003 HC RX 258: Performed by: PHYSICIAN ASSISTANT

## 2024-10-31 PROCEDURE — APPSS15 APP SPLIT SHARED TIME 0-15 MINUTES

## 2024-10-31 PROCEDURE — 99232 SBSQ HOSP IP/OBS MODERATE 35: CPT | Performed by: INTERNAL MEDICINE

## 2024-10-31 PROCEDURE — 6360000002 HC RX W HCPCS: Performed by: STUDENT IN AN ORGANIZED HEALTH CARE EDUCATION/TRAINING PROGRAM

## 2024-10-31 PROCEDURE — 6360000002 HC RX W HCPCS: Performed by: HEALTH CARE PROVIDER

## 2024-10-31 PROCEDURE — 2700000000 HC OXYGEN THERAPY PER DAY

## 2024-10-31 PROCEDURE — 1100000003 HC PRIVATE W/ TELEMETRY

## 2024-10-31 PROCEDURE — 97168 OT RE-EVAL EST PLAN CARE: CPT

## 2024-10-31 PROCEDURE — 36415 COLL VENOUS BLD VENIPUNCTURE: CPT

## 2024-10-31 RX ADMIN — BUDESONIDE 1 MG: 1 SUSPENSION RESPIRATORY (INHALATION) at 20:18

## 2024-10-31 RX ADMIN — ATORVASTATIN CALCIUM 10 MG: 10 TABLET, FILM COATED ORAL at 20:30

## 2024-10-31 RX ADMIN — MORPHINE SULFATE 4 MG: 4 INJECTION, SOLUTION INTRAMUSCULAR; INTRAVENOUS at 06:31

## 2024-10-31 RX ADMIN — SODIUM CHLORIDE, PRESERVATIVE FREE 10 ML: 5 INJECTION INTRAVENOUS at 20:29

## 2024-10-31 RX ADMIN — THERA TABS 1 TABLET: TAB at 09:26

## 2024-10-31 RX ADMIN — BUDESONIDE 1 MG: 1 SUSPENSION RESPIRATORY (INHALATION) at 08:20

## 2024-10-31 RX ADMIN — SODIUM CHLORIDE, PRESERVATIVE FREE 10 ML: 5 INJECTION INTRAVENOUS at 09:26

## 2024-10-31 RX ADMIN — ENOXAPARIN SODIUM 40 MG: 100 INJECTION SUBCUTANEOUS at 09:26

## 2024-10-31 RX ADMIN — IPRATROPIUM BROMIDE 0.5 MG: 0.5 SOLUTION RESPIRATORY (INHALATION) at 20:18

## 2024-10-31 RX ADMIN — MORPHINE SULFATE 4 MG: 4 INJECTION, SOLUTION INTRAMUSCULAR; INTRAVENOUS at 16:36

## 2024-10-31 RX ADMIN — ARFORMOTEROL TARTRATE 15 MCG: 15 SOLUTION RESPIRATORY (INHALATION) at 08:20

## 2024-10-31 RX ADMIN — ARFORMOTEROL TARTRATE 15 MCG: 15 SOLUTION RESPIRATORY (INHALATION) at 20:18

## 2024-10-31 RX ADMIN — IPRATROPIUM BROMIDE 0.5 MG: 0.5 SOLUTION RESPIRATORY (INHALATION) at 08:20

## 2024-10-31 ASSESSMENT — PAIN DESCRIPTION - PAIN TYPE
TYPE: SURGICAL PAIN
TYPE: SURGICAL PAIN

## 2024-10-31 ASSESSMENT — PAIN SCALES - GENERAL
PAINLEVEL_OUTOF10: 0
PAINLEVEL_OUTOF10: 0
PAINLEVEL_OUTOF10: 7
PAINLEVEL_OUTOF10: 8
PAINLEVEL_OUTOF10: 0

## 2024-10-31 ASSESSMENT — PAIN DESCRIPTION - ORIENTATION
ORIENTATION: LEFT
ORIENTATION: RIGHT

## 2024-10-31 ASSESSMENT — PAIN - FUNCTIONAL ASSESSMENT
PAIN_FUNCTIONAL_ASSESSMENT: PREVENTS OR INTERFERES SOME ACTIVE ACTIVITIES AND ADLS
PAIN_FUNCTIONAL_ASSESSMENT: ACTIVITIES ARE NOT PREVENTED

## 2024-10-31 ASSESSMENT — PAIN DESCRIPTION - DESCRIPTORS
DESCRIPTORS: DISCOMFORT;ACHING
DESCRIPTORS: ACHING

## 2024-10-31 ASSESSMENT — PAIN DESCRIPTION - ONSET
ONSET: GRADUAL
ONSET: GRADUAL

## 2024-10-31 ASSESSMENT — PAIN DESCRIPTION - FREQUENCY
FREQUENCY: INTERMITTENT
FREQUENCY: INTERMITTENT

## 2024-10-31 ASSESSMENT — PAIN DESCRIPTION - LOCATION
LOCATION: CHEST
LOCATION: CHEST

## 2024-10-31 NOTE — PLAN OF CARE
will remain free of falls and injuries during the course of this admission. Patient educated on safety interventions: wearing non-skid yellow socks at all times, using call light for assistance, and safe exit side of the bed when accompanied by a staff member.

## 2024-10-31 NOTE — PROGRESS NOTES
24 hours) at 10/31/2024 0713  Last data filed at 10/31/2024 0652  Gross per 24 hour   Intake 960 ml   Output 670 ml   Net 290 ml           Physical Exam:    General:  Alert, cooperative, no distress, appears stated age.   Head:  Normocephalic, without obvious abnormality, atraumatic.   Eyes:  Conjunctivae/corneas clear. PERRL, EOMs intact.   Nose: Nares normal. Septum midline. Mucosa normal. No drainage or sinus tenderness.   Throat: Lips, mucosa, and tongue normal. Teeth and gums normal.   Neck: Supple, symmetrical, trachea midline, no adenopathy, thyroid: no enlargment/tenderness/nodules, no carotid bruit and no JVD.   Back:   Symmetric, no curvature. ROM normal.   Lungs:   Clear to auscultation bilaterally. +Egophony in LS. Left sided rales, air movement in all lobes. Chest tube with serous output with some clots, continue atrium, from 550cc yesterday -620 cc this morning, no tidaling present, no air leak present with cough, resolved with securement of chest tube connections   Chest wall:  No tenderness or deformity.- No crepitus. + left sided chest tube, dressed C/D/I   Heart:  Fast rate and rhythm, S1, S2 normal, no murmur, click, rub or gallop.   Abdomen:   Soft, non-tender. Bowel sounds normal. No masses,  No organomegaly.   Extremities: Extremities normal, atraumatic, no cyanosis or edema.   Pulses: 2+ and symmetric all extremities.   Skin: Skin color, texture, turgor normal. No rashes or lesions   Lymph nodes:      Cervical, supraclavicular nodes: unremarkable   Neurologic: Grossly nonfocal       Data:     No results for input(s): \"WBC\", \"HGB\", \"HCT\", \"PLT\" in the last 72 hours.    Recent Labs     10/29/24  0511 10/30/24  0257 10/31/24  0356   * 137 133*   K 3.6 3.6 4.0    102 102   CO2 25 26 25   BUN 11 10 9     No results found for: \"BNP\", \"BNPPOC\", \"BNPNT\"  Lab Results   Component Value Date/Time    INR 1.2 10/23/2024 01:15 PM       No results for input(s): \"PH\", \"PCO2\", \"PO2\", \"HCO3\", \"FIO2\"  condition      Complex decision making was made in the evaluation and management plans during this consultation.  More than 50% of time was spent in counseling and coordination of care including review of data and discussion with other team members.        Joesph Jenkins, PhD/DOTTY Espinoza [facility]  Pulmonary, Critical Care, and Sleep Medicine                   Please note that this dictation was completed with irisnote, the computer voice recognition software.  Quite often unanticipated grammatical, syntax, homophones, and other interpretive errors are inadvertently transcribed by the computer software.  Please disregard these errors.  Please excuse any errors that have escaped final proofreading.

## 2024-10-31 NOTE — PROGRESS NOTES
Nutrition Assessment     Type and Reason for Visit: Reassess    Nutrition Recommendations/Plan:   Continue Regular diet, encouraged high calorie high protein diet at discharge  Add supplement back - High Calorie High Protein TID     Malnutrition Assessment:  Malnutrition Status: Severe malnutrition    Nutrition Assessment:  Follow up. Newest weight 57.7 kg. Spoke with patient on phone who says that's way lower than usual of 155#. Educated pt briefly to follow high protein high calorie diet to regain weight and build muscle while working with PT.    Estimated Daily Nutrient Needs:  Energy (kcal):  5138-9219 kcal (30-35 kcal/kg), underweight Weight Used for Energy Requirements: Current     Protein (g):  58-69 g (1-1.2 g/kg) Weight Used for Protein Requirements: Current        Fluid (ml/day):  5178-2422 mL or per MD Method Used for Fluid Requirements: 1 ml/kcal    Nutrition Related Findings:   last BM 10/30 Wound Type:  (chest tube, sternal puncture)    Current Nutrition Therapies:    ADULT DIET; Regular    Anthropometric Measures:  Height: 170.2 cm (5' 7.01\")  Current Body Wt: 57.7 kg (127 lb 3.3 oz)   BMI: 19.9        Nutrition Diagnosis:   Severe malnutrition, In context of chronic illness (possible mesothelioma/metastatic lung cancer) related to inadequate protein-energy intake as evidenced by Criteria as identified in malnutrition assessment  Underweight related to increase demand for energy/nutrients, inadequate protein-energy intake as evidenced by BMI, weight loss    Nutrition Interventions:   Food and/or Nutrient Delivery: Continue Current Diet, Start Oral Nutrition Supplement  Nutrition Education/Counseling: No recommendation at this time  Coordination of Nutrition Care: Continue to monitor while inpatient  Plan of Care discussed with: Pt    Goals:  Goals: Meet at least 75% of estimated needs, by next RD assessment  Type of Goal: Continue current goal  Previous Goal Met: Progressing toward

## 2024-10-31 NOTE — PLAN OF CARE
Problem: Nutrition Deficit:  Goal: Optimize nutritional status  Outcome: Progressing  Flowsheets (Taken 10/31/2024 1135)  Nutrient intake appropriate for improving, restoring, or maintaining nutritional needs:   Assess nutritional status and recommend course of action   Monitor oral intake, labs, and treatment plans   Recommend appropriate diets, oral nutritional supplements, and vitamin/mineral supplements

## 2024-10-31 NOTE — PROGRESS NOTES
[facility]  Progress Note    Patient: Delfin Shetty Age: 68 y.o. : 1955 MR#: [id number] SSN: [id number]  Date/Time: 10/30/2024    Subjective:   Subjective   No acute events overnight, no new concerns or complaints, vitals stable.  Patient for Mediport placement today    Review of Systems   Constitutional: Negative for fever.      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  Pulmonology will evaluate whether patient requires a Pleurx catheter in conjunction with interventional radiology in particular with the patient who has significant pleural involvement as this patient does.  Continuing on antibiotics for the time being    Mediport placement today.  Ongoing imaging    Wean oxygen as tolerated    Concern regarding where patient will ultimately go to.  Have spoken with patient's friend who states that patient currently lives in a shed.  When asked to clarify if this was a furnished shed or more along the lines of a shed that is used to store tools, patient and friend agree that it is a shed that is similar to store tools.  Given patient may require immunotherapy chemotherapy or other such therapies in the near future, discharged to such a living condition would be essentially equivalent to discharge to homelessness and on the street.  Will need to work on what possible avenues we have for a safe discharge in this regard.  Patient may have Medicaid coverage, will review and work on this    Disposition: Expected location: Home

## 2024-10-31 NOTE — PROGRESS NOTES
[facility]ist [facility]  Progress Note    Patient: Delfin Shetty Age: 68 y.o. : 1955 MR#: [id number] SSN: [id number]  Date/Time: 10/31/2024    Subjective:   Subjective   No acute events overnight, no new concerns or complaints, vitals stable.     Review of Systems   Constitutional: Negative for fever.      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  Hypokalemia  Cachexia  Thrombocytosis  Hypertension  Hyperlipidemia  Tobacco abuse  Eosinophilia  Squamous cell carcinoma, poorly differentiated    Plan  Chest tube removed today at bedside by pulmonology  Poor candidate for Pleurx catheter given pleural findings  Monitoring off antibiotics    Imaging appears complete, has received MRI brain without signs of malignancy, Mediport in place, CT chest abdomen pelvis with all results in the chart    Wean oxygen as tolerated    Disposition: Expected location: C    Expected discharge date: 2024      Case discussed with:  [x]Patient  []Family  [x]Nursing  [x]Case Management  DVT Prophylaxis:  [x]Lovenox  []Hep SQ  [x]SCDs  []Coumadin   []On Heparin gtt   [] DOAC    Objective:   Objective:  General Appearance:  In no acute distress, not in pain, uncomfortable and ill-appearing.    Vital signs: (most recent): Blood pressure (!) 115/56, pulse (!) 119, temperature 99.3 °F (37.4 °C), temperature source Oral, resp. rate (!) 31, height 1.702 m (5' 7.01\"), weight 57.7 kg (127 lb 3.3 oz), SpO2 96%.  No fever.    HEENT: Normal HEENT exam.    Lungs:  Normal respiratory rate and increased effort.  There are decreased breath sounds and rhonchi.    Heart: Tachycardia.  Regular rhythm.  S1 normal and S2 normal.  No murmur, gallop or friction

## 2024-10-31 NOTE — PROGRESS NOTES
4 Eyes Skin Assessment     NAME:  Delfin Shetty  YOB: 1955  MEDICAL RECORD NUMBER:  [id number]    The patient is being assessed for  Shift Handoff    I agree that at least one RN has performed a thorough Head to Toe Skin Assessment on the patient. ALL assessment sites listed below have been assessed.      Areas assessed by both nurses:    Head, Face, Ears, Shoulders, Back, Chest, Arms, Elbows, Hands, Sacrum. Buttock, Coccyx, Ischium, Legs. Feet and Heels, and Under Medical Devices         Does the Patient have a Wound? Yes wound(s) were present on assessment. LDA wound assessment was Initiated and completed by RN  Chest tube present to L side, dressing intact.       Valentin Prevention initiated by RN: Yes  Wound Care Orders initiated by RN: No    Pressure Injury (Stage 3,4, Unstageable, DTI, NWPT, and Complex wounds) if present, place Wound referral order by RN under : No    New Ostomies, if present place, Ostomy referral order under : No     Nurse 1 eSignature: Electronically signed by BEL PALOMINO RN on 10/31/24 at 7:00 AM EDT    **SHARE this note so that the co-signing nurse can place an eSignature**    Nurse 2 eSignature: Electronically signed by Sierra Chung RN on 10/31/24 at 8:07 PM EDT

## 2024-10-31 NOTE — PROGRESS NOTES
0925: Attempted PT treatment. Declines skilled PT treatment at this time. Will follow up.   1316: 2nd PT attempt. Seated in chair. Just finished with OT. Declines further amb at this time. Will follow up.

## 2024-10-31 NOTE — DISCHARGE INSTR - DIET
High-Calorie, High-Protein Nutrition Therapy (2021)    A high-calorie, high-protein diet has been recommended to you. Your registered dietitian  nutritionist (RDN) may have recommended this diet because you are having difficulty eating  enough calories throughout the day, you have lost weight, and/or you need to add protein to your  diet.  Sometimes you may not feel like eating, even if you know the importance of good nutrition. The  recommendations in this handout can help you with the following:  Regaining your strength and energy  Keeping your body healthy  Healing and recovering from surgery or illness and fighting infection    Tips  Schedule Your Meals and Snacks  Several small meals and snacks are often better tolerated and digested than large meals.    Strategies  Plan to eat 3 meals and 3 snacks daily.  [place] with timing meals to find out when you have a larger appetite.  Appetite may be greatest in the morning after not eating all night so you may prefer to  eat your larger meals and snacks in the morning and at lunch.  Breakfast-type foods are often better tolerated so eat foods such as eggs, pancakes, waffles  and cereal for any meal or snack.  Carry snacks with you so you are prepared to eat every 2 to 3 hours.  Determine what works best for you if your body’s cues for feeling hungry or full are not  working.  Eat a small meal or snack even if you don’t feel hungry.  Set a timer to remind you when it is time to eat.  Take a walk before you eat (with health care provider’s approval).  Light or moderate physical activity can help you maintain muscle and increase your  appetite.    Make Eating Enjoyable  Taking steps to make the experience enjoyable may help to increase your interest in eating and  improve your appetite.  Strategies:  Eat with others whenever possible.  Include your favorite foods to make meals more enjoyable.  Try new foods.  Save your beverage for the end of the meal so that you have

## 2024-10-31 NOTE — PROCEDURES
PROCEDURE NOTE  Date: 10/31/2024   Name: Delfin Shetty  YOB: 1955    Procedures: Chest tube removal  Procedure explained to patient.  Sutures removed with  suture removal kit, not buried stitches noted.  Vaseline occlusive dressing applied.  Bulky 4x4 guaze dressing applied.  Chest tube removed with patient humming expiration, with one single sweep without any resistance.  Patient tolerated the procedure well.  No follow up imaging at this time.    Joesph Jenkins, PhD., PA-C.  2:46 PM  Pulmonary, Critical Care Medicine  [facility]

## 2024-10-31 NOTE — PLAN OF CARE
Problem: Occupational Therapy - Adult  Goal: By Discharge: Performs self-care activities at highest level of function for planned discharge setting.  See evaluation for individualized goals.  Description: Occupational Therapy Goals:  Initiated 10/25/2024, re-evaluated 10/31/2024, continue goals to be met within 7-10 days.    1.  Patient will perform grooming with supervision/set-up standing at sink with good balance.   2.  Patient will perform bathing with supervision/set-up.  3.  Patient will perform lower body dressing with supervision/set-up.  4.  Patient will perform toilet transfers with supervision/set-up  5.  Patient will perform all aspects of toileting with supervision/set-up.  6.  Patient will participate in upper extremity therapeutic exercise/activities with supervision/set-up for 8-10 minutes to increase strength/endurance for ADLs.    7.  Patient will utilize energy conservation techniques during functional activities with verbal cues.    PLOF: Pt lives alone in shed, 2 steps to enter, tub only, previously independent with ADLs.      Outcome: Progressing   OCCUPATIONAL THERAPY RE-EVALUATION    Patient: Delfin Shetty (68 y.o. male)  Date: 10/31/2024  Primary Diagnosis: Hypokalemia [E87.6]  Empyema lung (HCC) [J86.9]  Bullous emphysema (HCC) [J43.9]  Acute respiratory failure with hypoxemia [J96.01]  Sepsis due to pneumonia (HCC) [J18.9, A41.9]  Pneumonia of left lung due to infectious organism, unspecified part of lung [J18.9]  Large pleural effusion [J90]       Precautions: General Precautions, Fall Risk    ASSESSMENT :  Pt supine in bed upon arrival, agreeable to OT session. Emiliana supine > sit EOB, denies dizziness. AROM and strength have improved to be WFL. Pt able to simulate donning socks using crossed leg technique. SBA sit <> stand with RW for balance. Min A for functional mobility for leads management around room to simulate kitchen mobility. HR increasing into the 130s during functional  mobility. Pt agreeable to sitting in recliner, assisted with donning clean gown. Pt left seated with call bell near and all needs met, family present in room. Pt progressing well towards goals, continues to benefit from acute OT services to address deficits and optimize functional independence.     DEFICITS/IMPAIRMENTS:  Performance deficits / Impairments: Decreased functional mobility ;Decreased endurance;Decreased coordination;Decreased ADL status;Decreased balance    Patient will benefit from skilled intervention to address the above impairments.  Patient's rehabilitation potential/Prognosis: Fair.  Factors which may influence rehabilitation potential include:   []             None noted  []             Mental ability/status  []             Medical condition  [x]             Home/family situation and support systems  []             Safety awareness  []             Pain tolerance/management  []             Other:      PLAN :  Recommendations and Planned Interventions:   [x]               Self Care Training                  [x]      Therapeutic Activities  [x]               Functional Mobility Training   []      Cognitive Retraining  [x]               Therapeutic Exercises           [x]      Endurance Activities  [x]               Balance Training                    []      Neuromuscular Re-Education  []               Visual/Perceptual Training     [x]      Home Safety Training  [x]               Patient Education                   [x]      Family Training/Education  []               Other (comment):    Frequency/Duration: Patient will be followed by occupational therapy to address goals, 1-2 times per day/3-5 days per week to address goals.    Further Equipment Recommendations for Discharge: shower chair and rolling walker    [facility]: AM-PAC Inpatient Daily Activity Raw Score: 19    Current research shows that an AM-PAC score of 18 or greater is associated with a discharge to the patient's home setting.    This

## 2024-11-01 ENCOUNTER — APPOINTMENT (OUTPATIENT)
Facility: HOSPITAL | Age: 69
End: 2024-11-01
Payer: MEDICARE

## 2024-11-01 PROBLEM — J91.0 MALIGNANT PLEURAL EFFUSION: Status: ACTIVE | Noted: 2024-11-01

## 2024-11-01 LAB
ANION GAP SERPL CALC-SCNC: 6 MMOL/L (ref 3–18)
BUN SERPL-MCNC: 8 MG/DL (ref 7–18)
BUN/CREAT SERPL: 14 (ref 12–20)
CALCIUM SERPL-MCNC: 8.8 MG/DL (ref 8.5–10.1)
CHLORIDE SERPL-SCNC: 103 MMOL/L (ref 100–111)
CO2 SERPL-SCNC: 26 MMOL/L (ref 21–32)
CREAT SERPL-MCNC: 0.59 MG/DL (ref 0.6–1.3)
GLUCOSE SERPL-MCNC: 126 MG/DL (ref 74–99)
POTASSIUM SERPL-SCNC: 3.8 MMOL/L (ref 3.5–5.5)
SODIUM SERPL-SCNC: 135 MMOL/L (ref 136–145)

## 2024-11-01 PROCEDURE — 36415 COLL VENOUS BLD VENIPUNCTURE: CPT

## 2024-11-01 PROCEDURE — APPSS15 APP SPLIT SHARED TIME 0-15 MINUTES

## 2024-11-01 PROCEDURE — 94669 MECHANICAL CHEST WALL OSCILL: CPT

## 2024-11-01 PROCEDURE — 1100000003 HC PRIVATE W/ TELEMETRY

## 2024-11-01 PROCEDURE — 2580000003 HC RX 258: Performed by: PHYSICIAN ASSISTANT

## 2024-11-01 PROCEDURE — 6370000000 HC RX 637 (ALT 250 FOR IP): Performed by: PHYSICIAN ASSISTANT

## 2024-11-01 PROCEDURE — 6360000002 HC RX W HCPCS: Performed by: STUDENT IN AN ORGANIZED HEALTH CARE EDUCATION/TRAINING PROGRAM

## 2024-11-01 PROCEDURE — 80048 BASIC METABOLIC PNL TOTAL CA: CPT

## 2024-11-01 PROCEDURE — 6360000002 HC RX W HCPCS: Performed by: HEALTH CARE PROVIDER

## 2024-11-01 PROCEDURE — 6360000002 HC RX W HCPCS: Performed by: PHYSICIAN ASSISTANT

## 2024-11-01 PROCEDURE — 97116 GAIT TRAINING THERAPY: CPT

## 2024-11-01 PROCEDURE — 71045 X-RAY EXAM CHEST 1 VIEW: CPT

## 2024-11-01 PROCEDURE — 6370000000 HC RX 637 (ALT 250 FOR IP): Performed by: STUDENT IN AN ORGANIZED HEALTH CARE EDUCATION/TRAINING PROGRAM

## 2024-11-01 PROCEDURE — 2700000000 HC OXYGEN THERAPY PER DAY

## 2024-11-01 PROCEDURE — 99232 SBSQ HOSP IP/OBS MODERATE 35: CPT | Performed by: STUDENT IN AN ORGANIZED HEALTH CARE EDUCATION/TRAINING PROGRAM

## 2024-11-01 PROCEDURE — 94640 AIRWAY INHALATION TREATMENT: CPT

## 2024-11-01 PROCEDURE — 99233 SBSQ HOSP IP/OBS HIGH 50: CPT | Performed by: INTERNAL MEDICINE

## 2024-11-01 PROCEDURE — 97110 THERAPEUTIC EXERCISES: CPT

## 2024-11-01 PROCEDURE — 94761 N-INVAS EAR/PLS OXIMETRY MLT: CPT

## 2024-11-01 RX ORDER — OXYCODONE AND ACETAMINOPHEN 10; 325 MG/1; MG/1
1 TABLET ORAL EVERY 4 HOURS PRN
Status: DISCONTINUED | OUTPATIENT
Start: 2024-11-01 | End: 2024-11-16 | Stop reason: HOSPADM

## 2024-11-01 RX ADMIN — ATORVASTATIN CALCIUM 10 MG: 10 TABLET, FILM COATED ORAL at 21:24

## 2024-11-01 RX ADMIN — MORPHINE SULFATE 4 MG: 4 INJECTION, SOLUTION INTRAMUSCULAR; INTRAVENOUS at 04:03

## 2024-11-01 RX ADMIN — SODIUM CHLORIDE, PRESERVATIVE FREE 10 ML: 5 INJECTION INTRAVENOUS at 11:55

## 2024-11-01 RX ADMIN — THERA TABS 1 TABLET: TAB at 10:01

## 2024-11-01 RX ADMIN — ARFORMOTEROL TARTRATE 15 MCG: 15 SOLUTION RESPIRATORY (INHALATION) at 08:01

## 2024-11-01 RX ADMIN — MORPHINE SULFATE 4 MG: 4 INJECTION, SOLUTION INTRAMUSCULAR; INTRAVENOUS at 10:01

## 2024-11-01 RX ADMIN — ENOXAPARIN SODIUM 40 MG: 100 INJECTION SUBCUTANEOUS at 10:01

## 2024-11-01 RX ADMIN — OXYCODONE HYDROCHLORIDE AND ACETAMINOPHEN 1 TABLET: 10; 325 TABLET ORAL at 21:24

## 2024-11-01 RX ADMIN — IPRATROPIUM BROMIDE 0.5 MG: 0.5 SOLUTION RESPIRATORY (INHALATION) at 08:01

## 2024-11-01 RX ADMIN — BUDESONIDE 1 MG: 1 SUSPENSION RESPIRATORY (INHALATION) at 08:01

## 2024-11-01 RX ADMIN — IPRATROPIUM BROMIDE 0.5 MG: 0.5 SOLUTION RESPIRATORY (INHALATION) at 20:08

## 2024-11-01 RX ADMIN — IPRATROPIUM BROMIDE 0.5 MG: 0.5 SOLUTION RESPIRATORY (INHALATION) at 13:31

## 2024-11-01 RX ADMIN — ARFORMOTEROL TARTRATE 15 MCG: 15 SOLUTION RESPIRATORY (INHALATION) at 20:08

## 2024-11-01 RX ADMIN — SODIUM CHLORIDE, PRESERVATIVE FREE 10 ML: 5 INJECTION INTRAVENOUS at 21:24

## 2024-11-01 RX ADMIN — BUDESONIDE 1 MG: 1 SUSPENSION RESPIRATORY (INHALATION) at 20:08

## 2024-11-01 ASSESSMENT — PAIN DESCRIPTION - FREQUENCY
FREQUENCY: INTERMITTENT

## 2024-11-01 ASSESSMENT — PAIN DESCRIPTION - ORIENTATION
ORIENTATION: LEFT

## 2024-11-01 ASSESSMENT — PAIN SCALES - GENERAL
PAINLEVEL_OUTOF10: 8
PAINLEVEL_OUTOF10: 4
PAINLEVEL_OUTOF10: 5
PAINLEVEL_OUTOF10: 5
PAINLEVEL_OUTOF10: 8
PAINLEVEL_OUTOF10: 4
PAINLEVEL_OUTOF10: 8
PAINLEVEL_OUTOF10: 8
PAINLEVEL_OUTOF10: 7
PAINLEVEL_OUTOF10: 0
PAINLEVEL_OUTOF10: 0
PAINLEVEL_OUTOF10: 3

## 2024-11-01 ASSESSMENT — PAIN DESCRIPTION - LOCATION
LOCATION: CHEST

## 2024-11-01 ASSESSMENT — PAIN - FUNCTIONAL ASSESSMENT
PAIN_FUNCTIONAL_ASSESSMENT: ACTIVITIES ARE NOT PREVENTED
PAIN_FUNCTIONAL_ASSESSMENT: ACTIVITIES ARE NOT PREVENTED
PAIN_FUNCTIONAL_ASSESSMENT: PREVENTS OR INTERFERES SOME ACTIVE ACTIVITIES AND ADLS
PAIN_FUNCTIONAL_ASSESSMENT: ACTIVITIES ARE NOT PREVENTED
PAIN_FUNCTIONAL_ASSESSMENT: PREVENTS OR INTERFERES SOME ACTIVE ACTIVITIES AND ADLS

## 2024-11-01 ASSESSMENT — PAIN DESCRIPTION - DESCRIPTORS
DESCRIPTORS: SHARP
DESCRIPTORS: DISCOMFORT

## 2024-11-01 ASSESSMENT — PAIN DESCRIPTION - PAIN TYPE
TYPE: SURGICAL PAIN

## 2024-11-01 ASSESSMENT — PAIN DESCRIPTION - ONSET
ONSET: GRADUAL

## 2024-11-01 NOTE — PROGRESS NOTES
0730: Bedside and Verbal shift change report given to Jenniffer RN and Macarena RN  (oncoming nurse) by Luz Elena RN (offgoing nurse). Report included the following information Nurse Handoff Report, Adult Overview, Recent Results, Cardiac Rhythm ST, and Event Log.     1930: Bedside and Verbal shift change report given to Soo(oncoming nurse) by Jenniffer RAMIREZ (offgoing nurse). Report included the following information Nurse Handoff Report, Adult Overview, Recent Results, Cardiac Rhythm ST, and Event Log.

## 2024-11-01 NOTE — PROGRESS NOTES
4 Eyes Skin Assessment     NAME:  Delfin Shetty  YOB: 1955  MEDICAL RECORD NUMBER:  [id number]    The patient is being assessed for  Shift Handoff    I agree that at least one RN has performed a thorough Head to Toe Skin Assessment on the patient. ALL assessment sites listed below have been assessed.      Areas assessed by both nurses:    Head, Face, Ears, Shoulders, Back, Chest, Arms, Elbows, Hands, Sacrum. Buttock, Coccyx, Ischium, Legs. Feet and Heels, and Under Medical Devices         Does the Patient have a Wound? No noted wound(s)       Valentin Prevention initiated by RN: Yes  Wound Care Orders initiated by RN: No    Pressure Injury (Stage 3,4, Unstageable, DTI, NWPT, and Complex wounds) if present, place Wound referral order by RN under : No    New Ostomies, if present place, Ostomy referral order under : No     Nurse 1 eSignature: Electronically signed by Sierra Chung RN on 10/31/24 at 8:07 PM EDT    **SHARE this note so that the co-signing nurse can place an eSignature**    Nurse 2 eSignature: Electronically signed by Venice Hammer RN on 10/31/24 at 8:30 PM EDT

## 2024-11-01 NOTE — CARE COORDINATION
FERNY is looking for a LTC facility that can accept pending Medicaid. FERNY reached out to Hailey (Jemma) to see if any of her facilities has LTC bed placement.     Hailey replied she will call around and let FERNY know.     Donovan Paul, MSW     [phone]

## 2024-11-01 NOTE — PROGRESS NOTES
0800: 4 Eyes Skin Assessment     NAME:  Delfin Shetty  YOB: 1955  MEDICAL RECORD NUMBER:  [id number]    The patient is being assessed for  Shift Handoff    I agree that at least one RN has performed a thorough Head to Toe Skin Assessment on the patient. ALL assessment sites listed below have been assessed.      Areas assessed by both nurses:    Head, Face, Ears, Shoulders, Back, Chest, Arms, Elbows, Hands, Sacrum. Buttock, Coccyx, Ischium, and Legs. Feet and Heels        Does the Patient have a Wound? Yes wound(s) were present on assessment. LDA wound assessment was Initiated and completed by RN    Old removed Chest tube incision dressing intact  Valentin Prevention initiated by RN: Yes  Wound Care Orders initiated by RN: No    Pressure Injury (Stage 3,4, Unstageable, DTI, NWPT, and Complex wounds) if present, place Wound referral order by RN under : No    New Ostomies, if present place, Ostomy referral order under : No     Nurse 1 eSignature: Electronically signed by Macarena Ledesma RN on 11/1/24 at 8:06 AM EDT    **SHARE this note so that the co-signing nurse can place an eSignature**    Nurse 2 eSignature: Electronically signed by Venice Hammer RN on 11/1/24 at 8:13 AM EDT

## 2024-11-01 NOTE — PROGRESS NOTES
[facility] Hospitalist Group  Progress Note    Patient: Delfin Shetty Age: 68 y.o. : 1955 MR#: [id number] SSN: [id number]  Date/Time: 2024    Subjective:   Subjective   No acute events overnight, no new concerns or complaints, vitals stable.     Review of Systems   Constitutional: Negative for fever.      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  Hypokalemia  Cachexia  Thrombocytosis  Hypertension  Hyperlipidemia  Tobacco abuse  Eosinophilia  Squamous cell carcinoma, poorly differentiated    Plan  Monitoring off antibiotics    Imaging appears complete, has received MRI brain without signs of malignancy, Mediport in place, CT chest abdomen pelvis with all results in the chart    Wean oxygen as tolerated    Disposition: Expected location: C    Expected discharge date: 2024      Case discussed with:  [x]Patient  []Family  [x]Nursing  [x]Case Management  DVT Prophylaxis:  [x]Lovenox  []Hep SQ  [x]SCDs  []Coumadin   []On Heparin gtt   [] DOAC    Objective:   Objective:  General Appearance:  In no acute distress, not in pain, uncomfortable and ill-appearing.    Vital signs: (most recent): Blood pressure (!) 129/59, pulse (!) 110, temperature 98.5 °F (36.9 °C), temperature source Oral, resp. rate 22, height 1.702 m (5' 7.01\"), weight 57.7 kg (127 lb 3.3 oz), SpO2 94%.  No fever.    HEENT: Normal HEENT exam.    Lungs:  Normal respiratory rate and increased effort.  There are decreased breath sounds and rhonchi.    Heart: Tachycardia.  Regular rhythm.  S1 normal and S2 normal.  No murmur, gallop or friction rub.   Chest: Symmetric chest wall expansion.   Abdomen: Abdomen is soft and non-distended.  Bowel sounds are

## 2024-11-01 NOTE — PROGRESS NOTES
[facility]  Pulmonary, Critical Care, and Sleep Medicine    Name: Delfin Shetty MRN: [id number]   : 1955 Hospital: [facility]   Date: 2024        Pulmonary Medicine: Daily Progress Note    Admission Date:   10/23/2024  LOS: 9  MAR reviewed and pertinent medications noted or modified as needed    IMPRESSION:   Large L malignant pleural effusion with loculations and tension contralateral mediastinal shift. Concern for mesothelioma given appearance of pleura or metastatic lung cancer given mass like consolidation L apex. Cannot R/o pneumonia ?post obstructive with leukocytosis and resulting parapneumonic effusion  s/p chest tube 10/24- exudative fluid- lymphocytic and eosinophilic.  Chest tube removed 10/31  Not likely infected, cultures with no growth since 10/24, pH>7.2, Glucose >4, procal neg  Cytology negative for malignant cells  Pathology of left pleural positive for SCC  Flu and COVID PCR negative  S/p   intrapleural lytic therapy since 10/26, a total of 6.3L output drained.   Overall improvement in effusion and resolution of mediastinal shift  POORLY DIFFERENTIATED CARCINOMA OF LEFT LUNG, FAVOR SQUAMOUS CELL CARCINOMA from left pleural biopsy of 10/24. Staining for PD-L1 expression pending  Enlarged mediastinal lymph node, 1.9 cm left adrenal lesion suggestive of additional sites of metastatic disease.   Indeterminate 7 mm left hepatic lobe lesion and right iliac bone sclerotic focus noted.   Significant irregular pleural thickening throughout left hemithorax seen as Mass-like consolidation on CXR in the lateral left apex, likely malignant  Acute hypoxic respiratory failure due to combination of passive atelectasis, consolidation, COPD. Improving with lung re-expansion and less tachypnea and less oxygen requirement  Atelectasis, left lung, compressive vs obstructive, with elevated right hemidiaphragm, improving  Small left pneumothorax ex vacuo in

## 2024-11-01 NOTE — PLAN OF CARE
SUMMARY:   Critical Behavior:  Orientation  Overall Orientation Status: Within Functional Limits  Orientation Level: Oriented X4  Cognition  Overall Cognitive Status: WFL  Arousal/Alertness: Appears intact  Following Commands: Appears intact  Attention Span: Appears intact  Memory: Appears intact  Safety Judgement: Appears intact  Problem Solving: Appears intact    Functional Mobility Training:  Bed Mobility:  Bed Mobility Training  Bed Mobility Training: Yes  Overall Level of Assistance: Modified independent  Supine to Sit: Modified independent  Sit to Supine: Modified independent  Scooting: Modified independent  Transfers:  Transfer Training  Transfer Training: Yes  Overall Level of Assistance: Stand-by assistance  Interventions: Verbal cues  Sit to Stand: Stand-by assistance  Stand to Sit: Stand-by assistance  Balance:  Balance  Sitting: Intact  Standing: Intact;With support  Standing - Static: Good  Standing - Dynamic: Fair   Wheelchair Mobility:   Wheelchair Management  Wheelchair Management: No  Ambulation/Gait Training:     Gait  Gait Training: Yes  Left Side Weight Bearing: As tolerated  Right Side Weight Bearing: As tolerated  Overall Level of Assistance: Stand-by assistance  Distance (ft): 30 Feet  Assistive Device: Walker, rolling  Interventions: Verbal cues  Speed/April: Shuffled;Slow  Step Length: Left shortened;Right shortened  Gait Abnormalities: Decreased step clearance     Therapeutic Exercises:     EXERCISE   Sets   Reps   Active Active Assist   Passive Self ROM   Comments   Ankle Pumps 1 20  [x] [] [] []    Quad Sets/Glut Sets 1 10  [x] [] [] [] Hold for 5 secs   Hamstring Sets   [] [] [] []    Short Arc Quads 1 10 [x] [] [] []    Heel Slides 1 10 [x] [] [] []    Straight Leg Raises 1 10 [x] [] [] []    Hip Add   [] [] [] [] Hold for 5 secs, w/ pillow squeeze   Long Arc Quads 1 10 [x] [] [] []    Seated Marching   [] [] [] []    Standing Marching   [] [] [] []       [] [] [] []         Pain:  Intensity Pre-treatment: 0/10   Intensity Post-treatment: 0/10      Activity Tolerance:   Activity Tolerance: Patient tolerated treatment well  Please refer to the flowsheet for vital signs taken during this treatment.  After treatment:   [] Patient left in no apparent distress sitting up in chair  [x] Patient left in no apparent distress in bed  [x] Call bell left within reach  [x] Nursing notified  [] Caregiver present  [x] Bed alarm activated  [] SCDs applied      COMMUNICATION/EDUCATION:   Patient Education  Education Given To: Patient  Education Provided: Energy Conservation  Education Provided Comments: Education on importance of PLB to assist with respiratory fuction and aerobic capacity  Education Method: Verbal;Demonstration;Teach Back  Barriers to Learning: None  Education Outcome: Verbalized understanding;Demonstrated understanding;Continued education needed      Zoe Mcgee PTA  Minutes: 23

## 2024-11-01 NOTE — PLAN OF CARE
Problem: Safety - Adult  Goal: Free from fall injury  11/1/2024 0158 by Venice Hammer RN  Note: Patient will be assisted to go to the bathroom, educated on the use of his call light to summon for help.     10/31/2024 1511 by Sierra Chung RN  Outcome: Progressing  10/31/2024 1509 by Sierra Chung RN  Outcome: Progressing     Problem: Skin/Tissue Integrity  Goal: Absence of new skin breakdown  Description: 1.  Monitor for areas of redness and/or skin breakdown  2.  Assess vascular access sites hourly  3.  Every 4-6 hours minimum:  Change oxygen saturation probe site  4.  Every 4-6 hours:  If on nasal continuous positive airway pressure, respiratory therapy assess nares and determine need for appliance change or resting period.  11/1/2024 0158 by Venice Hammer RN  Note: Patient will not experience any new skin breakdown.   10/31/2024 1511 by Sierra Chung RN  Outcome: Progressing  10/31/2024 1509 by Sierra Chung RN  Outcome: Progressing     Problem: Respiratory - Adult  Goal: Achieves optimal ventilation and oxygenation  11/1/2024 0158 by Venice Hammer RN  Flowsheets (Taken 11/1/2024 0158)  Achieves optimal ventilation and oxygenation: Oxygen supplementation based on oxygen saturation or arterial blood gases  10/31/2024 1511 by Sierra Chung RN  Outcome: Progressing  10/31/2024 1509 by Sierra Chung RN  Outcome: Progressing     Problem: Pain  Goal: Verbalizes/displays adequate comfort level or baseline comfort level  Description: Patient's pain will be assessed and reassessed at least 3 times per shift.  11/1/2024 0158 by Venice Hammer RN  Note: Patient will report pain when it develops and will be medicated per MAR to ensure comfort at pain goal.   10/31/2024 1511 by Sierra Chung RN  Outcome: Progressing  10/31/2024 1509 by Sierra Chung RN  Outcome: Progressing  Flowsheets (Taken 10/31/2024 0418 by Alona Suarez RN)  Verbalizes/displays adequate comfort level or baseline comfort level:    Encourage patient to monitor pain and request assistance   Assess pain using appropriate pain scale  Note: Patient will rate pain using appropriate pain scale.      Problem: Discharge Planning  Goal: Discharge to home or other facility with appropriate resources  11/1/2024 0158 by Venice Hammer RN  Note: Patient anticipating discharge home.   10/31/2024 1511 by Sierra Chung, RN  Outcome: Progressing  10/31/2024 1509 by Sierra Chung, RN  Outcome: Progressing  Note: Patient chest tube will be removed per MD.

## 2024-11-02 LAB
ANION GAP SERPL CALC-SCNC: 6 MMOL/L (ref 3–18)
BUN SERPL-MCNC: 8 MG/DL (ref 7–18)
BUN/CREAT SERPL: 16 (ref 12–20)
CALCIUM SERPL-MCNC: 9 MG/DL (ref 8.5–10.1)
CHLORIDE SERPL-SCNC: 104 MMOL/L (ref 100–111)
CO2 SERPL-SCNC: 27 MMOL/L (ref 21–32)
CREAT SERPL-MCNC: 0.51 MG/DL (ref 0.6–1.3)
GLUCOSE SERPL-MCNC: 88 MG/DL (ref 74–99)
POTASSIUM SERPL-SCNC: 4.1 MMOL/L (ref 3.5–5.5)
SODIUM SERPL-SCNC: 137 MMOL/L (ref 136–145)

## 2024-11-02 PROCEDURE — 94640 AIRWAY INHALATION TREATMENT: CPT

## 2024-11-02 PROCEDURE — 1100000003 HC PRIVATE W/ TELEMETRY

## 2024-11-02 PROCEDURE — 6360000002 HC RX W HCPCS: Performed by: STUDENT IN AN ORGANIZED HEALTH CARE EDUCATION/TRAINING PROGRAM

## 2024-11-02 PROCEDURE — 99232 SBSQ HOSP IP/OBS MODERATE 35: CPT | Performed by: STUDENT IN AN ORGANIZED HEALTH CARE EDUCATION/TRAINING PROGRAM

## 2024-11-02 PROCEDURE — 6370000000 HC RX 637 (ALT 250 FOR IP): Performed by: STUDENT IN AN ORGANIZED HEALTH CARE EDUCATION/TRAINING PROGRAM

## 2024-11-02 PROCEDURE — 6360000002 HC RX W HCPCS: Performed by: PHYSICIAN ASSISTANT

## 2024-11-02 PROCEDURE — 36415 COLL VENOUS BLD VENIPUNCTURE: CPT

## 2024-11-02 PROCEDURE — 94761 N-INVAS EAR/PLS OXIMETRY MLT: CPT

## 2024-11-02 PROCEDURE — 2700000000 HC OXYGEN THERAPY PER DAY

## 2024-11-02 PROCEDURE — 80048 BASIC METABOLIC PNL TOTAL CA: CPT

## 2024-11-02 PROCEDURE — 94669 MECHANICAL CHEST WALL OSCILL: CPT

## 2024-11-02 PROCEDURE — 6370000000 HC RX 637 (ALT 250 FOR IP): Performed by: PHYSICIAN ASSISTANT

## 2024-11-02 PROCEDURE — 2580000003 HC RX 258: Performed by: PHYSICIAN ASSISTANT

## 2024-11-02 RX ADMIN — ENOXAPARIN SODIUM 40 MG: 100 INJECTION SUBCUTANEOUS at 08:48

## 2024-11-02 RX ADMIN — ARFORMOTEROL TARTRATE 15 MCG: 15 SOLUTION RESPIRATORY (INHALATION) at 08:50

## 2024-11-02 RX ADMIN — ATORVASTATIN CALCIUM 10 MG: 10 TABLET, FILM COATED ORAL at 21:11

## 2024-11-02 RX ADMIN — IPRATROPIUM BROMIDE 0.5 MG: 0.5 SOLUTION RESPIRATORY (INHALATION) at 20:42

## 2024-11-02 RX ADMIN — ARFORMOTEROL TARTRATE 15 MCG: 15 SOLUTION RESPIRATORY (INHALATION) at 20:42

## 2024-11-02 RX ADMIN — IPRATROPIUM BROMIDE 0.5 MG: 0.5 SOLUTION RESPIRATORY (INHALATION) at 08:50

## 2024-11-02 RX ADMIN — OXYCODONE HYDROCHLORIDE AND ACETAMINOPHEN 1 TABLET: 10; 325 TABLET ORAL at 21:11

## 2024-11-02 RX ADMIN — BUDESONIDE 1 MG: 1 SUSPENSION RESPIRATORY (INHALATION) at 08:50

## 2024-11-02 RX ADMIN — BUDESONIDE 1 MG: 1 SUSPENSION RESPIRATORY (INHALATION) at 20:42

## 2024-11-02 RX ADMIN — THERA TABS 1 TABLET: TAB at 08:48

## 2024-11-02 RX ADMIN — SODIUM CHLORIDE, PRESERVATIVE FREE 10 ML: 5 INJECTION INTRAVENOUS at 08:51

## 2024-11-02 RX ADMIN — SODIUM CHLORIDE, PRESERVATIVE FREE 10 ML: 5 INJECTION INTRAVENOUS at 21:12

## 2024-11-02 RX ADMIN — IPRATROPIUM BROMIDE 0.5 MG: 0.5 SOLUTION RESPIRATORY (INHALATION) at 15:10

## 2024-11-02 RX ADMIN — OXYCODONE HYDROCHLORIDE AND ACETAMINOPHEN 1 TABLET: 10; 325 TABLET ORAL at 08:48

## 2024-11-02 ASSESSMENT — PAIN DESCRIPTION - PAIN TYPE
TYPE: SURGICAL PAIN
TYPE: SURGICAL PAIN

## 2024-11-02 ASSESSMENT — PAIN DESCRIPTION - DESCRIPTORS
DESCRIPTORS: ACHING;STABBING
DESCRIPTORS: DISCOMFORT

## 2024-11-02 ASSESSMENT — PAIN SCALES - WONG BAKER: WONGBAKER_NUMERICALRESPONSE: NO HURT

## 2024-11-02 ASSESSMENT — PAIN DESCRIPTION - FREQUENCY
FREQUENCY: INTERMITTENT
FREQUENCY: INTERMITTENT

## 2024-11-02 ASSESSMENT — PAIN - FUNCTIONAL ASSESSMENT
PAIN_FUNCTIONAL_ASSESSMENT: ACTIVITIES ARE NOT PREVENTED
PAIN_FUNCTIONAL_ASSESSMENT: ACTIVITIES ARE NOT PREVENTED

## 2024-11-02 ASSESSMENT — PAIN SCALES - GENERAL
PAINLEVEL_OUTOF10: 0
PAINLEVEL_OUTOF10: 8
PAINLEVEL_OUTOF10: 7
PAINLEVEL_OUTOF10: 0
PAINLEVEL_OUTOF10: 0
PAINLEVEL_OUTOF10: 8
PAINLEVEL_OUTOF10: 9
PAINLEVEL_OUTOF10: 0

## 2024-11-02 ASSESSMENT — PAIN DESCRIPTION - ORIENTATION
ORIENTATION: LEFT
ORIENTATION: LEFT

## 2024-11-02 ASSESSMENT — PAIN DESCRIPTION - ONSET
ONSET: GRADUAL
ONSET: GRADUAL

## 2024-11-02 ASSESSMENT — PAIN DESCRIPTION - LOCATION
LOCATION: CHEST
LOCATION: FLANK

## 2024-11-02 NOTE — PROGRESS NOTES
0741 Bedside shift report given to Hayley Maria RN from Soo RAMIREZ. Report including the following information SBAR, Kardex, recent results, MAR, intake/output and cardiac rhythm ST.     0848 Pt shift assessment done, pt complains of pain 10/10. Pt medicated per MAR    0948 Pain reassessment done, pain scored 7/10. Pt helped to chair and washed up.    1115 Bedside shift report given to Tracy RAMIREZ by Hayley RAMIREZ, all question and concerns answered.

## 2024-11-02 NOTE — PLAN OF CARE
Problem: Pain  Goal: Verbalizes/displays adequate comfort level or baseline comfort level  Description: Monitoring pain level and giving pain medication as needed  Outcome: Progressing  Flowsheets (Taken 11/2/2024 1200)  Verbalizes/displays adequate comfort level or baseline comfort level:   Encourage patient to monitor pain and request assistance   Assess pain using appropriate pain scale   Administer analgesics based on type and severity of pain and evaluate response     Problem: Safety - Adult  Goal: Free from fall injury  Description: Gripper socks, call bell in reach  Outcome: Progressing     Problem: Respiratory - Adult  Goal: Achieves optimal ventilation and oxygenation  Outcome: Progressing  Flowsheets (Taken 11/2/2024 1200)  Achieves optimal ventilation and oxygenation:   Assess for changes in respiratory status   Assess for changes in mentation and behavior   Position to facilitate oxygenation and minimize respiratory effort   Oxygen supplementation based on oxygen saturation or arterial blood gases

## 2024-11-02 NOTE — PROGRESS NOTES
4 Eyes Skin Assessment     NAME:  Delfin Shetty  YOB: 1955  MEDICAL RECORD NUMBER:  [id number]    The patient is being assessed for  Shift Handoff    I agree that at least one RN has performed a thorough Head to Toe Skin Assessment on the patient. ALL assessment sites listed below have been assessed.      Areas assessed by both nurses:    Head, Face, Ears, Shoulders, Back, Chest, Arms, Elbows, Hands, Sacrum. Buttock, Coccyx, Ischium, Legs. Feet and Heels, and Under Medical Devices         Does the Patient have a Wound? No noted wound(s)       Valentin Prevention initiated by RN: No  Wound Care Orders initiated by RN: No    Pressure Injury (Stage 3,4, Unstageable, DTI, NWPT, and Complex wounds) if present, place Wound referral order by RN under : No    New Ostomies, if present place, Ostomy referral order under : No     Nurse 1 eSignature: Electronically signed by Hayley Maria RN on 11/2/24 at 11:15 AM EDT    **SHARE this note so that the co-signing nurse can place an eSignature**    Nurse 2 eSignature: {Esignature:[id number]}

## 2024-11-02 NOTE — PROGRESS NOTES
4 Eyes Skin Assessment     NAME:  Delfin Shetty  YOB: 1955  MEDICAL RECORD NUMBER:  [id number]    The patient is being assessed for  Shift Handoff    I agree that at least one RN has performed a thorough Head to Toe Skin Assessment on the patient. ALL assessment sites listed below have been assessed.      Areas assessed by both nurses:    Head, Face, Ears, Shoulders, Back, Chest, Arms, Elbows, Hands, Sacrum. Buttock, Coccyx, Ischium, Legs. Feet and Heels, and Under Medical Devices         Does the Patient have a Wound? Yes wound(s) were present on assessment. LDA wound assessment was Initiated and completed by RN       Valentin Prevention initiated by RN: Yes  Wound Care Orders initiated by RN: No    Pressure Injury (Stage 3,4, Unstageable, DTI, NWPT, and Complex wounds) if present, place Wound referral order by RN under : No    New Ostomies, if present place, Ostomy referral order under : No     Nurse 1 eSignature: Electronically signed by Soo Oneal RN on 11/2/24 at 6:24 AM EDT    **SHARE this note so that the co-signing nurse can place an eSignature**    Nurse 2 eSignature: Electronically signed by Hayley Maria RN on 11/2/24 at 7:45 PM EDT

## 2024-11-02 NOTE — PLAN OF CARE
Problem: Discharge Planning  Goal: Discharge to home or other facility with appropriate resources  Outcome: Progressing  Flowsheets (Taken 11/2/2024 0250)  Discharge to home or other facility with appropriate resources:   Identify barriers to discharge with patient and caregiver   Identify discharge learning needs (meds, wound care, etc)  Note: Interventions: Patient needs and barriers will be addressed prior to discharge, patient will know if he will transition to LTAC at discharge or to home with [facility] services, Patient learning needs identified prior to discharge     Problem: Pain  Goal: Verbalizes/displays adequate comfort level or baseline comfort level  Description: Patient's pain will be assessed and reassessed at least 3 times per shift.  Outcome: Progressing  Flowsheets (Taken 11/2/2024 0250)  Verbalizes/displays adequate comfort level or baseline comfort level:   Encourage patient to monitor pain and request assistance   Administer analgesics based on type and severity of pain and evaluate response   Assess pain using appropriate pain scale  Note: Interventions: Patient will request pain medications based on pain scale as appropriate, patient pain will improve below pain goal or be at pain goal prior to discharge, Patient pain evaluated for satisfaction prior to end of shift      Problem: Safety - Adult  Goal: Free from fall injury  Outcome: Progressing  Flowsheets (Taken 11/2/2024 0250)  Free From Fall Injury:   Instruct family/caregiver on patient safety   Based on caregiver fall risk screen, instruct family/caregiver to ask for assistance with transferring infant if caregiver noted to have fall risk factors  Note: Intervention: patient understands to call for assistance prior to getting out of bed by end of shift, Patient fall risk screening evaluated every shift to prevent future falls      Problem: Skin/Tissue Integrity  Goal: Absence of new skin breakdown  Description: 1.  Monitor for areas of  redness and/or skin breakdown  2.  Assess vascular access sites hourly  3.  Every 4-6 hours minimum:  Change oxygen saturation probe site  4.  Every 4-6 hours:  If on nasal continuous positive airway pressure, respiratory therapy assess nares and determine need for appliance change or resting period.  Outcome: Progressing  Note: Intervention: Patient turns every 2 hours to prevent skin breakdown during shift      Problem: Respiratory - Adult  Goal: Achieves optimal ventilation and oxygenation  Outcome: Progressing  Flowsheets (Taken 11/2/2024 0250)  Achieves optimal ventilation and oxygenation:   Assess for changes in respiratory status   Position to facilitate oxygenation and minimize respiratory effort   Assess the need for suctioning and aspirate as needed   Respiratory therapy support as indicated   Oxygen supplementation based on oxygen saturation or arterial blood gases   Assess and instruct to report shortness of breath or any respiratory difficulty   Assess for changes in mentation and behavior  Note: Intervention: Patient mental status assessed every shift to ensure proper oxygenation, Patient oxygen status assessed every 4 hours and as needed based on patient behavior, RT will assist patient in weaning oxygen down if possible, Patient will report SOB if he notes it during each shift      Problem: Skin/Tissue Integrity - Adult  Goal: Skin integrity remains intact  Outcome: Progressing  Flowsheets (Taken 10/31/2024 0800 by Sierra Chung, RN)  Skin Integrity Remains Intact:   Monitor for areas of redness and/or skin breakdown   Assess vascular access sites hourly  Note: Interventions: Skin will remain intact without skinbreakdown every shift, Patient skin assessed every shift to prevent skin breakdown and vascular access      Problem: Skin/Tissue Integrity - Adult  Goal: Incisions, wounds, or drain sites healing without S/S of infection  Outcome: Progressing  Flowsheets (Taken 11/2/2024 0250)  Incisions,

## 2024-11-02 NOTE — PROGRESS NOTES
4 Eyes Skin Assessment     NAME:  Delfin Shetty  YOB: 1955  MEDICAL RECORD NUMBER:  [id number]    The patient is being assessed for  Shift Handoff    I agree that at least one RN has performed a thorough Head to Toe Skin Assessment on the patient. ALL assessment sites listed below have been assessed.      Areas assessed by both nurses:    Head, Face, Ears, Shoulders, Back, Chest, Arms, Elbows, Hands, Sacrum. Buttock, Coccyx, Ischium, Legs. Feet and Heels, and Under Medical Devices         Does the Patient have a Wound? No noted wound(s)       Valentin Prevention initiated by RN: Yes  Wound Care Orders initiated by RN: No    Pressure Injury (Stage 3,4, Unstageable, DTI, NWPT, and Complex wounds) if present, place Wound referral order by RN under : No    New Ostomies, if present place, Ostomy referral order under : No     Nurse 1 eSignature: Electronically signed by Tracy Contreras RN on 11/2/24 at 3:55 PM EDT    **SHARE this note so that the co-signing nurse can place an eSignature**    Nurse 2 eSignature: Electronically signed by Hayley Maria RN on 11/2/24 at 3:55 PM EDT

## 2024-11-02 NOTE — PROGRESS NOTES
2000: 4 Eyes Skin Assessment     NAME:  Delfin Shetty  YOB: 1955  MEDICAL RECORD NUMBER:  [id number]    The patient is being assessed for  Shift Handoff    I agree that at least one RN has performed a thorough Head to Toe Skin Assessment on the patient. ALL assessment sites listed below have been assessed.      Areas assessed by both nurses:    Head, Face, Ears, Shoulders, Back, Chest, Arms, Elbows, Hands, Sacrum. Buttock, Coccyx, Ischium, and Legs. Feet and Heels        Does the Patient have a Wound? Yes wound(s) were present on assessment. LDA wound assessment was Initiated and completed by RN     Chest tube removal site incision, dressing present  Valentin Prevention initiated by RN: Yes  Wound Care Orders initiated by RN: No    Pressure Injury (Stage 3,4, Unstageable, DTI, NWPT, and Complex wounds) if present, place Wound referral order by RN under : No    New Ostomies, if present place, Ostomy referral order under : No     Nurse 1 eSignature: Electronically signed by Macarena Ledesma RN on 11/1/24 at 8:06 PM EDT    **SHARE this note so that the co-signing nurse can place an eSignature**    Nurse 2 eSignature: Electronically signed by Soo Oneal RN on 11/2/24 at 3:04 AM EDT

## 2024-11-02 NOTE — PROGRESS NOTES
[facility]  Progress Note    Patient: Delfin Shetty Age: 68 y.o. : 1955 MR#: [id number] SSN: [id number]  Date/Time: 2024    Subjective:   Subjective   No acute events overnight, no new concerns or complaints, vitals stable.   Aware we are looking for placement.     Review of Systems   Constitutional: Negative for fever.      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  Hypokalemia  Cachexia  Thrombocytosis  Hypertension  Hyperlipidemia  Tobacco abuse  Eosinophilia  Squamous cell carcinoma, poorly differentiated    Plan  Monitoring off antibiotics    Imaging appears complete, has received MRI brain without signs of malignancy, Mediport in place, CT chest abdomen pelvis with all results in the chart    Wean oxygen as tolerated    Disposition: Expected location: C    Expected discharge date: 2024      Case discussed with:  [x]Patient  []Family  [x]Nursing  [x]Case Management  DVT Prophylaxis:  [x]Lovenox  []Hep SQ  [x]SCDs  []Coumadin   []On Heparin gtt   [] DOAC    Objective:   Objective:  General Appearance:  In no acute distress, not in pain, uncomfortable and ill-appearing.    Vital signs: (most recent): Blood pressure (!) 111/55, pulse (!) 124, temperature 99 °F (37.2 °C), temperature source Oral, resp. rate 24, height 1.702 m (5' 7.01\"), weight 57.7 kg (127 lb 3.3 oz), SpO2 92%.  No fever.    HEENT: Normal HEENT exam.    Lungs:  Normal respiratory rate and increased effort.  There are decreased breath sounds and rhonchi.    Heart: Tachycardia.  Regular rhythm.  S1 normal and S2 normal.  No murmur, gallop or friction rub.   Chest: Symmetric chest wall expansion.   Abdomen: Abdomen is soft and

## 2024-11-03 LAB
BASOPHILS # BLD: 0 K/UL (ref 0–0.1)
BASOPHILS NFR BLD: 0 % (ref 0–2)
DIFFERENTIAL METHOD BLD: ABNORMAL
EOSINOPHIL # BLD: 5.7 K/UL (ref 0–0.4)
EOSINOPHIL NFR BLD: 25 % (ref 0–5)
ERYTHROCYTE [DISTWIDTH] IN BLOOD BY AUTOMATED COUNT: 13.2 % (ref 11.6–14.5)
HCT VFR BLD AUTO: 33.3 % (ref 36–48)
HGB BLD-MCNC: 10.3 G/DL (ref 13–16)
IMM GRANULOCYTES # BLD AUTO: 0 K/UL
IMM GRANULOCYTES NFR BLD AUTO: 0 %
LYMPHOCYTES # BLD: 3.8 K/UL (ref 0.9–3.6)
LYMPHOCYTES NFR BLD: 17 % (ref 21–52)
MCH RBC QN AUTO: 28.1 PG (ref 24–34)
MCHC RBC AUTO-ENTMCNC: 30.9 G/DL (ref 31–37)
MCV RBC AUTO: 90.7 FL (ref 78–100)
MONOCYTES # BLD: 0.7 K/UL (ref 0.05–1.2)
MONOCYTES NFR BLD: 3 % (ref 3–10)
NEUTS SEG # BLD: 12.4 K/UL (ref 1.8–8)
NEUTS SEG NFR BLD: 55 % (ref 40–73)
NRBC # BLD: 0 K/UL (ref 0–0.01)
NRBC BLD-RTO: 0 PER 100 WBC
PLATELET # BLD AUTO: 541 K/UL (ref 135–420)
PLATELET COMMENT: ABNORMAL
PMV BLD AUTO: 9.7 FL (ref 9.2–11.8)
RBC # BLD AUTO: 3.67 M/UL (ref 4.35–5.65)
RBC MORPH BLD: ABNORMAL
WBC # BLD AUTO: 22.6 K/UL (ref 4.6–13.2)

## 2024-11-03 PROCEDURE — 6370000000 HC RX 637 (ALT 250 FOR IP): Performed by: PHYSICIAN ASSISTANT

## 2024-11-03 PROCEDURE — 6360000002 HC RX W HCPCS: Performed by: STUDENT IN AN ORGANIZED HEALTH CARE EDUCATION/TRAINING PROGRAM

## 2024-11-03 PROCEDURE — 36415 COLL VENOUS BLD VENIPUNCTURE: CPT

## 2024-11-03 PROCEDURE — 2580000003 HC RX 258: Performed by: PHYSICIAN ASSISTANT

## 2024-11-03 PROCEDURE — 6370000000 HC RX 637 (ALT 250 FOR IP): Performed by: STUDENT IN AN ORGANIZED HEALTH CARE EDUCATION/TRAINING PROGRAM

## 2024-11-03 PROCEDURE — 94640 AIRWAY INHALATION TREATMENT: CPT

## 2024-11-03 PROCEDURE — 99232 SBSQ HOSP IP/OBS MODERATE 35: CPT | Performed by: STUDENT IN AN ORGANIZED HEALTH CARE EDUCATION/TRAINING PROGRAM

## 2024-11-03 PROCEDURE — 6360000002 HC RX W HCPCS: Performed by: PHYSICIAN ASSISTANT

## 2024-11-03 PROCEDURE — 94669 MECHANICAL CHEST WALL OSCILL: CPT

## 2024-11-03 PROCEDURE — 85025 COMPLETE CBC W/AUTO DIFF WBC: CPT

## 2024-11-03 PROCEDURE — 2700000000 HC OXYGEN THERAPY PER DAY

## 2024-11-03 PROCEDURE — 1100000003 HC PRIVATE W/ TELEMETRY

## 2024-11-03 PROCEDURE — 94760 N-INVAS EAR/PLS OXIMETRY 1: CPT

## 2024-11-03 RX ADMIN — ATORVASTATIN CALCIUM 10 MG: 10 TABLET, FILM COATED ORAL at 20:55

## 2024-11-03 RX ADMIN — SODIUM CHLORIDE, PRESERVATIVE FREE 10 ML: 5 INJECTION INTRAVENOUS at 08:59

## 2024-11-03 RX ADMIN — BUDESONIDE 1 MG: 1 SUSPENSION RESPIRATORY (INHALATION) at 20:21

## 2024-11-03 RX ADMIN — IPRATROPIUM BROMIDE 0.5 MG: 0.5 SOLUTION RESPIRATORY (INHALATION) at 20:21

## 2024-11-03 RX ADMIN — IPRATROPIUM BROMIDE 0.5 MG: 0.5 SOLUTION RESPIRATORY (INHALATION) at 16:30

## 2024-11-03 RX ADMIN — OXYCODONE HYDROCHLORIDE AND ACETAMINOPHEN 1 TABLET: 10; 325 TABLET ORAL at 06:24

## 2024-11-03 RX ADMIN — OXYCODONE HYDROCHLORIDE AND ACETAMINOPHEN 1 TABLET: 10; 325 TABLET ORAL at 17:13

## 2024-11-03 RX ADMIN — ARFORMOTEROL TARTRATE 15 MCG: 15 SOLUTION RESPIRATORY (INHALATION) at 20:21

## 2024-11-03 RX ADMIN — ARFORMOTEROL TARTRATE 15 MCG: 15 SOLUTION RESPIRATORY (INHALATION) at 09:07

## 2024-11-03 RX ADMIN — SODIUM CHLORIDE, PRESERVATIVE FREE 10 ML: 5 INJECTION INTRAVENOUS at 20:56

## 2024-11-03 RX ADMIN — BUDESONIDE 1 MG: 1 SUSPENSION RESPIRATORY (INHALATION) at 09:07

## 2024-11-03 RX ADMIN — IPRATROPIUM BROMIDE 0.5 MG: 0.5 SOLUTION RESPIRATORY (INHALATION) at 09:07

## 2024-11-03 RX ADMIN — ENOXAPARIN SODIUM 40 MG: 100 INJECTION SUBCUTANEOUS at 08:59

## 2024-11-03 RX ADMIN — THERA TABS 1 TABLET: TAB at 08:59

## 2024-11-03 ASSESSMENT — PAIN DESCRIPTION - LOCATION
LOCATION: CHEST
LOCATION: CHEST

## 2024-11-03 ASSESSMENT — PAIN DESCRIPTION - ORIENTATION
ORIENTATION: LEFT
ORIENTATION: LEFT

## 2024-11-03 ASSESSMENT — PAIN SCALES - GENERAL
PAINLEVEL_OUTOF10: 0
PAINLEVEL_OUTOF10: 0
PAINLEVEL_OUTOF10: 4
PAINLEVEL_OUTOF10: 8
PAINLEVEL_OUTOF10: 0
PAINLEVEL_OUTOF10: 8

## 2024-11-03 ASSESSMENT — PAIN DESCRIPTION - DESCRIPTORS
DESCRIPTORS: DISCOMFORT
DESCRIPTORS: SHARP

## 2024-11-03 ASSESSMENT — PAIN - FUNCTIONAL ASSESSMENT
PAIN_FUNCTIONAL_ASSESSMENT: ACTIVITIES ARE NOT PREVENTED
PAIN_FUNCTIONAL_ASSESSMENT: PREVENTS OR INTERFERES SOME ACTIVE ACTIVITIES AND ADLS

## 2024-11-03 ASSESSMENT — PAIN DESCRIPTION - ONSET: ONSET: GRADUAL

## 2024-11-03 ASSESSMENT — PAIN DESCRIPTION - PAIN TYPE: TYPE: ACUTE PAIN

## 2024-11-03 ASSESSMENT — PAIN DESCRIPTION - FREQUENCY: FREQUENCY: INTERMITTENT

## 2024-11-03 NOTE — PROGRESS NOTES
[facility]  Progress Note  Date:11/3/2024       Room:[facility]  Patient Name:Delfin Shetty     YOB: 1955     Age:68 y.o.        Subjective    Subjective:  Symptoms:  Stable.  No shortness of breath or chest pain.    Diet:  Adequate intake.    Activity level: Normal.    Pain:  He reports no pain.       Review of Systems   Respiratory:  Negative for shortness of breath.    Cardiovascular:  Negative for chest pain.       No acute events overnight.  No complaints. Good appeitite.    Disposition: Medically stable for discharge. Pending LTC.    Objective         Vitals Last 24 Hours:  TEMPERATURE:  Temp  Av.5 °F (36.9 °C)  Min: 97.7 °F (36.5 °C)  Max: 98.9 °F (37.2 °C)  RESPIRATIONS RANGE: Resp  Av.8  Min: 16  Max: 33  PULSE OXIMETRY RANGE: SpO2  Av.4 %  Min: 93 %  Max: 100 %  PULSE RANGE: Pulse  Av.9  Min: 107  Max: 125  BLOOD PRESSURE RANGE: Systolic (24hrs), Av , Min:104 , Max:134     ; Diastolic (24hrs), Av, Min:57, Max:76      I/O (24Hr):    Intake/Output Summary (Last 24 hours) at 11/3/2024 0919  Last data filed at 2024 1544  Gross per 24 hour   Intake 240 ml   Output 225 ml   Net 15 ml     Objective:  General Appearance:  Comfortable.    Vital signs: (most recent): Blood pressure 126/63, pulse (!) 118, temperature 99 °F (37.2 °C), temperature source Oral, resp. rate 30, height 1.702 m (5' 7.01\"), weight 57.7 kg (127 lb 3.3 oz), SpO2 94%.    Output: Producing urine.    HEENT: Normal HEENT exam.    Lungs:  Normal effort and normal respiratory rate.  Breath sounds clear to auscultation.  (On nasal cannula)  Heart: Normal rate.  Regular rhythm.    Chest: (Mediport in place)  Abdomen: Abdomen is soft and flat.  Bowel sounds are normal.   There is no abdominal tenderness.     Extremities: Normal range of motion.    Pulses: Distal pulses are intact.    Neurological: Patient is alert and oriented to person, place and time.   PRN  bisacodyl (DULCOLAX) suppository 10 mg, 10 mg, Rectal, Daily PRN  acetaminophen (TYLENOL) tablet 650 mg, 650 mg, Oral, Q6H PRN **OR** acetaminophen (TYLENOL) suppository 650 mg, 650 mg, Rectal, Q6H PRN  atorvastatin (LIPITOR) tablet 10 mg, 10 mg, Oral, Nightly  budesonide (PULMICORT) nebulizer suspension 1 mg, 1 mg, Nebulization, BID  arformoterol tartrate (BROVANA) nebulizer solution 15 mcg, 15 mcg, Nebulization, BID RT      Assessment//Plan           Hospital Problems             Last Modified POA    * (Principal) Severe sepsis (HCC) 10/24/2024 Yes    Primary hypertension 10/23/2024 Yes    HLD (hyperlipidemia) 10/23/2024 Yes    Acute respiratory failure with hypoxia 10/24/2024 Yes    Hypokalemia 10/23/2024 Yes    Eosinophilia 10/23/2024 Yes    Thrombocytosis 10/23/2024 Yes    Bullous emphysema (HCC) 10/24/2024 Yes    Empyema lung (HCC) 10/24/2024 Yes    Large pleural effusion 10/24/2024 Yes    Pneumonia of left lung due to infectious organism 10/24/2024 Yes    Acute respiratory failure with hypoxemia 10/25/2024 Yes    Sepsis due to pneumonia (HCC) 10/26/2024 Yes    Severe protein-calorie malnutrition (HCC) 10/28/2024 Yes    Carcinoma of left lung (HCC) 10/29/2024 Yes    Malignant pleural effusion 11/1/2024 Yes     Assessment:    Condition: In stable condition.  Unchanged.   (    #Large malignant pleural effusion.  Status post CT-guided chest tube thoracostomy, lytics. Pleural effusion studies are exudative, lymphocytic and eosinophilic consistent with malignant effusion. Metasesis to left hilar node. MRI brain neg for mets  #Non-small cell Squamous cell carcinoma, poorly differentiated  #Eosinophilia  #Severe atelectasis secondary to pleural effusion  #Developing tension hydrothorax  #Acute hypoxic respiratory failure. 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

## 2024-11-03 NOTE — PLAN OF CARE
Problem: Discharge Planning  Goal: Discharge to home or other facility with appropriate resources  11/3/2024 0055 by Soo Oneal RN  Outcome: Progressing  Flowsheets (Taken 11/2/2024 1200 by Tracy Contreras RN)  Discharge to home or other facility with appropriate resources:   Identify barriers to discharge with patient and caregiver   Arrange for needed discharge resources and transportation as appropriate   Identify discharge learning needs (meds, wound care, etc)  Note:  Interventions: Patient needs and barriers will be addressed prior to discharge, patient will know if he will transition to LTAC at discharge or to home with [facility] services, Patient learning needs identified prior to discharge      Problem: Pain  Goal: Verbalizes/displays adequate comfort level or baseline comfort level  Description: Monitoring pain level and giving pain medication as needed  11/3/2024 0055 by Soo Oneal RN  Outcome: Progressing  Flowsheets (Taken 11/2/2024 1200 by Tracy Contreras RN)  Verbalizes/displays adequate comfort level or baseline comfort level:   Encourage patient to monitor pain and request assistance   Assess pain using appropriate pain scale   Administer analgesics based on type and severity of pain and evaluate response  Note:    Interventions: Patient will request pain medications based on pain scale as appropriate, patient pain will improve below pain goal or be at pain goal prior to discharge, Patient pain evaluated for satisfaction prior to end of shift         Problem: Safety - Adult  Goal: Free from fall injury  Description: Gripper socks, call bell in reach  11/3/2024 0055 by Soo Oneal RN  Outcome: Progressing  Flowsheets (Taken 11/2/2024 0250)  Free From Fall Injury:   Instruct family/caregiver on patient safety   Based on caregiver fall risk screen, instruct family/caregiver to ask for assistance with transferring infant if caregiver noted to have fall risk factors  Note:  Adult  Description: Turn Q2 hours  Goal: Oral mucous membranes remain intact  11/3/2024 0055 by Soo Oneal RN  Outcome: Progressing     Problem: Musculoskeletal - Adult  Goal: Return mobility to safest level of function  11/3/2024 0055 by Soo Oneal RN  Outcome: Progressing  Flowsheets (Taken 11/2/2024 1200 by Tracy Contreras RN)  Return Mobility to Safest Level of Function:   Assess patient stability and activity tolerance for standing, transferring and ambulating with or without assistive devices   Assist with transfers and ambulation using safe patient handling equipment as needed   Obtain physical therapy/occupational therapy consults as needed  Note: Intervention: Patient will ambulate with or without assistive devices without difficulty prior to discharge     Problem: Musculoskeletal - Adult  Goal: Maintain proper alignment of affected body part  11/3/2024 0055 by Soo Oneal RN  Outcome: Progressing     Problem: Musculoskeletal - Adult  Goal: Return ADL status to a safe level of function  11/3/2024 0055 by Soo Oneal RN  Outcome: Progressing     Problem: Gastrointestinal - Adult  Goal: Maintains or returns to baseline bowel function  11/3/2024 0055 by Soo Oneal RN  Outcome: Progressing  Flowsheets (Taken 11/2/2024 1200 by Tracy Contreras RN)  Maintains or returns to baseline bowel function:   Assess bowel function   Encourage oral fluids to ensure adequate hydration  Note: Intervention: Patient will maintain adequate intake of fluids prior to discharge to remain hydrated and to assist with motility     Problem: Infection - Adult  Goal: Absence of infection at discharge  11/3/2024 0055 by Soo Oneal RN  Outcome: Progressing  Flowsheets (Taken 11/2/2024 1200 by Tracy Contreras RN)  Absence of infection at discharge:   Assess and monitor for signs and symptoms of infection   Monitor lab/diagnostic results   Monitor all insertion sites i.e., indwelling lines, tubes and

## 2024-11-03 NOTE — PROGRESS NOTES
0721: Bedside and Verbal shift change report given to ASHLEY Christensen (oncoming nurse) by ASHLEY Vann (offgoing nurse). Report included the following information Nurse Handoff Report, Adult Overview, Intake/Output, MAR, and Recent Results.     1930: Bedside and Verbal shift change report given to ASHLEY Vann (oncoming nurse) by ASHLEY Christensen (offgoing nurse). Report included the following information Nurse Handoff Report, Adult Overview, Intake/Output, MAR, and Recent Results.

## 2024-11-03 NOTE — PLAN OF CARE
Problem: Pain  Goal: Verbalizes/displays adequate comfort level or baseline comfort level  Description: Monitoring pain level and giving pain medication as needed  11/3/2024 1049 by Amparo Perez RN  Outcome: Progressing  Flowsheets (Taken 11/3/2024 1049)  Verbalizes/displays adequate comfort level or baseline comfort level:   Implement non-pharmacological measures as appropriate and evaluate response   Assess pain using appropriate pain scale   Administer analgesics based on type and severity of pain and evaluate response  Note: Administer pain medications based on pain assessment score and reassessment after administration of medication  11/3/2024 0059 by Soo Oneal RN  Outcome: Progressing  Flowsheets (Taken 11/2/2024 1200 by Tracy Contreras RN)  Verbalizes/displays adequate comfort level or baseline comfort level:   Encourage patient to monitor pain and request assistance   Assess pain using appropriate pain scale   Administer analgesics based on type and severity of pain and evaluate response  11/3/2024 0055 by Soo Oneal RN  Outcome: Progressing  Flowsheets (Taken 11/2/2024 1200 by Tracy Contreras RN)  Verbalizes/displays adequate comfort level or baseline comfort level:   Encourage patient to monitor pain and request assistance   Assess pain using appropriate pain scale   Administer analgesics based on type and severity of pain and evaluate response  Note:    Interventions: Patient will request pain medications based on pain scale as appropriate, patient pain will improve below pain goal or be at pain goal prior to discharge, Patient pain evaluated for satisfaction prior to end of shift         Problem: Safety - Adult  Goal: Free from fall injury  Description: Gripper socks, call bell in reach  11/3/2024 1049 by Amparo Perez, RN  Outcome: Progressing  Flowsheets (Taken 11/2/2024 0250 by Soo Oneal RN)  Free From Fall Injury:   Instruct family/caregiver on patient safety   Based  177.8

## 2024-11-04 LAB
BASOPHILS # BLD: 0 K/UL (ref 0–0.1)
BASOPHILS NFR BLD: 0 % (ref 0–2)
DIFFERENTIAL METHOD BLD: ABNORMAL
EOSINOPHIL # BLD: 7.7 K/UL (ref 0–0.4)
EOSINOPHIL NFR BLD: 36 % (ref 0–5)
ERYTHROCYTE [DISTWIDTH] IN BLOOD BY AUTOMATED COUNT: 13 % (ref 11.6–14.5)
HCT VFR BLD AUTO: 28.4 % (ref 36–48)
HGB BLD-MCNC: 8.9 G/DL (ref 13–16)
IMM GRANULOCYTES # BLD AUTO: 0 K/UL (ref 0–0.04)
IMM GRANULOCYTES NFR BLD AUTO: 0 % (ref 0–0.5)
LYMPHOCYTES # BLD: 1.7 K/UL (ref 0.9–3.6)
LYMPHOCYTES NFR BLD: 8 % (ref 21–52)
MCH RBC QN AUTO: 28.8 PG (ref 24–34)
MCHC RBC AUTO-ENTMCNC: 31.3 G/DL (ref 31–37)
MCV RBC AUTO: 91.9 FL (ref 78–100)
MONOCYTES # BLD: 1.3 K/UL (ref 0.05–1.2)
MONOCYTES NFR BLD: 6 % (ref 3–10)
NEUTS SEG # BLD: 10.7 K/UL (ref 1.8–8)
NEUTS SEG NFR BLD: 50 % (ref 40–73)
NRBC # BLD: 0 K/UL (ref 0–0.01)
NRBC BLD-RTO: 0 PER 100 WBC
PLATELET # BLD AUTO: 562 K/UL (ref 135–420)
PLATELET COMMENT: ABNORMAL
PMV BLD AUTO: 8.9 FL (ref 9.2–11.8)
RBC # BLD AUTO: 3.09 M/UL (ref 4.35–5.65)
RBC MORPH BLD: ABNORMAL
RBC MORPH BLD: ABNORMAL
WBC # BLD AUTO: 21.4 K/UL (ref 4.6–13.2)

## 2024-11-04 PROCEDURE — 94640 AIRWAY INHALATION TREATMENT: CPT

## 2024-11-04 PROCEDURE — 97530 THERAPEUTIC ACTIVITIES: CPT

## 2024-11-04 PROCEDURE — 2580000003 HC RX 258: Performed by: PHYSICIAN ASSISTANT

## 2024-11-04 PROCEDURE — 6370000000 HC RX 637 (ALT 250 FOR IP): Performed by: STUDENT IN AN ORGANIZED HEALTH CARE EDUCATION/TRAINING PROGRAM

## 2024-11-04 PROCEDURE — 6360000002 HC RX W HCPCS: Performed by: STUDENT IN AN ORGANIZED HEALTH CARE EDUCATION/TRAINING PROGRAM

## 2024-11-04 PROCEDURE — 1100000003 HC PRIVATE W/ TELEMETRY

## 2024-11-04 PROCEDURE — 85025 COMPLETE CBC W/AUTO DIFF WBC: CPT

## 2024-11-04 PROCEDURE — 2700000000 HC OXYGEN THERAPY PER DAY

## 2024-11-04 PROCEDURE — 97535 SELF CARE MNGMENT TRAINING: CPT

## 2024-11-04 PROCEDURE — 6360000002 HC RX W HCPCS: Performed by: PHYSICIAN ASSISTANT

## 2024-11-04 PROCEDURE — 6370000000 HC RX 637 (ALT 250 FOR IP): Performed by: PHYSICIAN ASSISTANT

## 2024-11-04 PROCEDURE — 97164 PT RE-EVAL EST PLAN CARE: CPT

## 2024-11-04 PROCEDURE — 36415 COLL VENOUS BLD VENIPUNCTURE: CPT

## 2024-11-04 PROCEDURE — 94761 N-INVAS EAR/PLS OXIMETRY MLT: CPT

## 2024-11-04 PROCEDURE — 99232 SBSQ HOSP IP/OBS MODERATE 35: CPT | Performed by: STUDENT IN AN ORGANIZED HEALTH CARE EDUCATION/TRAINING PROGRAM

## 2024-11-04 RX ORDER — MAGNESIUM SULFATE IN WATER 40 MG/ML
2000 INJECTION, SOLUTION INTRAVENOUS ONCE
Status: COMPLETED | OUTPATIENT
Start: 2024-11-04 | End: 2024-11-04

## 2024-11-04 RX ORDER — METOPROLOL TARTRATE 25 MG/1
12.5 TABLET, FILM COATED ORAL 2 TIMES DAILY
Status: DISCONTINUED | OUTPATIENT
Start: 2024-11-04 | End: 2024-11-09

## 2024-11-04 RX ADMIN — SODIUM CHLORIDE, PRESERVATIVE FREE 10 ML: 5 INJECTION INTRAVENOUS at 19:56

## 2024-11-04 RX ADMIN — IPRATROPIUM BROMIDE 0.5 MG: 0.5 SOLUTION RESPIRATORY (INHALATION) at 08:09

## 2024-11-04 RX ADMIN — BUDESONIDE 1 MG: 1 SUSPENSION RESPIRATORY (INHALATION) at 08:09

## 2024-11-04 RX ADMIN — ARFORMOTEROL TARTRATE 15 MCG: 15 SOLUTION RESPIRATORY (INHALATION) at 08:09

## 2024-11-04 RX ADMIN — OXYCODONE HYDROCHLORIDE AND ACETAMINOPHEN 1 TABLET: 10; 325 TABLET ORAL at 21:26

## 2024-11-04 RX ADMIN — ENOXAPARIN SODIUM 40 MG: 100 INJECTION SUBCUTANEOUS at 09:36

## 2024-11-04 RX ADMIN — THERA TABS 1 TABLET: TAB at 09:36

## 2024-11-04 RX ADMIN — IPRATROPIUM BROMIDE 0.5 MG: 0.5 SOLUTION RESPIRATORY (INHALATION) at 14:02

## 2024-11-04 RX ADMIN — ARFORMOTEROL TARTRATE 15 MCG: 15 SOLUTION RESPIRATORY (INHALATION) at 19:36

## 2024-11-04 RX ADMIN — MAGNESIUM SULFATE HEPTAHYDRATE 2000 MG: 40 INJECTION, SOLUTION INTRAVENOUS at 10:54

## 2024-11-04 RX ADMIN — BUDESONIDE 1 MG: 1 SUSPENSION RESPIRATORY (INHALATION) at 19:36

## 2024-11-04 RX ADMIN — SODIUM CHLORIDE, PRESERVATIVE FREE 10 ML: 5 INJECTION INTRAVENOUS at 09:36

## 2024-11-04 RX ADMIN — METOPROLOL TARTRATE 12.5 MG: 25 TABLET, FILM COATED ORAL at 19:56

## 2024-11-04 RX ADMIN — ATORVASTATIN CALCIUM 10 MG: 10 TABLET, FILM COATED ORAL at 19:56

## 2024-11-04 RX ADMIN — OXYCODONE HYDROCHLORIDE AND ACETAMINOPHEN 1 TABLET: 10; 325 TABLET ORAL at 02:10

## 2024-11-04 RX ADMIN — POLYETHYLENE GLYCOL 3350 17 G: 17 POWDER, FOR SOLUTION ORAL at 09:36

## 2024-11-04 RX ADMIN — IPRATROPIUM BROMIDE 0.5 MG: 0.5 SOLUTION RESPIRATORY (INHALATION) at 19:36

## 2024-11-04 ASSESSMENT — PAIN DESCRIPTION - ORIENTATION
ORIENTATION: LEFT
ORIENTATION: LEFT

## 2024-11-04 ASSESSMENT — PAIN SCALES - GENERAL
PAINLEVEL_OUTOF10: 0
PAINLEVEL_OUTOF10: 0
PAINLEVEL_OUTOF10: 7
PAINLEVEL_OUTOF10: 0
PAINLEVEL_OUTOF10: 8
PAINLEVEL_OUTOF10: 0
PAINLEVEL_OUTOF10: 0

## 2024-11-04 ASSESSMENT — PAIN DESCRIPTION - DESCRIPTORS
DESCRIPTORS: DISCOMFORT
DESCRIPTORS: ACHING;DISCOMFORT

## 2024-11-04 ASSESSMENT — PAIN DESCRIPTION - LOCATION
LOCATION: CHEST
LOCATION: CHEST

## 2024-11-04 ASSESSMENT — PAIN DESCRIPTION - FREQUENCY: FREQUENCY: INTERMITTENT

## 2024-11-04 ASSESSMENT — PAIN DESCRIPTION - PAIN TYPE: TYPE: ACUTE PAIN

## 2024-11-04 ASSESSMENT — PAIN DESCRIPTION - ONSET: ONSET: PROGRESSIVE

## 2024-11-04 NOTE — PLAN OF CARE
Problem: Physical Therapy - Adult  Goal: By Discharge: Performs mobility at highest level of function for planned discharge setting.  See evaluation for individualized goals.  Description: Initiated  10/25/24  to be met within 7-10 days.    1.  Patient will move from supine to sit and sit to supine , scoot up and down, and roll side to side in bed with modified independence.    2.  Patient will transfer from bed to chair and chair to bed with modified independence using the least restrictive device.  3.  Patient will perform sit to stand with modified independence.  4.  Patient will ambulate with modified independence for 150 feet with the least restrictive device.   5.  Patient will ascend/descend 2 stairs with b/l handrail(s) with modified independence.    PLOF: Pt lives alone in a single story home, 2 KIRILL with handrails. Pt was independent with all mobility, no AD.    Outcome: Progressing     PHYSICAL THERAPY RE-EVALUATION    Patient: Delfin Shetty (68 y.o. 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  Patient's rehabilitation potential/Therapy Prognosis: Good.  Factors which may influence rehabilitation potential include:   []         None noted  []         Mental ability/status  [x]         Medical condition  [x]         Home/family situation

## 2024-11-04 NOTE — PLAN OF CARE
Problem: Occupational Therapy - Adult  Goal: By Discharge: Performs self-care activities at highest level of function for planned discharge setting.  See evaluation for individualized goals.  Description: Occupational Therapy Goals:  Initiated 10/25/2024, re-evaluated 10/31/2024, continue goals to be met within 7-10 days.    1.  Patient will perform grooming with supervision/set-up standing at sink with good balance.   2.  Patient will perform bathing with supervision/set-up.  3.  Patient will perform lower body dressing with supervision/set-up.  4.  Patient will perform toilet transfers with supervision/set-up  5.  Patient will perform all aspects of toileting with supervision/set-up.  6.  Patient will participate in upper extremity therapeutic exercise/activities with supervision/set-up for 8-10 minutes to increase strength/endurance for ADLs.    7.  Patient will utilize energy conservation techniques during functional activities with verbal cues.    PLOF: Pt lives alone in shed, 2 steps to enter, tub only, previously independent with ADLs.      Outcome: Progressing   OCCUPATIONAL THERAPY TREATMENT    Patient: Delfin Shetty (68 y.o. 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  ASSESSMENT:  Pt is pleasant and cooperative. Agreeable to OOB activity. Pt requires min vc's for safety w/multiple line mgt and bed mobility 2/2 impulsivity. Pt tolerates sitting EOB ~ 15 minutes performing LE ADLs, demonstrating energy conservation techniques. Pt refusing AD w/functional transfer, despite

## 2024-11-04 NOTE — CARE COORDINATION
[facility] has one male bed available however, they do not accept medicaid pending. FERNY called the patient  Mrs. Martinez to see if the patient re-certification application can be expedited.     FERNY left a , requesting a call back on the  .         Donovan Paul, MSW     [phone]

## 2024-11-04 NOTE — PROGRESS NOTES
Q6H PRN  polyethylene glycol (GLYCOLAX) packet 17 g, 17 g, Oral, Daily PRN  bisacodyl (DULCOLAX) suppository 10 mg, 10 mg, Rectal, Daily PRN  acetaminophen (TYLENOL) tablet 650 mg, 650 mg, Oral, Q6H PRN **OR** acetaminophen (TYLENOL) suppository 650 mg, 650 mg, Rectal, Q6H PRN  atorvastatin (LIPITOR) tablet 10 mg, 10 mg, Oral, Nightly  budesonide (PULMICORT) nebulizer suspension 1 mg, 1 mg, Nebulization, BID  arformoterol tartrate (BROVANA) nebulizer solution 15 mcg, 15 mcg, Nebulization, BID RT      Assessment//Plan           Hospital Problems             Last Modified POA    * (Principal) Severe sepsis (HCC) 10/24/2024 Yes    Primary hypertension 10/23/2024 Yes    HLD (hyperlipidemia) 10/23/2024 Yes    Acute respiratory failure with hypoxia 10/24/2024 Yes    Hypokalemia 10/23/2024 Yes    Eosinophilia 10/23/2024 Yes    Thrombocytosis 10/23/2024 Yes    Bullous emphysema (HCC) 10/24/2024 Yes    Empyema lung (HCC) 10/24/2024 Yes    Large pleural effusion 10/24/2024 Yes    Pneumonia of left lung due to infectious organism 10/24/2024 Yes    Acute respiratory failure with hypoxemia 10/25/2024 Yes    Sepsis due to pneumonia (HCC) 10/26/2024 Yes    Severe protein-calorie malnutrition (HCC) 10/28/2024 Yes    Carcinoma of left lung (HCC) 10/29/2024 Yes    Malignant pleural effusion 11/1/2024 Yes     Assessment:    Condition: In stable condition.  Unchanged.   (    #Large malignant pleural effusion.  Status post CT-guided chest tube thoracostomy, lytics. Pleural effusion studies are exudative, lymphocytic and eosinophilic consistent with malignant effusion. Metasesis to left hilar node. MRI brain neg for mets  #Non-small cell Squamous cell carcinoma, poorly differentiated  #Eosinophilia  #Severe atelectasis secondary to pleural effusion  #Developing tension hydrothorax  #Acute hypoxic respiratory failure. On 2L NC  #Possible lung mass, concern mesothelioma  #Suspected COPD with bullous  emphysema  #Cachexia    Chronic:  #Hypertension  #Hyperlipidemia  #Tobacco abuse).     Plan:   Consults: oncology.  Regular diet.   (    - Pulm signed off on 11/2  - PT/OT, out of bed to chair, ambulation as tolerated.    - Per pulm, can start Trelegy Ellipta 200 mcg daily along with albuterol HFA as needed and DuoNebs.   - Await results of PD-L1 testing on lung biopsy specimen.  - Patient will follow up with oncology outpatient next week to review lab work and discuss palliative chemotherapy versus immunotherapy versus chemoimmunotherapy.    ).       Case discussed with:  [x]Patient  []Family  []Nursing  []Case Management  DVT Prophylaxis:  [x]Lovenox  []Hep SQ  []SCDs  []Coumadin   []Heparin gtt   []Xarelto   []Eliquis    Redd Redmond DO, CATHY, MS  [facility]  Hospitalist

## 2024-11-04 NOTE — PROGRESS NOTES
Hematology / Oncology Progress Note      Patient: Delfin Shetty  Gender: male   MRN: [id number]    YOB: 1955 Age: 68 y.o.   CSN: [id number]    LOS:  LOS: 12 days   Admit Date: 10/23/2024     PCP: Jenifer Luz APRN - NP    Date of evaluation: 11/4/2024     Assessment:       ICD-10-CM    1. Sepsis due to pneumonia (HCC)  J18.9     A41.9       2. Pneumonia of left lung due to infectious organism, unspecified part of lung  J18.9       3. Large pleural effusion  J90       4. Acute respiratory failure with hypoxemia  J96.01       5. Bullous emphysema (HCC)  J43.9       6. Hypokalemia  E87.6       7. Empyema lung (HCC)  J86.9            1.  Large left malignant pleural effusion secondary to poorly differentiated carcinoma [squamous cell lung carcinoma] status post thoracentesis with pleural biopsy and left chest tube placement on 10/24/2024  2.  Left lateral apex masslike consolidation likely primary lesion  3.  Acute hypoxic respiratory failure secondary to COPD, large left pleural effusion and newly diagnosed lung cancer  4.  5 pound unintentional weight loss secondary to malignancy  6.  Neutrophilic leukocytosis, thrombocytosis and eosinophilia secondary to malignancy  7.  Anemia, normocytic, normochromic most likely secondary to malignancy     Pertinent imaging:  10/23/2024 CT chest PE protocol showed no evidence of pulmonary embolus or right heart strain.     There is a huge relatively loculated subpulmonic pleural effusion in the lower  chest depressing an inverting the left hemidiaphragm and displace the abdominal  contents. There is complete atelectasis of the left lower lobe and much of the  lingula. There is a combination of atelectasis and peripheral consolidation  involving the left upper lobe with additional peripheral somewhat loculated  fluid. There is a somewhat masslike consolidation in the lateral left apex which  is somewhat for possible mass and should be monitored on

## 2024-11-04 NOTE — PROGRESS NOTES
Assumed care of patient from ASHLEY Bryan (off going nurse) Patient alert and oriented. No apparent distress noted. Patient offers no concerns and can verbalize needs. Assessment to follow. Call bell within reach. Bed in lowest, locked position.     0948- Patient HR ranging from 120-130's. Patient assessed.Currently watching show on phone. NAD noted. Will continue to monitor. MD notified.Call bell within reach.

## 2024-11-04 NOTE — PLAN OF CARE
Problem: Discharge Planning  Goal: Discharge to home or other facility with appropriate resources  11/4/2024 1113 by Marisel Felix, RN  Outcome: Progressing  11/4/2024 0235 by Soo Oneal RN  Outcome: Progressing  Flowsheets (Taken 11/2/2024 1200 by Tracy Contreras, RN)  Discharge to home or other facility with appropriate resources:   Identify barriers to discharge with patient and caregiver   Arrange for needed discharge resources and transportation as appropriate   Identify discharge learning needs (meds, wound care, etc)     Problem: Pain  Goal: Verbalizes/displays adequate comfort level or baseline comfort level  Description: Monitoring pain level and giving pain medication as needed  11/4/2024 1113 by Marisel Felix RN  Outcome: Progressing  11/4/2024 0235 by Soo Oneal RN  Outcome: Progressing  Flowsheets (Taken 11/3/2024 1049 by Amparo Perez, RN)  Verbalizes/displays adequate comfort level or baseline comfort level:   Implement non-pharmacological measures as appropriate and evaluate response   Assess pain using appropriate pain scale   Administer analgesics based on type and severity of pain and evaluate response     Problem: Skin/Tissue Integrity  Goal: Absence of new skin breakdown  Description: 1.  Monitor for areas of redness and/or skin breakdown  2.  Assess vascular access sites hourly  3.  Every 4-6 hours minimum:  Change oxygen saturation probe site  4.  Every 4-6 hours:  If on nasal continuous positive airway pressure, respiratory therapy assess nares and determine need for appliance change or resting period.  11/4/2024 1113 by Marisel Felix, RN  Outcome: Progressing  11/4/2024 0235 by Soo Oneal, RN  Outcome: Progressing

## 2024-11-04 NOTE — PLAN OF CARE
Tracy JACOBSEN RN)  Skin Integrity Remains Intact: Monitor for areas of redness and/or skin breakdown     Problem: Skin/Tissue Integrity - Adult  Description: Turn Q2 hours  Goal: Incisions, wounds, or drain sites healing without S/S of infection  Outcome: Progressing  Flowsheets (Taken 11/4/2024 0235)  Incisions, Wounds, or Drain Sites Healing Without Sign and Symptoms of Infection:   Implement wound care per orders   Initiate pressure ulcer prevention bundle as indicated   TWICE DAILY: Assess and document skin integrity    Problem: Musculoskeletal - Adult  Goal: Return mobility to safest level of function  Outcome: Progressing  Flowsheets (Taken 11/2/2024 1200 by Tracy Contreras RN)  Return Mobility to Safest Level of Function:   Assess patient stability and activity tolerance for standing, transferring and ambulating with or without assistive devices   Assist with transfers and ambulation using safe patient handling equipment as needed   Obtain physical therapy/occupational therapy consults as needed     Problem: Musculoskeletal - Adult  Goal: Maintain proper alignment of affected body part  Outcome: Progressing  Flowsheets (Taken 10/30/2024 2015 by Alona Suarez RN)  Maintain proper alignment of affected body part: Support and protect limb and body alignment per provider's orders     Problem: Gastrointestinal - Adult  Goal: Maintains or returns to baseline bowel function  Outcome: Progressing  Flowsheets (Taken 11/4/2024 0235)  Maintains or returns to baseline bowel function:   Assess bowel function   Encourage mobilization and activity   Encourage oral fluids to ensure adequate hydration   Administer ordered medications as needed     Problem: Infection - Adult  Goal: Absence of infection at discharge  Outcome: Progressing  Flowsheets (Taken 11/4/2024 0235)  Absence of infection at discharge:   Assess and monitor for signs and symptoms of infection   Monitor all insertion sites i.e., indwelling lines, tubes and  drains   Instruct and encourage patient and family to use good hand hygiene technique   Monitor lab/diagnostic results   Administer medications as ordered     Problem: Infection - Adult  Goal: Absence of infection during hospitalization  Outcome: Progressing  Flowsheets (Taken 11/4/2024 0235)  Absence of infection during hospitalization:   Assess and monitor for signs and symptoms of infection   Monitor all insertion sites i.e., indwelling lines, tubes and drains   Instruct and encourage patient and family to use good hand hygiene technique   Monitor lab/diagnostic results   Administer medications as ordered     Problem: Infection - Adult  Goal: Absence of fever/infection during anticipated neutropenic period  Outcome: Progressing  Flowsheets (Taken 11/2/2024 1200 by Tracy Contreras RN)  Absence of fever/infection during anticipated neutropenic period: Monitor white blood cell count     Problem: Metabolic/Fluid and Electrolytes - Adult  Goal: Electrolytes maintained within normal limits  Outcome: Progressing  Flowsheets (Taken 11/4/2024 0235)  Electrolytes maintained within normal limits:   Monitor labs and assess patient for signs and symptoms of electrolyte imbalances   Monitor response to electrolyte replacements, including repeat lab results as appropriate   Administer electrolyte replacement as ordered     Problem: Metabolic/Fluid and Electrolytes - Adult  Goal: Hemodynamic stability and optimal renal function maintained  Outcome: Progressing  Flowsheets (Taken 11/4/2024 0235)  Hemodynamic stability and optimal renal function maintained:   Monitor intake, output and patient weight   Monitor response to interventions for patient's volume status, including labs, urine output, blood pressure (other measures as available)   Encourage oral intake as appropriate   Instruct patient on fluid and nutrition restrictions as appropriate     Problem: Hematologic - Adult  Goal: Maintains hematologic stability  Outcome:

## 2024-11-05 LAB
BASOPHILS # BLD: 0.2 K/UL (ref 0–0.1)
BASOPHILS NFR BLD: 1 % (ref 0–2)
DIFFERENTIAL METHOD BLD: ABNORMAL
EOSINOPHIL # BLD: 5.3 K/UL (ref 0–0.4)
EOSINOPHIL NFR BLD: 26 % (ref 0–5)
ERYTHROCYTE [DISTWIDTH] IN BLOOD BY AUTOMATED COUNT: 13.1 % (ref 11.6–14.5)
HCT VFR BLD AUTO: 27.2 % (ref 36–48)
HGB BLD-MCNC: 8.6 G/DL (ref 13–16)
IMM GRANULOCYTES # BLD AUTO: 0.2 K/UL (ref 0–0.04)
IMM GRANULOCYTES NFR BLD AUTO: 1 % (ref 0–0.5)
LYMPHOCYTES # BLD: 1.7 K/UL (ref 0.9–3.6)
LYMPHOCYTES NFR BLD: 9 % (ref 21–52)
MCH RBC QN AUTO: 28.7 PG (ref 24–34)
MCHC RBC AUTO-ENTMCNC: 31.6 G/DL (ref 31–37)
MCV RBC AUTO: 90.7 FL (ref 78–100)
MONOCYTES # BLD: 2 K/UL (ref 0.05–1.2)
MONOCYTES NFR BLD: 10 % (ref 3–10)
NEUTS SEG # BLD: 10.8 K/UL (ref 1.8–8)
NEUTS SEG NFR BLD: 54 % (ref 40–73)
NRBC # BLD: 0 K/UL (ref 0–0.01)
NRBC BLD-RTO: 0 PER 100 WBC
PLATELET # BLD AUTO: 546 K/UL (ref 135–420)
PMV BLD AUTO: 8.7 FL (ref 9.2–11.8)
RBC # BLD AUTO: 3 M/UL (ref 4.35–5.65)
WBC # BLD AUTO: 20.1 K/UL (ref 4.6–13.2)

## 2024-11-05 PROCEDURE — 1100000003 HC PRIVATE W/ TELEMETRY

## 2024-11-05 PROCEDURE — 6370000000 HC RX 637 (ALT 250 FOR IP): Performed by: STUDENT IN AN ORGANIZED HEALTH CARE EDUCATION/TRAINING PROGRAM

## 2024-11-05 PROCEDURE — 6360000002 HC RX W HCPCS: Performed by: STUDENT IN AN ORGANIZED HEALTH CARE EDUCATION/TRAINING PROGRAM

## 2024-11-05 PROCEDURE — 6360000002 HC RX W HCPCS: Performed by: PHYSICIAN ASSISTANT

## 2024-11-05 PROCEDURE — 2580000003 HC RX 258: Performed by: PHYSICIAN ASSISTANT

## 2024-11-05 PROCEDURE — 94669 MECHANICAL CHEST WALL OSCILL: CPT

## 2024-11-05 PROCEDURE — 85025 COMPLETE CBC W/AUTO DIFF WBC: CPT

## 2024-11-05 PROCEDURE — 99232 SBSQ HOSP IP/OBS MODERATE 35: CPT | Performed by: STUDENT IN AN ORGANIZED HEALTH CARE EDUCATION/TRAINING PROGRAM

## 2024-11-05 PROCEDURE — 94761 N-INVAS EAR/PLS OXIMETRY MLT: CPT

## 2024-11-05 PROCEDURE — 2700000000 HC OXYGEN THERAPY PER DAY

## 2024-11-05 PROCEDURE — 36415 COLL VENOUS BLD VENIPUNCTURE: CPT

## 2024-11-05 PROCEDURE — 94640 AIRWAY INHALATION TREATMENT: CPT

## 2024-11-05 PROCEDURE — 6370000000 HC RX 637 (ALT 250 FOR IP): Performed by: PHYSICIAN ASSISTANT

## 2024-11-05 RX ADMIN — OXYCODONE HYDROCHLORIDE AND ACETAMINOPHEN 1 TABLET: 10; 325 TABLET ORAL at 20:49

## 2024-11-05 RX ADMIN — SODIUM CHLORIDE, PRESERVATIVE FREE 10 ML: 5 INJECTION INTRAVENOUS at 10:14

## 2024-11-05 RX ADMIN — ENOXAPARIN SODIUM 40 MG: 100 INJECTION SUBCUTANEOUS at 10:14

## 2024-11-05 RX ADMIN — BUDESONIDE 1 MG: 1 SUSPENSION RESPIRATORY (INHALATION) at 21:02

## 2024-11-05 RX ADMIN — METOPROLOL TARTRATE 12.5 MG: 25 TABLET, FILM COATED ORAL at 10:13

## 2024-11-05 RX ADMIN — THERA TABS 1 TABLET: TAB at 10:14

## 2024-11-05 RX ADMIN — ARFORMOTEROL TARTRATE 15 MCG: 15 SOLUTION RESPIRATORY (INHALATION) at 21:02

## 2024-11-05 RX ADMIN — SODIUM CHLORIDE, PRESERVATIVE FREE 10 ML: 5 INJECTION INTRAVENOUS at 20:50

## 2024-11-05 RX ADMIN — IPRATROPIUM BROMIDE 0.5 MG: 0.5 SOLUTION RESPIRATORY (INHALATION) at 08:49

## 2024-11-05 RX ADMIN — ARFORMOTEROL TARTRATE 15 MCG: 15 SOLUTION RESPIRATORY (INHALATION) at 08:49

## 2024-11-05 RX ADMIN — BUDESONIDE 1 MG: 1 SUSPENSION RESPIRATORY (INHALATION) at 08:49

## 2024-11-05 RX ADMIN — IPRATROPIUM BROMIDE 0.5 MG: 0.5 SOLUTION RESPIRATORY (INHALATION) at 21:02

## 2024-11-05 RX ADMIN — ATORVASTATIN CALCIUM 10 MG: 10 TABLET, FILM COATED ORAL at 20:49

## 2024-11-05 RX ADMIN — METOPROLOL TARTRATE 12.5 MG: 25 TABLET, FILM COATED ORAL at 20:49

## 2024-11-05 ASSESSMENT — PAIN SCALES - GENERAL
PAINLEVEL_OUTOF10: 0
PAINLEVEL_OUTOF10: 7
PAINLEVEL_OUTOF10: 0

## 2024-11-05 ASSESSMENT — PAIN DESCRIPTION - ONSET: ONSET: GRADUAL

## 2024-11-05 ASSESSMENT — PAIN DESCRIPTION - ORIENTATION: ORIENTATION: LEFT

## 2024-11-05 ASSESSMENT — PAIN - FUNCTIONAL ASSESSMENT: PAIN_FUNCTIONAL_ASSESSMENT: PREVENTS OR INTERFERES SOME ACTIVE ACTIVITIES AND ADLS

## 2024-11-05 ASSESSMENT — PAIN DESCRIPTION - PAIN TYPE: TYPE: ACUTE PAIN

## 2024-11-05 ASSESSMENT — PAIN DESCRIPTION - FREQUENCY: FREQUENCY: INTERMITTENT

## 2024-11-05 ASSESSMENT — PAIN DESCRIPTION - DESCRIPTORS: DESCRIPTORS: DISCOMFORT

## 2024-11-05 ASSESSMENT — PAIN DESCRIPTION - LOCATION: LOCATION: FLANK

## 2024-11-05 NOTE — CARE COORDINATION
FERNY called [facility] in [place], however, they are closed for election day.     Donovan Paul, MSW     [phone]

## 2024-11-05 NOTE — PLAN OF CARE
Problem: Pain  Goal: Verbalizes/displays adequate comfort level or baseline comfort level  Description: Monitoring pain level and giving pain medication as needed  11/5/2024 0009 by Alona Suarez, RN  Outcome: Progressing  Flowsheets (Taken 11/4/2024 2000)  Verbalizes/displays adequate comfort level or baseline comfort level:   Assess pain using appropriate pain scale   Encourage patient to monitor pain and request assistance  Note: Patient endorsed pain to the L flank where previous chest tube was. Patient received Oxycodone and stated his pain was relieved after upon reassessment. Patient will be reassessed frequently using appropriate pain scale.    Problem: Safety - Adult  Goal: Free from fall injury  Description: Gripper socks, call bell in reach  11/5/2024 0009 by Alona Suarez RN  Outcome: Progressing  Flowsheets (Taken 11/5/2024 0009)  Free From Fall Injury: Instruct family/caregiver on patient safety  Note: Patient will remain free of injuries and falls during admission while using appropriate safety interventions. Educated patient on safety interventions: wearing non-skid yellow socks, using call light for assistance, and safe exit side of the bed when applicable. Transfers will be monitored with staff present in the room to assist.     Problem: Cardiovascular - Adult  Goal: Maintains optimal cardiac output and hemodynamic stability  11/5/2024 0009 by Alona Suarez, RN  Outcome: Progressing  Flowsheets  Taken 11/5/2024 0009  Maintains optimal cardiac output and hemodynamic stability: Monitor blood pressure and heart rate  Taken 11/4/2024 2000  Maintains optimal cardiac output and hemodynamic stability:   Monitor blood pressure and heart rate   Monitor urine output and notify Licensed Independent Practitioner for values outside of normal range  Note: Metoprolol 12.5mg oral was added to patients medication list due to heart rate in the 120's-130's Sinus Tach. Administered metoprolol with night medications and heart

## 2024-11-05 NOTE — PLAN OF CARE
Problem: Pain  Goal: Verbalizes/displays adequate comfort level or baseline comfort level  Description: Monitoring pain level and giving pain medication as needed  11/5/2024 1052 by Gaby Loera RN  Outcome: Progressing  Flowsheets (Taken 11/5/2024 1052)  Verbalizes/displays adequate comfort level or baseline comfort level:   Encourage patient to monitor pain and request assistance   Assess pain using appropriate pain scale    Problem: Safety - Adult  Goal: Free from fall injury  Description: Gripper socks, call bell in reach  11/5/2024 1052 by Gaby Loera RN  Outcome: Progressing  Flowsheets (Taken 11/5/2024 1052)  Free From Fall Injury: Instruct family/caregiver on patient safety  Note: -Encourage use of call bell     Problem: Skin/Tissue Integrity  Goal: Absence of new skin breakdown  Description: 1.  Monitor for areas of redness and/or skin breakdown  2.  Assess vascular access sites hourly  3.  Every 4-6 hours minimum:  Change oxygen saturation probe site  4.  Every 4-6 hours:  If on nasal continuous positive airway pressure, respiratory therapy assess nares and determine need for appliance change or resting period.  Outcome: Progressing  Note: -Educate/encourage importance of repositioning to decrease risk of skin breakdown.     Problem: Respiratory - Adult  Goal: Achieves optimal ventilation and oxygenation  Outcome: Progressing  Flowsheets (Taken 11/5/2024 1052)  Achieves optimal ventilation and oxygenation:   Assess for changes in respiratory status   Assess for changes in mentation and behavior     Problem: Cardiovascular - Adult  Goal: Maintains optimal cardiac output and hemodynamic stability  11/5/2024 1052 by Gaby Loera, RN  Outcome: Progressing  Flowsheets (Taken 11/5/2024 1052)  Maintains optimal cardiac output and hemodynamic stability: Monitor blood pressure and heart rate    Problem: Musculoskeletal - Adult  Goal: Return mobility to safest level of function  Outcome:  Progressing  Flowsheets (Taken 11/5/2024 1052)  Return Mobility to Safest Level of Function: Assess patient stability and activity tolerance for standing, transferring and ambulating with or without assistive devices     Problem: Metabolic/Fluid and Electrolytes - Adult  Goal: Electrolytes maintained within normal limits  Outcome: Progressing  Flowsheets (Taken 11/5/2024 1052)  Electrolytes maintained within normal limits: Monitor labs and assess patient for signs and symptoms of electrolyte imbalances

## 2024-11-05 NOTE — PROGRESS NOTES
[facility] Hospitalist Group  Progress Note  Date:2024       Room:[facility]  Patient Name:Delfin Shetty     YOB: 1955     Age:68 y.o.        Subjective    Subjective:  Symptoms:  Stable.  No shortness of breath or chest pain.    Diet:  Adequate intake.    Activity level: Normal.    Pain:  He reports no pain.       Review of Systems   Respiratory:  Negative for shortness of breath.    Cardiovascular:  Negative for chest pain.       No acute events overnight.  No complaints. Good appeitite.    Disposition: Medically stable for discharge. Has acceptance at LT. Pending Medicaid.    Objective         Vitals Last 24 Hours:  TEMPERATURE:  Temp  Av °F (37.2 °C)  Min: 98.4 °F (36.9 °C)  Max: 99.9 °F (37.7 °C)  RESPIRATIONS RANGE: Resp  Av.9  Min: 18  Max: 22  PULSE OXIMETRY RANGE: SpO2  Av.8 %  Min: 90 %  Max: 97 %  PULSE RANGE: Pulse  Av.8  Min: 105  Max: 130  BLOOD PRESSURE RANGE: Systolic (24hrs), Av , Min:113 , Max:141     ; Diastolic (24hrs), Av, Min:66, Max:76      I/O (24Hr):    Intake/Output Summary (Last 24 hours) at 2024 1337  Last data filed at 2024 0404  Gross per 24 hour   Intake 480 ml   Output 400 ml   Net 80 ml     Objective:  General Appearance:  Comfortable.    Vital signs: (most recent): Blood pressure 113/66, pulse (!) 110, temperature 98.9 °F (37.2 °C), temperature source Oral, resp. rate 19, height 1.702 m (5' 7.01\"), weight 57.9 kg (127 lb 10.3 oz), SpO2 90%.    Output: Producing urine.    HEENT: Normal HEENT exam.    Lungs:  Normal effort and normal respiratory rate.  Breath sounds clear to auscultation.  (On nasal cannula)  Heart: Normal rate.  Regular rhythm.    Chest: (Mediport in place)  Abdomen: Abdomen is soft and flat.  Bowel sounds are normal.   There is no abdominal tenderness.     Extremities: Normal range of motion.    Pulses: Distal pulses are intact.    Neurological: Patient is alert and oriented to  osseous lesions. Attention on follow-up. Electronically signed by Britt Osborn    XR CHEST PORTABLE    Result Date: 10/29/2024  No change of the left base tube thoracostomy. No pneumothorax. Persistent patchy opacity and peripheral patchy opacities are consistent with consolidation/atelectasis. Again noted is somewhat masslike opacity in the lateral apex. The right lung is clear. Electronically signed by Kaiser Kevin    XR CHEST PORTABLE    Result Date: 10/28/2024  Left base tube thoracostomy. No pneumothorax. Persistent peripheral patchy consolidation in the left lung, apex to mid lung with left base opacification consistent with atelectasis/infiltrate and possible residual effusion, not significantly changed. Again noted is the somewhat masslike consolidation in the lateral left apex noted on prior CTA, not significantly changed. Electronically signed by Kaiser Kevin      Current Medications:  Current Facility-Administered Medications: metoprolol tartrate (LOPRESSOR) tablet 12.5 mg, 12.5 mg, Oral, BID  oxyCODONE-acetaminophen (PERCOCET)  MG per tablet 1 tablet, 1 tablet, Oral, Q4H PRN  diatrizoate meglumine-sodium (GASTROGRAFIN) 66-10 % solution 30 mL, 30 mL, Oral, ONCE PRN  ipratropium (ATROVENT) 0.02 % nebulizer solution 0.5 mg, 0.5 mg, Nebulization, TID RT  multivitamin 1 tablet, 1 tablet, Oral, Daily  sodium chloride flush 0.9 % injection 5-40 mL, 5-40 mL, IntraVENous, 2 times per day  sodium chloride flush 0.9 % injection 5-40 mL, 5-40 mL, IntraVENous, PRN  0.9 % sodium chloride infusion, , IntraVENous, PRN  potassium chloride (KLOR-CON M) extended release tablet 40 mEq, 40 mEq, Oral, PRN **OR** potassium bicarb-citric acid (EFFER-K) effervescent tablet 40 mEq, 40 mEq, Oral, PRN **OR** potassium chloride 10 mEq/100 mL IVPB (Peripheral Line), 10 mEq, IntraVENous, PRN  magnesium sulfate 2000 mg in 50 mL IVPB premix, 2,000 mg, IntraVENous, PRN  enoxaparin (LOVENOX) injection 40 mg, 40 mg, SubCUTAneous,

## 2024-11-05 NOTE — PROGRESS NOTES
4 Eyes Skin Assessment     NAME:  Delfin Shetty  YOB: 1955  MEDICAL RECORD NUMBER:  [id number]    The patient is being assessed for  Shift Handoff    I agree that at least one RN has performed a thorough Head to Toe Skin Assessment on the patient. ALL assessment sites listed below have been assessed.      Areas assessed by both nurses:    Head, Face, Ears, Shoulders, Back, Chest, Arms, Elbows, Hands, Sacrum. Buttock, Coccyx, Ischium, Legs. Feet and Heels, and Under Medical Devices         Does the Patient have a Wound? Yes wound(s) were present on assessment. LDA wound assessment was Initiated and completed by RN       Valentin Prevention initiated by RN: Yes  Wound Care Orders initiated by RN: No    Pressure Injury (Stage 3,4, Unstageable, DTI, NWPT, and Complex wounds) if present, place Wound referral order by RN under : No    New Ostomies, if present place, Ostomy referral order under : No     Nurse 1 eSignature: Electronically signed by Marisel Felix RN on 11/4/24 at 8:02 PM EST    **SHARE this note so that the co-signing nurse can place an eSignature**    Nurse 2 eSignature: Electronically signed by BEL PALOMINO RN on 11/5/24 at 4:33 AM EST

## 2024-11-05 NOTE — PROGRESS NOTES
4 Eyes Skin Assessment     NAME:  Delfin Shetty  YOB: 1955  MEDICAL RECORD NUMBER:  [id number]    The patient is being assessed for  Shift Handoff    I agree that at least one RN has performed a thorough Head to Toe Skin Assessment on the patient. ALL assessment sites listed below have been assessed.      Areas assessed by both nurses:    Head, Face, Ears, Shoulders, Back, Chest, Arms, Elbows, Hands, Sacrum. Buttock, Coccyx, Ischium, Legs. Feet and Heels, and Under Medical Devices         Does the Patient have a Wound? Yes wound(s) were present on assessment. LDA wound assessment was Initiated and completed by RN     L Flank s/p chest tube removed  Valentin Prevention initiated by RN: Yes  Wound Care Orders initiated by RN: No    Pressure Injury (Stage 3,4, Unstageable, DTI, NWPT, and Complex wounds) if present, place Wound referral order by RN under : No    New Ostomies, if present place, Ostomy referral order under : No     Nurse 1 eSignature: Electronically signed by BEL PALOMINO RN on 11/5/24 at 7:47 AM EST    **SHARE this note so that the co-signing nurse can place an eSignature**    Nurse 2 eSignature: Electronically signed by Gaby Garcia RN on 11/5/24 at 8:14 PM EST\

## 2024-11-05 NOTE — PROGRESS NOTES
Comprehensive Nutrition Assessment    Type and Reason for Visit:  Reassess    Nutrition Recommendations/Plan:   Continue current diet as tolerated.  Order Ensure Plus High Protein (each provides 350 kcal, 20g protein) TID  Continue multivitamin supplementation daily  Daily wts.   Continue to monitor tolerance of PO, compliance of oral supplements, weight, labs, and plan of care during admission.     Malnutrition Assessment:  Malnutrition Status:  Severe malnutrition (10/28/24 1133)    Context:  Chronic Illness     Findings of the 6 clinical characteristics of malnutrition:  Energy Intake:  75% or less estimated energy requirements for 1 month or longer  Weight Loss:  Greater than 7.5% over 3 months     Body Fat Loss:  Severe body fat loss Triceps, Buccal region   Muscle Mass Loss:  Severe muscle mass loss Temples (temporalis), Clavicles (pectoralis & deltoids), Thigh (quadriceps), Scapula (trapezius), Hand (interosseous), Calf (gastrocnemius)  Fluid Accumulation:  No significant fluid accumulation     Strength:  Not Performed    Nutrition Assessment:    Pt admitted for management of severe sepsis.   Per flow sheets, meal intake % x 3 entries; no documentation or supplement intake. Note supplement order d/c when diet made NPO 10/30; not added once diet advanced back to regular; plan to order again.    Nutrition Related Findings:    Pertinent Meds:   MVI Pertinent Labs:  Last BMP (11/2/24) reviewed.     Last BM: 11/04/24    Skin: Wound Type:  (chest tube, sternal puncture)    Edema:    None      Current Nutrition Intake & Therapies:    Average Meal Intake: 51-75%  Average Supplements Intake: None Ordered  ADULT DIET; Regular    Anthropometric Measures:  Height: 170.2 cm (5' 7.01\")  Ideal Body Weight (IBW): 148 lbs (67 kg)    Admission Body Weight: 58.5 kg (128 lb 15.5 oz)  Current Body Weight: 57.7 kg (127 lb 3.3 oz), 85.9 % IBW. Weight Source: Bed scale  Current BMI (kg/m2): 19.9  Usual Body Weight: 70.3 kg  (155 lb)  % Weight Change (Calculated): -17.9  Weight Adjustment For: No Adjustment  BMI Categories: Underweight (BMI less than 22) age over 65    Estimated Daily Nutrient Needs:  Energy Requirements Based On: Kcal/kg  Weight Used for Energy Requirements: Current  Energy (kcal/day): 0381-8016 kcal (30-35 kcal/kg), underweight  Weight Used for Protein Requirements: Current  Protein (g/day): 58-69 g (1-1.2 g/kg)  Method Used for Fluid Requirements: 1 ml/kcal  Fluid (ml/day): 1958-4955 mL or per MD    Nutrition Diagnosis:   Severe malnutrition, In context of chronic illness (possible mesothelioma/metastatic lung cancer) related to inadequate protein-energy intake as evidenced by Criteria as identified in malnutrition assessment  Underweight related to increase demand for energy/nutrients, inadequate protein-energy intake as evidenced by BMI, weight loss    Nutrition Interventions:   Food and/or Nutrient Delivery: Continue Current Diet, Start Oral Nutrition Supplement  Nutrition Education/Counseling: No recommendation at this time  Coordination of Nutrition Care: Continue to monitor while inpatient  Plan of Care discussed with: Pt    Goals:  Previous Goal Met: Progressing toward Goal(s)  Goals: Meet at least 75% of estimated needs, by next RD assessment       Nutrition Monitoring and Evaluation:   Behavioral-Environmental Outcomes: None Identified  Food/Nutrient Intake Outcomes: Food and Nutrient Intake, Supplement Intake  Physical Signs/Symptoms Outcomes: Biochemical Data, Weight, Skin, Nutrition Focused Physical Findings, GI Status    Discharge Planning:    Continue Oral Nutrition Supplement     Rae Westbrook RD  Contact: [phone]  Available via [employer]

## 2024-11-05 NOTE — RT PROTOCOL NOTE
NEB PROTOCOL:  ASSESSMENT    Patient  Delfin Shetty     68 y.o.   male     11/5/2024  4:51 PM       Nebulizer Therapy: Current medications atrovent tid brovana/pulmicort bid       Problem List:   Patient Active Problem List   Diagnosis    Severe sepsis (HCC)    Primary hypertension    HLD (hyperlipidemia)    Acute respiratory failure with hypoxia    Hypokalemia    Eosinophilia    Thrombocytosis    Bullous emphysema (HCC)    Empyema lung (HCC)    Large pleural effusion    Pneumonia of left lung due to infectious organism    Acute respiratory failure with hypoxemia    Sepsis due to pneumonia (HCC)    Severe protein-calorie malnutrition (HCC)    Carcinoma of left lung (HCC)    Malignant pleural effusion       Patient alert and cooperative to use MDI: yes    Home Respiratory Therapy Regimen/Frequency:  yes  Medication   Device  Frequency     SEVERITY INDEX:    ITEM 0 1 2 3 4 Score   Respiratory Pattern and or Rate Regular  10-19 Regular  20-24   24-30    30-34 Severe SOB or   Greater than 35 0   Breath Sounds Clear/diminished Occasional Wheeze Mild Wheezing Moderate Wheezing  wheezing/Absent breath sounds 0   Shortness of Breath None Dyspnea on Exertion Dyspnea at Rest Moderate Shortness of Breath at Rest Severe Shortness of Breath - Limited Speech 1       Total Score:  1    * Scoring Guidelines  0-4 pts:  PRN-BID   5-7 pts:  BID, TID, QID  8-9 pts:  TID, QID, Q6  10-12 pts:  Q4-Q6  * - Guidelines used with clinical judgement.  PRN Treatments can be ordered to supplement scheduled treatments.  Regardless of score, frequency should not be less than normal home regimen.    Recommended Order/Frequency:  dc atrovent tid, begin atrovent bid, continue brovana pulmicort bid    Comments:      Last several breath sounds patient has been clear/decreased    Respiratory Therapist: Rutyh Hamilton RCP

## 2024-11-06 LAB
BASOPHILS # BLD: 0.2 K/UL (ref 0–0.1)
BASOPHILS NFR BLD: 1 % (ref 0–2)
DIFFERENTIAL METHOD BLD: ABNORMAL
EOSINOPHIL # BLD: 3.9 K/UL (ref 0–0.4)
EOSINOPHIL NFR BLD: 20 % (ref 0–5)
ERYTHROCYTE [DISTWIDTH] IN BLOOD BY AUTOMATED COUNT: 13 % (ref 11.6–14.5)
HCT VFR BLD AUTO: 27 % (ref 36–48)
HGB BLD-MCNC: 8.6 G/DL (ref 13–16)
IMM GRANULOCYTES # BLD AUTO: 0 K/UL (ref 0–0.04)
IMM GRANULOCYTES NFR BLD AUTO: 0 % (ref 0–0.5)
LYMPHOCYTES # BLD: 2.5 K/UL (ref 0.9–3.6)
LYMPHOCYTES NFR BLD: 13 % (ref 21–52)
MCH RBC QN AUTO: 28.8 PG (ref 24–34)
MCHC RBC AUTO-ENTMCNC: 31.9 G/DL (ref 31–37)
MCV RBC AUTO: 90.3 FL (ref 78–100)
MONOCYTES # BLD: 1.2 K/UL (ref 0.05–1.2)
MONOCYTES NFR BLD: 6 % (ref 3–10)
NEUTS SEG # BLD: 11.5 K/UL (ref 1.8–8)
NEUTS SEG NFR BLD: 60 % (ref 40–73)
NRBC # BLD: 0 K/UL (ref 0–0.01)
NRBC BLD-RTO: 0 PER 100 WBC
PLATELET # BLD AUTO: 523 K/UL (ref 135–420)
PLATELET COMMENT: ABNORMAL
PMV BLD AUTO: 8.5 FL (ref 9.2–11.8)
RBC # BLD AUTO: 2.99 M/UL (ref 4.35–5.65)
RBC MORPH BLD: ABNORMAL
WBC # BLD AUTO: 19.3 K/UL (ref 4.6–13.2)

## 2024-11-06 PROCEDURE — 94761 N-INVAS EAR/PLS OXIMETRY MLT: CPT

## 2024-11-06 PROCEDURE — 6360000002 HC RX W HCPCS: Performed by: STUDENT IN AN ORGANIZED HEALTH CARE EDUCATION/TRAINING PROGRAM

## 2024-11-06 PROCEDURE — 36415 COLL VENOUS BLD VENIPUNCTURE: CPT

## 2024-11-06 PROCEDURE — 97535 SELF CARE MNGMENT TRAINING: CPT

## 2024-11-06 PROCEDURE — 1100000003 HC PRIVATE W/ TELEMETRY

## 2024-11-06 PROCEDURE — 2580000003 HC RX 258: Performed by: PHYSICIAN ASSISTANT

## 2024-11-06 PROCEDURE — 6370000000 HC RX 637 (ALT 250 FOR IP): Performed by: PHYSICIAN ASSISTANT

## 2024-11-06 PROCEDURE — 94668 MNPJ CHEST WALL SBSQ: CPT

## 2024-11-06 PROCEDURE — 85025 COMPLETE CBC W/AUTO DIFF WBC: CPT

## 2024-11-06 PROCEDURE — 97116 GAIT TRAINING THERAPY: CPT

## 2024-11-06 PROCEDURE — 99232 SBSQ HOSP IP/OBS MODERATE 35: CPT | Performed by: STUDENT IN AN ORGANIZED HEALTH CARE EDUCATION/TRAINING PROGRAM

## 2024-11-06 PROCEDURE — 6370000000 HC RX 637 (ALT 250 FOR IP): Performed by: STUDENT IN AN ORGANIZED HEALTH CARE EDUCATION/TRAINING PROGRAM

## 2024-11-06 PROCEDURE — 2700000000 HC OXYGEN THERAPY PER DAY

## 2024-11-06 PROCEDURE — 6360000002 HC RX W HCPCS: Performed by: PHYSICIAN ASSISTANT

## 2024-11-06 PROCEDURE — 94669 MECHANICAL CHEST WALL OSCILL: CPT

## 2024-11-06 PROCEDURE — 94640 AIRWAY INHALATION TREATMENT: CPT

## 2024-11-06 RX ADMIN — IPRATROPIUM BROMIDE 0.5 MG: 0.5 SOLUTION RESPIRATORY (INHALATION) at 08:11

## 2024-11-06 RX ADMIN — ATORVASTATIN CALCIUM 10 MG: 10 TABLET, FILM COATED ORAL at 21:11

## 2024-11-06 RX ADMIN — METOPROLOL TARTRATE 12.5 MG: 25 TABLET, FILM COATED ORAL at 08:50

## 2024-11-06 RX ADMIN — BUDESONIDE 1 MG: 1 SUSPENSION RESPIRATORY (INHALATION) at 20:10

## 2024-11-06 RX ADMIN — IPRATROPIUM BROMIDE 0.5 MG: 0.5 SOLUTION RESPIRATORY (INHALATION) at 20:10

## 2024-11-06 RX ADMIN — ARFORMOTEROL TARTRATE 15 MCG: 15 SOLUTION RESPIRATORY (INHALATION) at 08:11

## 2024-11-06 RX ADMIN — SODIUM CHLORIDE, PRESERVATIVE FREE 5 ML: 5 INJECTION INTRAVENOUS at 08:52

## 2024-11-06 RX ADMIN — METOPROLOL TARTRATE 12.5 MG: 25 TABLET, FILM COATED ORAL at 21:10

## 2024-11-06 RX ADMIN — BUDESONIDE 1 MG: 1 SUSPENSION RESPIRATORY (INHALATION) at 08:11

## 2024-11-06 RX ADMIN — THERA TABS 1 TABLET: TAB at 08:50

## 2024-11-06 RX ADMIN — ENOXAPARIN SODIUM 40 MG: 100 INJECTION SUBCUTANEOUS at 08:50

## 2024-11-06 RX ADMIN — ARFORMOTEROL TARTRATE 15 MCG: 15 SOLUTION RESPIRATORY (INHALATION) at 20:10

## 2024-11-06 RX ADMIN — OXYCODONE HYDROCHLORIDE AND ACETAMINOPHEN 1 TABLET: 10; 325 TABLET ORAL at 22:07

## 2024-11-06 RX ADMIN — OXYCODONE HYDROCHLORIDE AND ACETAMINOPHEN 1 TABLET: 10; 325 TABLET ORAL at 17:55

## 2024-11-06 RX ADMIN — SODIUM CHLORIDE, PRESERVATIVE FREE 10 ML: 5 INJECTION INTRAVENOUS at 21:11

## 2024-11-06 ASSESSMENT — PAIN DESCRIPTION - ONSET: ONSET: GRADUAL

## 2024-11-06 ASSESSMENT — PAIN SCALES - GENERAL
PAINLEVEL_OUTOF10: 3
PAINLEVEL_OUTOF10: 0
PAINLEVEL_OUTOF10: 7
PAINLEVEL_OUTOF10: 0
PAINLEVEL_OUTOF10: 9
PAINLEVEL_OUTOF10: 0
PAINLEVEL_OUTOF10: 0

## 2024-11-06 ASSESSMENT — PAIN DESCRIPTION - LOCATION: LOCATION: GENERALIZED

## 2024-11-06 ASSESSMENT — PAIN - FUNCTIONAL ASSESSMENT: PAIN_FUNCTIONAL_ASSESSMENT: PREVENTS OR INTERFERES SOME ACTIVE ACTIVITIES AND ADLS

## 2024-11-06 ASSESSMENT — PAIN DESCRIPTION - ORIENTATION: ORIENTATION: ANTERIOR;POSTERIOR

## 2024-11-06 ASSESSMENT — PAIN DESCRIPTION - PAIN TYPE: TYPE: ACUTE PAIN

## 2024-11-06 ASSESSMENT — PAIN DESCRIPTION - DESCRIPTORS: DESCRIPTORS: ACHING;DISCOMFORT

## 2024-11-06 ASSESSMENT — PAIN DESCRIPTION - FREQUENCY: FREQUENCY: INTERMITTENT

## 2024-11-06 NOTE — PROGRESS NOTES
nonspecific. No clearly suspicious osseous lesions. Attention on follow-up. Electronically signed by Britt Osborn    XR CHEST PORTABLE    Result Date: 10/29/2024  No change of the left base tube thoracostomy. No pneumothorax. Persistent patchy opacity and peripheral patchy opacities are consistent with consolidation/atelectasis. Again noted is somewhat masslike opacity in the lateral apex. The right lung is clear. Electronically signed by Kaiser Kevin    XR CHEST PORTABLE    Result Date: 10/28/2024  Left base tube thoracostomy. No pneumothorax. Persistent peripheral patchy consolidation in the left lung, apex to mid lung with left base opacification consistent with atelectasis/infiltrate and possible residual effusion, not significantly changed. Again noted is the somewhat masslike consolidation in the lateral left apex noted on prior CTA, not significantly changed. Electronically signed by Kaiser Kevin      Current Medications:  Current Facility-Administered Medications: ipratropium (ATROVENT) 0.02 % nebulizer solution 0.5 mg, 0.5 mg, Nebulization, BID  metoprolol tartrate (LOPRESSOR) tablet 12.5 mg, 12.5 mg, Oral, BID  oxyCODONE-acetaminophen (PERCOCET)  MG per tablet 1 tablet, 1 tablet, Oral, Q4H PRN  diatrizoate meglumine-sodium (GASTROGRAFIN) 66-10 % solution 30 mL, 30 mL, Oral, ONCE PRN  multivitamin 1 tablet, 1 tablet, Oral, Daily  sodium chloride flush 0.9 % injection 5-40 mL, 5-40 mL, IntraVENous, 2 times per day  sodium chloride flush 0.9 % injection 5-40 mL, 5-40 mL, IntraVENous, PRN  0.9 % sodium chloride infusion, , IntraVENous, PRN  potassium chloride (KLOR-CON M) extended release tablet 40 mEq, 40 mEq, Oral, PRN **OR** potassium bicarb-citric acid (EFFER-K) effervescent tablet 40 mEq, 40 mEq, Oral, PRN **OR** potassium chloride 10 mEq/100 mL IVPB (Peripheral Line), 10 mEq, IntraVENous, PRN  magnesium sulfate 2000 mg in 50 mL IVPB premix, 2,000 mg, IntraVENous, PRN  enoxaparin (LOVENOX)  effusion  #Developing tension hydrothorax  #Acute hypoxic respiratory failure. 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  #Cachexia    Chronic:  #Hypertension  #Hyperlipidemia  #Tobacco abuse).     Plan:   Consults: oncology.  Regular diet.   (    - Pulm signed off on 11/2  - PT/OT, out of bed to chair, ambulation as tolerated.    - Per pulm, can start Trelegy Ellipta 200 mcg daily along with albuterol HFA as needed and DuoNebs.   - Await results of PD-L1 testing on lung biopsy specimen.  - Patient will follow up with oncology outpatient next week to review lab work and discuss palliative chemotherapy versus immunotherapy versus chemoimmunotherapy.    ).       Case discussed with:  [x]Patient  []Family  [x]Nursing  []Case Management  DVT Prophylaxis:  [x]Lovenox  []Hep SQ  []SCDs  []Coumadin   []Heparin gtt   []Xarelto   []Eliquis    Redd Redmond DO, MBA, MS  [facility]  Hospitalist

## 2024-11-06 NOTE — PLAN OF CARE
Problem: Physical Therapy - Adult  Goal: By Discharge: Performs mobility at highest level of function for planned discharge setting.  See evaluation for individualized goals.  Description: Initiated  10/25/24  to be met within 7-10 days.    1.  Patient will move from supine to sit and sit to supine , scoot up and down, and roll side to side in bed with modified independence.    2.  Patient will transfer from bed to chair and chair to bed with modified independence using the least restrictive device.  3.  Patient will perform sit to stand with modified independence.  4.  Patient will ambulate with modified independence for 150 feet with the least restrictive device.   5.  Patient will ascend/descend 2 stairs with b/l handrail(s) with modified independence.    PLOF: Pt lives alone in a single story home, 2 KIRILL with handrails. Pt was independent with all mobility, no AD.    Outcome: Completed   PHYSICAL THERAPY TREATMENT/DISCHARGE    Patient: Delfin Shetty (68 y.o. male)  Date: 11/6/2024  Diagnosis: Hypokalemia [E87.6]  Empyema lung (HCC) [J86.9]  Bullous emphysema (HCC) [J43.9]  Acute respiratory failure with hypoxemia [J96.01]  Sepsis due to pneumonia (HCC) [J18.9, A41.9]  Pneumonia of left lung due to infectious organism, unspecified part of lung [J18.9]  Large pleural effusion [J90] Severe sepsis (HCC)  Precautions: Fall Risk  ASSESSMENT:  Seated in bed. Transferred rooms; set-up new room to allow patient OOB activity as was in other room. Educated patient on need to continue OOB to chair and bedside commode. Mod I for supine to sit and sit to stand. Amb 175ft with ww and supervision. On 2L O2. Supervision for line management. HR elevated to 131 bpm with amb. HR 119bpm at rest, returned to 12 bpm post amb.  Seated in recliner at end of session.  Call bell in reach.     Further skilled physical therapy is not indicated at this time.     PLAN:  Maximum therapeutic gains met at current level of care and patient

## 2024-11-06 NOTE — PROGRESS NOTES
4 Eyes Skin Assessment     NAME:  Delfin Shetty  YOB: 1955  MEDICAL RECORD NUMBER:  [id number]    The patient is being assessed for  Shift Handoff    I agree that at least one RN has performed a thorough Head to Toe Skin Assessment on the patient. ALL assessment sites listed below have been assessed.      Areas assessed by both nurses:    Head, Face, Ears, Shoulders, Back, Chest, Arms, Elbows, Hands, Sacrum. Buttock, Coccyx, Ischium, Legs. Feet and Heels, and Under Medical Devices         Does the Patient have a Wound? Yes wound(s) were present on assessment. LDA wound assessment was Initiated and completed by RN     L flank s/p chest tube removal.   Valentin Prevention initiated by RN: Yes  Wound Care Orders initiated by RN: No    Pressure Injury (Stage 3,4, Unstageable, DTI, NWPT, and Complex wounds) if present, place Wound referral order by RN under : No    New Ostomies, if present place, Ostomy referral order under : No     Nurse 1 eSignature: Electronically signed by BEL PALOMINO RN on 11/6/24 at 8:04 AM EST    **SHARE this note so that the co-signing nurse can place an eSignature**    Nurse 2 eSignature: {Esignature:[id number]}

## 2024-11-06 NOTE — CARE COORDINATION
FERNY contacted [facility], asked to speak with the patient case justin Martinez  [phone]. The  stated she is in a meeting and will not be available until 1:30 pm.     FERNY stating the reason for her call. FERNY will follow up around 1:30 pm.        Donovan Paul, MSW     [phone]

## 2024-11-06 NOTE — PROGRESS NOTES
4 Eyes Skin Assessment     NAME:  Delfin Shetty  YOB: 1955  MEDICAL RECORD NUMBER:  [id number]    The patient is being assessed for  Shift Handoff    I agree that at least one RN has performed a thorough Head to Toe Skin Assessment on the patient. ALL assessment sites listed below have been assessed.      Areas assessed by both nurses:    Head, Face, Ears, Shoulders, Back, Chest, Arms, Elbows, Hands, Sacrum. Buttock, Coccyx, Ischium, and Legs. Feet and Heels        Does the Patient have a Wound? Yes wound(s) were present on assessment. LDA wound assessment was Initiated and completed by RN  -Incision to left flank from chest tube site.       Nurse 1 eSignature: Electronically signed by Gaby Garcia RN on 11/5/24 at 8:15 PM EST    **SHARE this note so that the co-signing nurse can place an eSignature**    Nurse 2 eSignature: Electronically signed by BEL PALOMINO RN on 11/6/24 at 6:59 AM EST

## 2024-11-06 NOTE — PLAN OF CARE
Problem: Occupational Therapy - Adult  Goal: By Discharge: Performs self-care activities at highest level of function for planned discharge setting.  See evaluation for individualized goals.  Description: Occupational Therapy Goals:  Initiated 10/25/2024, re-evaluated 10/31/2024, continue goals to be met within 7-10 days.    1.  Patient will perform grooming with supervision/set-up standing at sink with good balance.   2.  Patient will perform bathing with supervision/set-up.  3.  Patient will perform lower body dressing with supervision/set-up.  4.  Patient will perform toilet transfers with supervision/set-up  5.  Patient will perform all aspects of toileting with supervision/set-up.  6.  Patient will participate in upper extremity therapeutic exercise/activities with supervision/set-up for 8-10 minutes to increase strength/endurance for ADLs.    7.  Patient will utilize energy conservation techniques during functional activities with verbal cues.    PLOF: Pt lives alone in shed, 2 steps to enter, tub only, previously independent with ADLs.      11/6/2024 1459 by Yessi Murrieta OTA  Outcome: Progressing  OCCUPATIONAL THERAPY TREATMENT    Patient: Delfin Shetty (68 y.o. male)  Date: 11/6/2024  Diagnosis: Hypokalemia [E87.6]  Empyema lung (HCC) [J86.9]  Bullous emphysema (HCC) [J43.9]  Acute respiratory failure with hypoxemia [J96.01]  Sepsis due to pneumonia (HCC) [J18.9, A41.9]  Pneumonia of left lung due to infectious organism, unspecified part of lung [J18.9]  Large pleural effusion [J90] Severe sepsis (HCC)      Precautions: General Precautions, Fall Risk    Chart, occupational therapy assessment, plan of care, and goals were reviewed.  ASSESSMENT:  Pt seen for functional mobility training and dynamic standing activity for carryover w/ADLs. Pt requires min vc's for O2 tubing mgt w/functional mobility and demonstrates good dynamic standing balance w/ADL grooming task, standing sinkside. Pt requires  seated rest break s/p standing activity 2/2 c/o SOB. SpO2% > 90 on 2L O2 nc. HR 130s w/standing activity. Reviewed importance OOB and encouraged OOB for all meals.     Progression toward goals:  [x]          Improving appropriately and progressing toward goals  []          Improving slowly and progressing toward goals  []          Not making progress toward goals and plan of care will be adjusted     PLAN:  Patient continues to benefit from skilled intervention to address the above impairments.  Continue treatment per established plan of care.    Further Equipment Recommendations for Discharge:  assistive device as determined by PT    AMPA: AM-[facility] Inpatient Daily Activity Raw Score: 17    Current research shows that an -PAC score of 17 or less is not associated with a discharge to the patient's home setting.    This [facility] score should be considered in conjunction with interdisciplinary team recommendations to determine the most appropriate discharge setting. Patient's social support, diagnosis, medical stability, and prior level of function should also be taken into consideration.     SUBJECTIVE:   Patient stated, \"I did a lot of walking around with PT.\"    OBJECTIVE DATA SUMMARY:   Cognitive/Behavioral Status:  Orientation  Orientation Level: Oriented to place;Oriented to person    Functional Mobility and Transfers for ADLs:   Bed Mobility:  Bed Mobility Training  Bed Mobility Training: Yes  Supine to Sit: Modified independent  Sit to Supine: Modified independent  Scooting: Modified independent     Transfers:  Transfer Training  Transfer Training: Yes  Sit to Stand: Supervision  Stand to Sit: Modified independent    Balance:  Balance  Sitting: Intact  Standing: Intact;With support  Standing - Static: Fair (fair plus)  Standing - Dynamic: Fair (fair plus)    ADL Intervention:  Grooming: Stand by assistance  Hand hygiene standing sinkside    Functional Mobility: Stand by assistance w/RW    Pain:  Intensity

## 2024-11-06 NOTE — PLAN OF CARE
Problem: Pain  Goal: Verbalizes/displays adequate comfort level or baseline comfort level  Description: Monitoring pain level and giving pain medication as needed  Outcome: Progressing  Flowsheets  Taken 11/6/2024 0109  Verbalizes/displays adequate comfort level or baseline comfort level:   Encourage patient to monitor pain and request assistance   Assess pain using appropriate pain scale  Taken 11/5/2024 2049  Verbalizes/displays adequate comfort level or baseline comfort level:   Encourage patient to monitor pain and request assistance   Assess pain using appropriate pain scale  Note: Patient endorses pain to the L flank s/p chest tube removal. Patient received dose of Oxycodone and reported his pain resolved. Patient will continue to be monitored and reassessed for pain. Educated patient on potential side effects and to report any dizziness or lightheadedness.     Problem: Safety - Adult  Goal: Free from fall injury  Description: Gripper socks, call bell in reach  Outcome: Progressing  Free From Fall Injury: Instruct family/caregiver on patient safety  Note: Patient will remain free of injuries or falls during this admission while implementing safety interventions. Safety interventions including wearing non-skid yellow socks at all times, calling for assistance, and safe exit side of the bed when applicable. Patient agreeable to safety interventions in place.     Problem: Skin/Tissue Integrity - Adult  Goal: Skin integrity remains intact  Outcome: Progressing  Flowsheets  Taken 11/6/2024 0109  Skin Integrity Remains Intact: Monitor for areas of redness and/or skin breakdown  Taken 11/5/2024 2010  Skin Integrity Remains Intact:   Monitor for areas of redness and/or skin breakdown   Assess vascular access sites hourly  Note: Patient will remain free of any wounds or pressure injuries acquired during this admission. Patient has a L flank surgical site from a chest tube that has a dressing intact. Educated patient on

## 2024-11-07 PROCEDURE — 97110 THERAPEUTIC EXERCISES: CPT

## 2024-11-07 PROCEDURE — 94640 AIRWAY INHALATION TREATMENT: CPT

## 2024-11-07 PROCEDURE — 6360000002 HC RX W HCPCS: Performed by: PHYSICIAN ASSISTANT

## 2024-11-07 PROCEDURE — 2580000003 HC RX 258: Performed by: PHYSICIAN ASSISTANT

## 2024-11-07 PROCEDURE — 6360000002 HC RX W HCPCS: Performed by: STUDENT IN AN ORGANIZED HEALTH CARE EDUCATION/TRAINING PROGRAM

## 2024-11-07 PROCEDURE — 99232 SBSQ HOSP IP/OBS MODERATE 35: CPT | Performed by: STUDENT IN AN ORGANIZED HEALTH CARE EDUCATION/TRAINING PROGRAM

## 2024-11-07 PROCEDURE — 94761 N-INVAS EAR/PLS OXIMETRY MLT: CPT

## 2024-11-07 PROCEDURE — 6370000000 HC RX 637 (ALT 250 FOR IP): Performed by: PHYSICIAN ASSISTANT

## 2024-11-07 PROCEDURE — 1100000003 HC PRIVATE W/ TELEMETRY

## 2024-11-07 PROCEDURE — 97530 THERAPEUTIC ACTIVITIES: CPT

## 2024-11-07 PROCEDURE — 6370000000 HC RX 637 (ALT 250 FOR IP): Performed by: STUDENT IN AN ORGANIZED HEALTH CARE EDUCATION/TRAINING PROGRAM

## 2024-11-07 PROCEDURE — 94669 MECHANICAL CHEST WALL OSCILL: CPT

## 2024-11-07 PROCEDURE — 2700000000 HC OXYGEN THERAPY PER DAY

## 2024-11-07 RX ADMIN — ATORVASTATIN CALCIUM 10 MG: 10 TABLET, FILM COATED ORAL at 21:10

## 2024-11-07 RX ADMIN — IPRATROPIUM BROMIDE 0.5 MG: 0.5 SOLUTION RESPIRATORY (INHALATION) at 07:40

## 2024-11-07 RX ADMIN — SODIUM CHLORIDE, PRESERVATIVE FREE 10 ML: 5 INJECTION INTRAVENOUS at 21:11

## 2024-11-07 RX ADMIN — ENOXAPARIN SODIUM 40 MG: 100 INJECTION SUBCUTANEOUS at 08:10

## 2024-11-07 RX ADMIN — BUDESONIDE 1 MG: 1 SUSPENSION RESPIRATORY (INHALATION) at 20:14

## 2024-11-07 RX ADMIN — BUDESONIDE 1 MG: 1 SUSPENSION RESPIRATORY (INHALATION) at 07:40

## 2024-11-07 RX ADMIN — IPRATROPIUM BROMIDE 0.5 MG: 0.5 SOLUTION RESPIRATORY (INHALATION) at 20:13

## 2024-11-07 RX ADMIN — ARFORMOTEROL TARTRATE 15 MCG: 15 SOLUTION RESPIRATORY (INHALATION) at 07:40

## 2024-11-07 RX ADMIN — METOPROLOL TARTRATE 12.5 MG: 25 TABLET, FILM COATED ORAL at 08:09

## 2024-11-07 RX ADMIN — ARFORMOTEROL TARTRATE 15 MCG: 15 SOLUTION RESPIRATORY (INHALATION) at 20:14

## 2024-11-07 RX ADMIN — METOPROLOL TARTRATE 12.5 MG: 25 TABLET, FILM COATED ORAL at 21:10

## 2024-11-07 RX ADMIN — SODIUM CHLORIDE, PRESERVATIVE FREE 10 ML: 5 INJECTION INTRAVENOUS at 08:10

## 2024-11-07 RX ADMIN — THERA TABS 1 TABLET: TAB at 08:09

## 2024-11-07 RX ADMIN — OXYCODONE HYDROCHLORIDE AND ACETAMINOPHEN 1 TABLET: 10; 325 TABLET ORAL at 21:10

## 2024-11-07 ASSESSMENT — PAIN DESCRIPTION - FREQUENCY
FREQUENCY: INTERMITTENT
FREQUENCY: INTERMITTENT

## 2024-11-07 ASSESSMENT — PAIN SCALES - GENERAL
PAINLEVEL_OUTOF10: 0
PAINLEVEL_OUTOF10: 7
PAINLEVEL_OUTOF10: 0
PAINLEVEL_OUTOF10: 7
PAINLEVEL_OUTOF10: 0

## 2024-11-07 ASSESSMENT — PAIN DESCRIPTION - ONSET
ONSET: PROGRESSIVE
ONSET: PROGRESSIVE

## 2024-11-07 ASSESSMENT — PAIN DESCRIPTION - DESCRIPTORS
DESCRIPTORS: ACHING;DISCOMFORT
DESCRIPTORS: DISCOMFORT

## 2024-11-07 ASSESSMENT — PAIN DESCRIPTION - PAIN TYPE
TYPE: ACUTE PAIN
TYPE: ACUTE PAIN

## 2024-11-07 ASSESSMENT — PAIN DESCRIPTION - LOCATION
LOCATION: BACK;GENERALIZED
LOCATION: BACK

## 2024-11-07 ASSESSMENT — PAIN DESCRIPTION - ORIENTATION
ORIENTATION: POSTERIOR
ORIENTATION: LEFT

## 2024-11-07 NOTE — CARE COORDINATION
FERNY called Mrs. Martinez  @ [facility] [phone]. FERNY left a  requesting a call back.      FERNY asked [employer] to assist her with establishing a contract with [facility].       Donovan Paul, MSW     [phone]

## 2024-11-07 NOTE — PROGRESS NOTES
[facility] Hospitalist Group  Progress Note  Date:2024       Room:[facility]  Patient Name:Delfin Shetty     YOB: 1955     Age:68 y.o.        Subjective    Subjective:  Symptoms:  Stable.  No shortness of breath or chest pain.    Diet:  Adequate intake.    Activity level: Normal.    Pain:  He reports no pain.       Review of Systems   Respiratory:  Negative for shortness of breath.    Cardiovascular:  Negative for chest pain.       No acute events overnight.  No complaints. Good appeitite.    Disposition: Medically stable for discharge. Has acceptance at LT. Pending Medicaid.    Objective         Vitals Last 24 Hours:  TEMPERATURE:  Temp  Av.5 °F (36.9 °C)  Min: 98.1 °F (36.7 °C)  Max: 99.1 °F (37.3 °C)  RESPIRATIONS RANGE: Resp  Av.8  Min: 16  Max: 20  PULSE OXIMETRY RANGE: SpO2  Av.9 %  Min: 92 %  Max: 100 %  PULSE RANGE: Pulse  Av.8  Min: 108  Max: 130  BLOOD PRESSURE RANGE: Systolic (24hrs), Av , Min:109 , Max:132     ; Diastolic (24hrs), Av, Min:59, Max:70      I/O (24Hr):    Intake/Output Summary (Last 24 hours) at 2024 1710  Last data filed at 2024  Gross per 24 hour   Intake 480 ml   Output --   Net 480 ml     Objective:  General Appearance:  Comfortable.    Vital signs: (most recent): Blood pressure 132/67, pulse (!) 128, temperature 99.1 °F (37.3 °C), temperature source Oral, resp. rate 20, height 1.702 m (5' 7.01\"), weight 57.9 kg (127 lb 10.3 oz), SpO2 92%.    Output: Producing urine.    HEENT: Normal HEENT exam.    Lungs:  Normal effort and normal respiratory rate.  Breath sounds clear to auscultation.  (On nasal cannula)  Heart: Normal rate.  Regular rhythm.    Chest: (Mediport in place)  Abdomen: Abdomen is soft and flat.  Bowel sounds are normal.   There is no abdominal tenderness.     Extremities: Normal range of motion.    Pulses: Distal pulses are intact.    Neurological: Patient is alert and oriented to  disintegrating tablet 4 mg, 4 mg, Oral, Q8H PRN **OR** ondansetron (ZOFRAN) injection 4 mg, 4 mg, IntraVENous, Q6H PRN  polyethylene glycol (GLYCOLAX) packet 17 g, 17 g, Oral, Daily PRN  bisacodyl (DULCOLAX) suppository 10 mg, 10 mg, Rectal, Daily PRN  acetaminophen (TYLENOL) tablet 650 mg, 650 mg, Oral, Q6H PRN **OR** acetaminophen (TYLENOL) suppository 650 mg, 650 mg, Rectal, Q6H PRN  atorvastatin (LIPITOR) tablet 10 mg, 10 mg, Oral, Nightly  budesonide (PULMICORT) nebulizer suspension 1 mg, 1 mg, Nebulization, BID  arformoterol tartrate (BROVANA) nebulizer solution 15 mcg, 15 mcg, Nebulization, BID RT      Assessment//Plan           Hospital Problems             Last Modified POA    * (Principal) Severe sepsis (HCC) 10/24/2024 Yes    Primary hypertension 10/23/2024 Yes    HLD (hyperlipidemia) 10/23/2024 Yes    Acute respiratory failure with hypoxia 10/24/2024 Yes    Hypokalemia 10/23/2024 Yes    Eosinophilia 10/23/2024 Yes    Thrombocytosis 10/23/2024 Yes    Bullous emphysema (HCC) 10/24/2024 Yes    Empyema lung (HCC) 10/24/2024 Yes    Large pleural effusion 10/24/2024 Yes    Pneumonia of left lung due to infectious organism 10/24/2024 Yes    Acute respiratory failure with hypoxemia 10/25/2024 Yes    Sepsis due to pneumonia (HCC) 10/26/2024 Yes    Severe protein-calorie malnutrition (HCC) 10/28/2024 Yes    Carcinoma of left lung (HCC) 10/29/2024 Yes    Malignant pleural effusion 11/1/2024 Yes     Assessment:    Condition: In stable condition.  Unchanged.   (    #Large malignant pleural effusion.  Status post CT-guided chest tube thoracostomy, lytics. Pleural effusion studies are exudative, lymphocytic and eosinophilic consistent with malignant effusion. Metasesis to left hilar node. MRI brain neg for mets  #Non-small cell Squamous cell carcinoma, poorly differentiated  #Eosinophilia  #Severe atelectasis secondary to pleural effusion  #Developing tension hydrothorax  #Acute hypoxic respiratory failure. On 2L

## 2024-11-07 NOTE — PLAN OF CARE
Problem: Discharge Planning  Goal: Discharge to home or other facility with appropriate resources  Outcome: Progressing  Flowsheets (Taken 11/7/2024 0809)  Discharge to home or other facility with appropriate resources:   Identify barriers to discharge with patient and caregiver   Arrange for needed discharge resources and transportation as appropriate     Problem: Pain  Goal: Verbalizes/displays adequate comfort level or baseline comfort level  Description: Monitoring pain level and giving pain medication as needed  Outcome: Progressing  Flowsheets (Taken 11/7/2024 0406 by Alona Suarez, RN)  Verbalizes/displays adequate comfort level or baseline comfort level: Encourage patient to monitor pain and request assistance  Note: Patient will remain pain free throughout admission. Appropriate pain scale will be utilize.     Problem: Safety - Adult  Goal: Free from fall injury  Description: Gripper socks, call bell in reach  Outcome: Progressing  Flowsheets (Taken 11/7/2024 0406 by Alona Suarez, RN)  Free From Fall Injury: Instruct family/caregiver on patient safety  Note: Patient will remain free of falls. Bed in lowest position, gripper socks on feet, call light in reach.      Problem: Skin/Tissue Integrity  Description: Gripper socks, call bell in reach  Goal: Absence of new skin breakdown  Description: 1.  Monitor for areas of redness and/or skin breakdown  2.  Assess vascular access sites hourly  3.  Every 4-6 hours minimum:  Change oxygen saturation probe site  4.  Every 4-6 hours:  If on nasal continuous positive airway pressure, respiratory therapy assess nares and determine need for appliance change or resting period.  Outcome: Progressing     Problem: Respiratory - Adult  Description: Monitoring oxygen level and respiratory rate  Goal: Achieves optimal ventilation and oxygenation  Outcome: Progressing  Flowsheets (Taken 11/7/2024 0809)  Achieves optimal ventilation and oxygenation:   Assess for changes in

## 2024-11-07 NOTE — CARE COORDINATION
11/07/24 1205   /Social Work Whiteboard Notes   /Social Work Whiteboard Red: 11/7- SW has made several attempts,  to reach  to expedite the application process. SW  reached out to legal Uma Chavez to construct a LTC contract with the facility [facility]. -TV     SW reached out to [employer] via email. SW received a response back stating they can assist with reaching out to DSS on behalf of the patient.       Donovan Paul, PATRICK     [phone]

## 2024-11-07 NOTE — PLAN OF CARE
Problem: Occupational Therapy - Adult  Goal: By Discharge: Performs self-care activities at highest level of function for planned discharge setting.  See evaluation for individualized goals.  Description: Occupational Therapy Goals:  Initiated 10/25/2024, re-evaluated 10/31/2024, continue goals to be met within 7-10 days.    1.  Patient will perform grooming with supervision/set-up standing at sink with good balance.   2.  Patient will perform bathing with supervision/set-up.  3.  Patient will perform lower body dressing with supervision/set-up.  4.  Patient will perform toilet transfers with supervision/set-up  5.  Patient will perform all aspects of toileting with supervision/set-up.  6.  Patient will participate in upper extremity therapeutic exercise/activities with supervision/set-up for 8-10 minutes to increase strength/endurance for ADLs.    7.  Patient will utilize energy conservation techniques during functional activities with verbal cues.    PLOF: Pt lives alone in shed, 2 steps to enter, tub only, previously independent with ADLs.      Outcome: Progressing   OCCUPATIONAL THERAPY TREATMENT    Patient: Delfin Shetty (68 y.o. male)  Date: 11/7/2024  Diagnosis: Hypokalemia [E87.6]  Empyema lung (HCC) [J86.9]  Bullous emphysema (HCC) [J43.9]  Acute respiratory failure with hypoxemia [J96.01]  Sepsis due to pneumonia (HCC) [J18.9, A41.9]  Pneumonia of left lung due to infectious organism, unspecified part of lung [J18.9]  Large pleural effusion [J90] Severe sepsis (HCC)      Precautions: General Precautions, Fall Risk,  ,  ,  ,  ,  ,  ,      Chart, occupational therapy assessment, plan of care, and goals were reviewed.  ASSESSMENT:  Pt presents in bed with increased SOB stating he has just gotten back in bed from toileting. Pt agrees to xfer to chair with S using RW. Pt participates in there ex to increase strength, endurance and activity tolerance for ADL carryover. Pt requires frequent rest breaks due

## 2024-11-07 NOTE — PLAN OF CARE
Problem: Pain  Goal: Verbalizes/displays adequate comfort level or baseline comfort level  Description: Monitoring pain level and giving pain medication as needed  Outcome: Progressing  Flowsheets  Taken 11/7/2024 0406  Verbalizes/displays adequate comfort level or baseline comfort level: Encourage patient to monitor pain and request assistance  Taken 11/6/2024 2237  Verbalizes/displays adequate comfort level or baseline comfort level:   Encourage patient to monitor pain and request assistance   Assess pain using appropriate pain scale  Taken 11/6/2024 2207  Verbalizes/displays adequate comfort level or baseline comfort level:   Assess pain using appropriate pain scale   Encourage patient to monitor pain and request assistance  Taken 11/6/2024 2015  Verbalizes/displays adequate comfort level or baseline comfort level:   Encourage patient to monitor pain and request assistance   Assess pain using appropriate pain scale  Note: Patient will remain at an appropriate pain level during admission with interventions including prn pain medications. Patient endorsed pain to the L flank s/p chest tube removal and generalized discomfort, patient received prn Oxycodone which he states relieved his pain. Patient will continue to be reassessed for pain.     Problem: Safety - Adult  Goal: Free from fall injury  Description: Gripper socks, call bell in reach  Outcome: Progressing  Flowsheets (Taken 11/7/2024 0406)  Free From Fall Injury: Instruct family/caregiver on patient safety  Note: Patient will remain free of falls or injuries during admission. Educated patient on appropriate safety interventions including wearing non-skid yellow socks at all times, using call light for assistance, and safe transfers with staff present. Patient agreeable to safety interventions.      Problem: Respiratory - Adult  Goal: Achieves optimal ventilation and oxygenation  Outcome: Progressing  Flowsheets  Taken 11/7/2024 0406  Achieves optimal  ventilation and oxygenation:   Assess for changes in mentation and behavior   Assess for changes in respiratory status  Taken 11/6/2024 2030  Achieves optimal ventilation and oxygenation:   Assess for changes in respiratory status   Assess for changes in mentation and behavior  Note: Patient will remain above oxygen goal of 88% using appropriate oxygen interventions. Patient is currently on 2L nasal cannula and saturations are 93% and higher, patient denies any shortness of breath.

## 2024-11-07 NOTE — PROGRESS NOTES
4 Eyes Skin Assessment     NAME:  Delfin Shetty  YOB: 1955  MEDICAL RECORD NUMBER:  [id number]    The patient is being assessed for  Shift Handoff    I agree that at least one RN has performed a thorough Head to Toe Skin Assessment on the patient. ALL assessment sites listed below have been assessed.      Areas assessed by both nurses:    Head, Face, Ears, Shoulders, Back, Chest, Arms, Elbows, Hands, Sacrum. Buttock, Coccyx, Ischium, Legs. Feet and Heels, and Under Medical Devices         Does the Patient have a Wound? Yes wound(s) were present on assessment. LDA wound assessment was Initiated and completed by RN     -Incision to left flank from chest tube site.     Valentin Prevention initiated by RN: Yes  Wound Care Orders initiated by RN: No    Pressure Injury (Stage 3,4, Unstageable, DTI, NWPT, and Complex wounds) if present, place Wound referral order by RN under : No    New Ostomies, if present place, Ostomy referral order under : No     Nurse 1 eSignature: Electronically signed by BEL PALOMINO RN on 11/7/24 at 8:25 AM EST    **SHARE this note so that the co-signing nurse can place an eSignature**    Nurse 2 eSignature: {Esignature:[id number]}

## 2024-11-07 NOTE — PROGRESS NOTES
0809: Bedside shift change report given to ASHLEY Soria (oncoming nurse) by ASHLEY Sage (offgoing nurse). Report included the following information Nurse Handoff Report, Adult Overview, Intake/Output, MAR, and Cardiac Rhythm Sinus Tach .     0809: 4 Eyes Skin Assessment     NAME:  Delfin Shetty  YOB: 1955  MEDICAL RECORD NUMBER:  [id number]    The patient is being assessed for  Shift Handoff    I agree that at least one RN has performed a thorough Head to Toe Skin Assessment on the patient. ALL assessment sites listed below have been assessed.      Areas assessed by both nurses:    Head, Face, Ears, Shoulders, Back, Chest, Arms, Elbows, Hands, Sacrum. Buttock, Coccyx, Ischium, Legs. Feet and Heels, and Under Medical Devices         Does the Patient have a Wound? Yes wound(s) were present on assessment. LDA wound assessment was Initiated and completed by RN       Valentin Prevention initiated by RN: Yes  Wound Care Orders initiated by RN: No  Incision to left flank from chest tube placement.    Pressure Injury (Stage 3,4, Unstageable, DTI, NWPT, and Complex wounds) if present, place Wound referral order by RN under : No    New Ostomies, if present place, Ostomy referral order under : No     Nurse 1 eSignature: Electronically signed by Long Soria RN on 11/7/24 at 8:09 AM EST    **SHARE this note so that the co-signing nurse can place an eSignature**    Nurse 2 eSignature: Electronically signed by ASHLEY Sage on 11/7/24 at 8: 09 AM EST    1955: Bedside shift change report given to ASHLEY Sage (oncoming nurse) by ASHLEY Soria (offgoing nurse). Report included the following information Nurse Handoff Report, Adult Overview, Intake/Output, MAR, and Cardiac Rhythm Sinus Tach .     1955; 4 Eyes Skin Assessment     NAME:  Delfin Shetty  YOB: 1955  MEDICAL RECORD NUMBER:  [id number]    The patient is being assessed for  Shift Handoff    I agree that at least one RN has

## 2024-11-08 PROCEDURE — 6370000000 HC RX 637 (ALT 250 FOR IP): Performed by: PHYSICIAN ASSISTANT

## 2024-11-08 PROCEDURE — 1100000000 HC RM PRIVATE

## 2024-11-08 PROCEDURE — 2700000000 HC OXYGEN THERAPY PER DAY

## 2024-11-08 PROCEDURE — 94669 MECHANICAL CHEST WALL OSCILL: CPT

## 2024-11-08 PROCEDURE — 99231 SBSQ HOSP IP/OBS SF/LOW 25: CPT | Performed by: STUDENT IN AN ORGANIZED HEALTH CARE EDUCATION/TRAINING PROGRAM

## 2024-11-08 PROCEDURE — 2580000003 HC RX 258: Performed by: PHYSICIAN ASSISTANT

## 2024-11-08 PROCEDURE — 6360000002 HC RX W HCPCS: Performed by: PHYSICIAN ASSISTANT

## 2024-11-08 PROCEDURE — 6360000002 HC RX W HCPCS: Performed by: STUDENT IN AN ORGANIZED HEALTH CARE EDUCATION/TRAINING PROGRAM

## 2024-11-08 PROCEDURE — 6370000000 HC RX 637 (ALT 250 FOR IP): Performed by: STUDENT IN AN ORGANIZED HEALTH CARE EDUCATION/TRAINING PROGRAM

## 2024-11-08 PROCEDURE — 94640 AIRWAY INHALATION TREATMENT: CPT

## 2024-11-08 PROCEDURE — 94761 N-INVAS EAR/PLS OXIMETRY MLT: CPT

## 2024-11-08 RX ADMIN — ATORVASTATIN CALCIUM 10 MG: 10 TABLET, FILM COATED ORAL at 21:52

## 2024-11-08 RX ADMIN — IPRATROPIUM BROMIDE 0.5 MG: 0.5 SOLUTION RESPIRATORY (INHALATION) at 19:52

## 2024-11-08 RX ADMIN — ARFORMOTEROL TARTRATE 15 MCG: 15 SOLUTION RESPIRATORY (INHALATION) at 07:02

## 2024-11-08 RX ADMIN — BUDESONIDE 1 MG: 1 SUSPENSION RESPIRATORY (INHALATION) at 19:52

## 2024-11-08 RX ADMIN — OXYCODONE HYDROCHLORIDE AND ACETAMINOPHEN 1 TABLET: 10; 325 TABLET ORAL at 19:13

## 2024-11-08 RX ADMIN — IPRATROPIUM BROMIDE 0.5 MG: 0.5 SOLUTION RESPIRATORY (INHALATION) at 07:02

## 2024-11-08 RX ADMIN — THERA TABS 1 TABLET: TAB at 08:55

## 2024-11-08 RX ADMIN — ARFORMOTEROL TARTRATE 15 MCG: 15 SOLUTION RESPIRATORY (INHALATION) at 19:52

## 2024-11-08 RX ADMIN — SODIUM CHLORIDE, PRESERVATIVE FREE 10 ML: 5 INJECTION INTRAVENOUS at 08:56

## 2024-11-08 RX ADMIN — METOPROLOL TARTRATE 12.5 MG: 25 TABLET, FILM COATED ORAL at 07:58

## 2024-11-08 RX ADMIN — SODIUM CHLORIDE, PRESERVATIVE FREE 10 ML: 5 INJECTION INTRAVENOUS at 21:53

## 2024-11-08 RX ADMIN — METOPROLOL TARTRATE 12.5 MG: 25 TABLET, FILM COATED ORAL at 21:52

## 2024-11-08 RX ADMIN — BUDESONIDE 1 MG: 1 SUSPENSION RESPIRATORY (INHALATION) at 07:02

## 2024-11-08 RX ADMIN — ENOXAPARIN SODIUM 40 MG: 100 INJECTION SUBCUTANEOUS at 08:55

## 2024-11-08 ASSESSMENT — PAIN DESCRIPTION - LOCATION
LOCATION: ABDOMEN
LOCATION: CHEST

## 2024-11-08 ASSESSMENT — PAIN SCALES - GENERAL
PAINLEVEL_OUTOF10: 0
PAINLEVEL_OUTOF10: 0
PAINLEVEL_OUTOF10: 3
PAINLEVEL_OUTOF10: 0
PAINLEVEL_OUTOF10: 0
PAINLEVEL_OUTOF10: 7
PAINLEVEL_OUTOF10: 0

## 2024-11-08 ASSESSMENT — PAIN DESCRIPTION - ORIENTATION: ORIENTATION: MID

## 2024-11-08 ASSESSMENT — PAIN DESCRIPTION - PAIN TYPE: TYPE: ACUTE PAIN

## 2024-11-08 ASSESSMENT — PAIN DESCRIPTION - FREQUENCY: FREQUENCY: INTERMITTENT

## 2024-11-08 ASSESSMENT — PAIN DESCRIPTION - DESCRIPTORS: DESCRIPTORS: ACHING

## 2024-11-08 ASSESSMENT — PAIN DESCRIPTION - ONSET: ONSET: PROGRESSIVE

## 2024-11-08 NOTE — PROGRESS NOTES
[facility]inos [facility] Hospitalist Group  Progress Note  Date:2024       Room:[facility]  Patient Name:Delfin Shetty     YOB: 1955     Age:68 y.o.        Subjective    Subjective:  Symptoms:  Stable.  No shortness of breath or chest pain.    Diet:  Adequate intake.    Activity level: Normal.    Pain:  He reports no pain.       Review of Systems   Respiratory:  Negative for shortness of breath.    Cardiovascular:  Negative for chest pain.       No acute events overnight.  No complaints. Good appeitite.    Disposition: Medically stable for discharge. Has acceptance at LT. Pending Medicaid.    Objective         Vitals Last 24 Hours:  TEMPERATURE:  Temp  Av.1 °F (37.3 °C)  Min: 98.3 °F (36.8 °C)  Max: 99.9 °F (37.7 °C)  RESPIRATIONS RANGE: Resp  Av  Min: 16  Max: 20  PULSE OXIMETRY RANGE: SpO2  Av.5 %  Min: 90 %  Max: 99 %  PULSE RANGE: Pulse  Av.7  Min: 108  Max: 131  BLOOD PRESSURE RANGE: Systolic (24hrs), Av , Min:107 , Max:136     ; Diastolic (24hrs), Av, Min:55, Max:69      I/O (24Hr):    Intake/Output Summary (Last 24 hours) at 2024 1707  Last data filed at 2024 1100  Gross per 24 hour   Intake 580 ml   Output 950 ml   Net -370 ml     Objective:  General Appearance:  Comfortable.    Vital signs: (most recent): Blood pressure (!) 136/56, pulse (!) 131, temperature 99.7 °F (37.6 °C), temperature source Oral, resp. rate 16, height 1.702 m (5' 7.01\"), weight 56 kg (123 lb 7.3 oz), SpO2 91%.    Output: Producing urine.    HEENT: Normal HEENT exam.    Lungs:  Normal effort and normal respiratory rate.  Breath sounds clear to auscultation.  (On nasal cannula)  Heart: Normal rate.  Regular rhythm.    Chest: (Mediport in place)  Abdomen: Abdomen is soft and flat.  Bowel sounds are normal.   There is no abdominal tenderness.     Extremities: Normal range of motion.    Pulses: Distal pulses are intact.    Neurological: Patient is alert and oriented to  tube 2.  Loculated left pleural effusion unchanged. 3.  Masslike opacity versus consolidation in the left apex unchanged. 4.  Low lung volume with extensive opacities of the left hemithorax unchanged. Electronically signed by Jacob HOLLINGSWORTH Post    CT CHEST ABDOMEN PELVIS W CONTRAST Additional Contrast? Oral    Result Date: 10/30/2024  1. Redemonstration of significant irregular pleural thickening throughout the left hemithorax, greatest at the upper to mid lung. This is consistent with malignancy. Recommend correlation with recent biopsy results. -Confluent soft tissue at the left hilum may represent a mixture of the malignant pleural-based process and metastatic adenopathy. This causes narrowing of multiple pulmonary arteries and veins at the hilum as well as the left mainstem bronchus. This also partially encircles the distal aortic arch and descending thoracic aorta. 2. Significantly decreased size of the left pleural effusion status post chest tube placement. Small left pneumothorax likely related to the chest tube. -Rightward mediastinal shift has improved 3. Two 6 mm nodular densities right upper lobe are nonspecific. Recommend attention on follow-up. 4. Enlarged proximal left hilar node is likely metastatic. Mildly enlarged right hilar node may also be metastatic. Enlarged left subpectoral node and borderline enlarged left axillary node are also concerning for metastatic disease. 5. Indeterminate 1.9 cm left adrenal lesion is concerning for metastasis, though nonspecific. Recommend attention on follow-up. If clinical management would be altered, PET/CT could be used for further assessment. 6. Subcentimeter hypodense lesion in the left hepatic lobe is too small to characterize. Recommend close attention on follow-up. 7. Subcentimeter sclerotic focus right iliac bone is nonspecific. No clearly suspicious osseous lesions. Attention on follow-up. Electronically signed by Britt Osborn    XR CHEST

## 2024-11-08 NOTE — PROGRESS NOTES
Physician Progress Note      PATIENT:               MIGUEL GOODMAN  CSN #:                  [id number]  :                       1955  ADMIT DATE:       10/23/2024 1:03 PM  DISCH DATE:  RESPONDING  PROVIDER #:        Redd Redmond DO          QUERY TEXT:    Pt admitted with severe sepsis.  Pt noted to have pneumonia in H&P on 10/2/24.   If possible, please document in the progress notes and discharge summary if   you are evaluating and/or treating any of the following:    Note: CAP and HCAP indicate where the pneumonia was acquired, not a specific   type.    The medical record reflects the following:    Risk Factors:  69 yo male admitted with severe sepsis    Clinical Indicators:  10/23 H&P: Severe sepsis: RR >30, HR >120, WBC 18.8. Lactic elevated   initially. Procal 0.21. Due to pneumonia/emypema     MD PN: Pneumonia of left lung due to infectious organism 10/24/2024 Yes    Treatment:  Zosyn, vanco, IV fluids, infection disease consult, pulmonary consult, IS, 02   protocol, telemetry monitoring  Options provided:  -- Gram positive pneumonia, present on admission and now resolved  -- Aspiration pneumonia, present on admission and now resolved  -- Other - I will add my own diagnosis  -- Disagree - Not applicable / Not valid  -- Disagree - Clinically unable to determine / Unknown  -- Refer to Clinical Documentation Reviewer    PROVIDER RESPONSE TEXT:    This patient has aspiration pneumonia, present on admission and now resolved    Query created by: Kisha Caban on 2024 10:56 AM      Electronically signed by:  Redd Redmond DO 2024 12:25 PM

## 2024-11-08 NOTE — PROGRESS NOTES
Comprehensive Nutrition Assessment    Type and Reason for Visit:  Reassess    Nutrition Recommendations/Plan:   Continue current diet as tolerated.  Continue Ensure Plus High Protein (each provides 350 kcal, 20g protein) TID  Continue multivitamin supplementation daily  Daily wts.   Continue to monitor tolerance of PO, compliance of oral supplements, weight, labs, and plan of care during admission.     Malnutrition Assessment:  Malnutrition Status:  Severe malnutrition (10/28/24 1133)    Context:  Chronic Illness     Findings of the 6 clinical characteristics of malnutrition:  Energy Intake:  75% or less estimated energy requirements for 1 month or longer  Weight Loss:  Greater than 7.5% over 3 months     Body Fat Loss:  Severe body fat loss Triceps, Buccal region   Muscle Mass Loss:  Severe muscle mass loss Temples (temporalis), Clavicles (pectoralis & deltoids), Thigh (quadriceps), Scapula (trapezius), Hand (interosseous), Calf (gastrocnemius)  Fluid Accumulation:  No significant fluid accumulation     Strength:  Not Performed    Nutrition Assessment:      11/8/24 follow-up:  The patient is currently ordered a regular diet and high kcal/high protein ONS TID. Per chart, patient with good appetite and consumed % of 2 documented meals since the last nutrition assessment. Patient also consumed % of 1 ONS this am. No nausea/emesis, and last documented BM on 11/8. Reviewed documented weights- new weight of 56 kg today, which would indicate 3% significant weight loss over ~1 week.   Meds/labs reviewed.    Pt admitted for management of severe sepsis.   Per flow sheets, meal intake % x 3 entries; no documentation or supplement intake. Note supplement order d/c when diet made NPO 10/30; not added once diet advanced back to regular; plan to order again.    Nutrition Related Findings:    Pertinent Meds:   MVI Pertinent Labs:  Last BMP (11/2/24) reviewed.     Last BM: 11/08/24    Skin: Wound Type:  (chest

## 2024-11-08 NOTE — PLAN OF CARE
Problem: Pain  Goal: Verbalizes/displays adequate comfort level or baseline comfort level  Description: Monitoring pain level and giving pain medication as needed  11/8/2024 0143 by Alona Suarez RN  Outcome: Progressing  Flowsheets  Taken 11/7/2024 2301  Verbalizes/displays adequate comfort level or baseline comfort level:   Encourage patient to monitor pain and request assistance   Assess pain using appropriate pain scale  Taken 11/7/2024 2140  Verbalizes/displays adequate comfort level or baseline comfort level:   Assess pain using appropriate pain scale   Encourage patient to monitor pain and request assistance  Taken 11/7/2024 2110  Verbalizes/displays adequate comfort level or baseline comfort level:   Encourage patient to monitor pain and request assistance   Assess pain using appropriate pain scale  Note: Patient will be assessed on appropriate pain scale. Patient endorsed pain to the L flank where prior chest tube was and has since been removed. Patient received prn oxycodone and stated his pain is resolved. Patient will continue to be reassessed for goal pain of 0.     Problem: Safety - Adult  Goal: Free from fall injury  Description: Gripper socks, call bell in reach  11/8/2024 0143 by Alona Suarez, RN  Outcome: Progressing  Flowsheets (Taken 11/8/2024 0143)  Free From Fall Injury: Instruct family/caregiver on patient safety  Note: Patient will remain free of any falls or injuries during admission. Safety interventions implemented include wearing non-skid yellow socks at all times, using call light for assistance, and safe exit side of the bed when staff is present to assist with transfers. Patient agreeable to safety interventions in place.     Problem: Chronic Conditions and Co-morbidities  Goal: Patient's chronic conditions and co-morbidity symptoms are monitored and maintained or improved  Outcome: Progressing  Flowsheets (Taken 11/8/2024 0149)  Care Plan - Patient's Chronic Conditions and Co-Morbidity

## 2024-11-08 NOTE — PROGRESS NOTES
4 Eyes Skin Assessment     NAME:  Delfin Shetty  YOB: 1955  MEDICAL RECORD NUMBER:  [id number]    The patient is being assessed for  Shift Handoff    I agree that at least one RN has performed a thorough Head to Toe Skin Assessment on the patient. ALL assessment sites listed below have been assessed.      Areas assessed by both nurses:    Head, Face, Ears, Shoulders, Back, Chest, Arms, Elbows, Hands, Sacrum. Buttock, Coccyx, Ischium, Legs. Feet and Heels, and Under Medical Devices         Does the Patient have a Wound? Yes wound(s) were present on assessment. LDA wound assessment was Initiated and completed by RN  L flank s/p chest tube removal       Valentin Prevention initiated by RN: Yes  Wound Care Orders initiated by RN: No    Pressure Injury (Stage 3,4, Unstageable, DTI, NWPT, and Complex wounds) if present, place Wound referral order by RN under : No    New Ostomies, if present place, Ostomy referral order under : No     Nurse 1 eSignature: Electronically signed by BEL PALOMINO RN on 11/8/24 at 7:10 AM EST    **SHARE this note so that the co-signing nurse can place an eSignature**    Nurse 2 eSignature: {Esignature:[id number]}

## 2024-11-08 NOTE — PLAN OF CARE
Problem: Discharge Planning  Goal: Discharge to home or other facility with appropriate resources  Outcome: Progressing     Problem: Pain  Goal: Verbalizes/displays adequate comfort level or baseline comfort level  Description: Monitoring pain level and giving pain medication as needed  11/8/2024 1052 by Devora Kurse RN  Outcome: Progressing  Flowsheets (Taken 11/8/2024 0320 by Alona Suarez RN)  Verbalizes/displays adequate comfort level or baseline comfort level:   Encourage patient to monitor pain and request assistance   Assess pain using appropriate pain scale  11/8/2024 0143 by Alona Suarez RN  Outcome: Progressing  Flowsheets  Taken 11/7/2024 2301  Verbalizes/displays adequate comfort level or baseline comfort level:   Encourage patient to monitor pain and request assistance   Assess pain using appropriate pain scale  Taken 11/7/2024 2140  Verbalizes/displays adequate comfort level or baseline comfort level:   Assess pain using appropriate pain scale   Encourage patient to monitor pain and request assistance  Taken 11/7/2024 2110  Verbalizes/displays adequate comfort level or baseline comfort level:   Encourage patient to monitor pain and request assistance   Assess pain using appropriate pain scale  Note: Patient will be assessed on appropriate pain scale. Patient endorsed pain to the L flank where prior chest tube was and has since been removed. Patient received prn oxycodone and stated his pain is resolved. Patient will continue to be reassessed for goal pain of 0.     Problem: Safety - Adult  Goal: Free from fall injury  Description: Gripper socks, call bell in reach  11/8/2024 1052 by Devora Kruse RN  Outcome: Progressing  11/8/2024 0143 by Alona Suarez RN  Outcome: Progressing  Flowsheets (Taken 11/8/2024 0143)  Free From Fall Injury: Instruct family/caregiver on patient safety  Note: Patient will remain free of any falls or injuries during admission. Safety interventions implemented include  wearing non-skid yellow socks at all times, using call light for assistance, and safe exit side of the bed when staff is present to assist with transfers. Patient agreeable to safety interventions in place.     Problem: Skin/Tissue Integrity  Goal: Absence of new skin breakdown  Description: 1.  Monitor for areas of redness and/or skin breakdown  2.  Assess vascular access sites hourly  3.  Every 4-6 hours minimum:  Change oxygen saturation probe site  4.  Every 4-6 hours:  If on nasal continuous positive airway pressure, respiratory therapy assess nares and determine need for appliance change or resting period.  Outcome: Progressing     Problem: Respiratory - Adult  Goal: Achieves optimal ventilation and oxygenation  Outcome: Progressing     Problem: Cardiovascular - Adult  Goal: Maintains optimal cardiac output and hemodynamic stability  Outcome: Progressing  Goal: Absence of cardiac dysrhythmias or at baseline  Outcome: Progressing     Problem: Skin/Tissue Integrity - Adult  Goal: Skin integrity remains intact  Outcome: Progressing  Goal: Incisions, wounds, or drain sites healing without S/S of infection  Outcome: Progressing  Goal: Oral mucous membranes remain intact  Outcome: Progressing     Problem: Musculoskeletal - Adult  Goal: Return mobility to safest level of function  Outcome: Progressing  Goal: Maintain proper alignment of affected body part  Outcome: Progressing  Goal: Return ADL status to a safe level of function  Outcome: Progressing     Problem: Gastrointestinal - Adult  Goal: Maintains or returns to baseline bowel function  Outcome: Progressing  Goal: Maintains adequate nutritional intake  Outcome: Progressing

## 2024-11-08 NOTE — CARE COORDINATION
FERNY sent the patient clinicals to [facility] Per Martina YIP director. FERNY tried calling [facility] [phone], no one answered FERNY left a VM asking for Mrs. Martinez Supervisor number.     9:24 am     The  called FERNY back, transferred her to Mrs. Martinez supervisor Ms. Gordon. FERNY explained the reasoning for her call and assured FERNY either herself or Ms. Gordon will give SW a call back.       9:55 am    Ms. Martinez called back stated the patient does not have LTC benefits but, she referred FERNY to Ms. Gracia's who can assess for LTC benefits [phone]. Ms. Garcia's is currently out of the office until 11/12. FERNY left a Vm.     When FERNY spoke with the  she stated the worker is not out of the office, however, SW left a VM.     11:33 am    Patient does not qualify for LTC based on PT Score of 22.     PATRICK Osborne     [phone]          PATRICK Osborne     [phone]

## 2024-11-09 ENCOUNTER — APPOINTMENT (OUTPATIENT)
Facility: HOSPITAL | Age: 69
End: 2024-11-09
Payer: MEDICARE

## 2024-11-09 PROCEDURE — 6360000002 HC RX W HCPCS: Performed by: PHYSICIAN ASSISTANT

## 2024-11-09 PROCEDURE — 6370000000 HC RX 637 (ALT 250 FOR IP): Performed by: PHYSICIAN ASSISTANT

## 2024-11-09 PROCEDURE — 6360000002 HC RX W HCPCS: Performed by: STUDENT IN AN ORGANIZED HEALTH CARE EDUCATION/TRAINING PROGRAM

## 2024-11-09 PROCEDURE — 6370000000 HC RX 637 (ALT 250 FOR IP): Performed by: STUDENT IN AN ORGANIZED HEALTH CARE EDUCATION/TRAINING PROGRAM

## 2024-11-09 PROCEDURE — 6370000000 HC RX 637 (ALT 250 FOR IP): Performed by: INTERNAL MEDICINE

## 2024-11-09 PROCEDURE — 71045 X-RAY EXAM CHEST 1 VIEW: CPT

## 2024-11-09 PROCEDURE — 94761 N-INVAS EAR/PLS OXIMETRY MLT: CPT

## 2024-11-09 PROCEDURE — 99233 SBSQ HOSP IP/OBS HIGH 50: CPT | Performed by: INTERNAL MEDICINE

## 2024-11-09 PROCEDURE — 2580000003 HC RX 258: Performed by: PHYSICIAN ASSISTANT

## 2024-11-09 PROCEDURE — 94640 AIRWAY INHALATION TREATMENT: CPT

## 2024-11-09 PROCEDURE — 1100000000 HC RM PRIVATE

## 2024-11-09 PROCEDURE — 2700000000 HC OXYGEN THERAPY PER DAY

## 2024-11-09 RX ORDER — PANTOPRAZOLE SODIUM 40 MG/1
40 TABLET, DELAYED RELEASE ORAL
Status: DISCONTINUED | OUTPATIENT
Start: 2024-11-10 | End: 2024-11-16 | Stop reason: HOSPADM

## 2024-11-09 RX ORDER — MIDODRINE HYDROCHLORIDE 2.5 MG/1
2.5 TABLET ORAL EVERY 8 HOURS PRN
Status: DISCONTINUED | OUTPATIENT
Start: 2024-11-09 | End: 2024-11-16 | Stop reason: HOSPADM

## 2024-11-09 RX ORDER — METOPROLOL TARTRATE 25 MG/1
25 TABLET, FILM COATED ORAL 2 TIMES DAILY
Status: DISCONTINUED | OUTPATIENT
Start: 2024-11-09 | End: 2024-11-16 | Stop reason: HOSPADM

## 2024-11-09 RX ADMIN — IPRATROPIUM BROMIDE 0.5 MG: 0.5 SOLUTION RESPIRATORY (INHALATION) at 19:35

## 2024-11-09 RX ADMIN — OXYCODONE HYDROCHLORIDE AND ACETAMINOPHEN 1 TABLET: 10; 325 TABLET ORAL at 18:13

## 2024-11-09 RX ADMIN — SODIUM CHLORIDE, PRESERVATIVE FREE 10 ML: 5 INJECTION INTRAVENOUS at 20:30

## 2024-11-09 RX ADMIN — IPRATROPIUM BROMIDE 0.5 MG: 0.5 SOLUTION RESPIRATORY (INHALATION) at 07:19

## 2024-11-09 RX ADMIN — BUDESONIDE 1 MG: 1 SUSPENSION RESPIRATORY (INHALATION) at 19:35

## 2024-11-09 RX ADMIN — ARFORMOTEROL TARTRATE 15 MCG: 15 SOLUTION RESPIRATORY (INHALATION) at 07:18

## 2024-11-09 RX ADMIN — BUDESONIDE 1 MG: 1 SUSPENSION RESPIRATORY (INHALATION) at 07:18

## 2024-11-09 RX ADMIN — ATORVASTATIN CALCIUM 10 MG: 10 TABLET, FILM COATED ORAL at 20:30

## 2024-11-09 RX ADMIN — ARFORMOTEROL TARTRATE 15 MCG: 15 SOLUTION RESPIRATORY (INHALATION) at 19:35

## 2024-11-09 RX ADMIN — THERA TABS 1 TABLET: TAB at 08:24

## 2024-11-09 RX ADMIN — SODIUM CHLORIDE, PRESERVATIVE FREE 10 ML: 5 INJECTION INTRAVENOUS at 08:26

## 2024-11-09 RX ADMIN — METOPROLOL TARTRATE 12.5 MG: 25 TABLET, FILM COATED ORAL at 08:24

## 2024-11-09 RX ADMIN — ENOXAPARIN SODIUM 40 MG: 100 INJECTION SUBCUTANEOUS at 08:24

## 2024-11-09 RX ADMIN — METOPROLOL TARTRATE 25 MG: 25 TABLET, FILM COATED ORAL at 20:30

## 2024-11-09 ASSESSMENT — PAIN SCALES - GENERAL
PAINLEVEL_OUTOF10: 0
PAINLEVEL_OUTOF10: 7
PAINLEVEL_OUTOF10: 0
PAINLEVEL_OUTOF10: 0

## 2024-11-09 ASSESSMENT — PAIN DESCRIPTION - ONSET: ONSET: ON-GOING

## 2024-11-09 ASSESSMENT — PAIN DESCRIPTION - FREQUENCY: FREQUENCY: CONTINUOUS

## 2024-11-09 ASSESSMENT — PAIN - FUNCTIONAL ASSESSMENT: PAIN_FUNCTIONAL_ASSESSMENT: ACTIVITIES ARE NOT PREVENTED

## 2024-11-09 ASSESSMENT — PAIN DESCRIPTION - ORIENTATION: ORIENTATION: LEFT

## 2024-11-09 ASSESSMENT — PAIN DESCRIPTION - LOCATION: LOCATION: RIB CAGE

## 2024-11-09 ASSESSMENT — PAIN DESCRIPTION - PAIN TYPE: TYPE: CHRONIC PAIN

## 2024-11-09 ASSESSMENT — PAIN DESCRIPTION - DESCRIPTORS: DESCRIPTORS: ACHING

## 2024-11-09 NOTE — PROGRESS NOTES
[facility] Hospitalist Group  Progress Note  Date:2024       Room:[facility]  Patient Name:Delfin Shetty     YOB: 1955     Age:68 y.o.        Subjective      : Patient was seen in presence of his friend Ms. Roth.  Patient is feeling okay.  He is able to drink Ensure.  Generalized weakness present.  Some abdominal discomfort present but no chest pain or shortness of breath.  Off-and-on cough present.  No nausea or vomiting currently.    Disposition: Medically stable for discharge. Has acceptance at LT. Pending Medicaid.    Objective         Vitals Last 24 Hours:  TEMPERATURE:  Temp  Av.2 °F (37.3 °C)  Min: 98.2 °F (36.8 °C)  Max: 100.6 °F (38.1 °C)  RESPIRATIONS RANGE: Resp  Av.3  Min: 16  Max: 20  PULSE OXIMETRY RANGE: SpO2  Av.4 %  Min: 91 %  Max: 95 %  PULSE RANGE: Pulse  Av.5  Min: 113  Max: 131  BLOOD PRESSURE RANGE: Systolic (24hrs), Av , Min:109 , Max:136     ; Diastolic (24hrs), Av, Min:56, Max:65      I/O (24Hr):    Intake/Output Summary (Last 24 hours) at 2024 1330  Last data filed at 2024 0500  Gross per 24 hour   Intake 480 ml   Output 650 ml   Net -170 ml       /63   Pulse (!) 114   Temp 98.2 °F (36.8 °C) (Oral)   Resp 18   Ht 1.702 m (5' 7.01\")   Wt 56 kg (123 lb 7.3 oz)   SpO2 95%   BMI 19.33 kg/m²       General appearance - alert, ill appearing, cachectic, and in no distress  Neck - supple, no JVD, trachea is midline  Chest -diminished air entry noted in bases, no wheezes  Heart - S1 and S2 normal, tachycardia present  Abdomen - soft, nontender, nondistended, Bowel sounds present  Neurological -awake, follows over commands appropriately, moves all extremities, not in acute distress  Musculoskeletal - no joint tenderness or swelling of knees bilaterally  Extremities - no pedal edema noted      Labs/Imaging/Diagnostics    Labs:  CBC:  No results for input(s): \"WBC\", \"RBC\", \"HGB\", \"HCT\", \"MCV\", \"RDW\",  mistakes, some may have been missed, and remained in the body of the document. If questions arise, please contact our department.

## 2024-11-09 NOTE — PLAN OF CARE
Problem: Pain  Goal: Verbalizes/displays adequate comfort level or baseline comfort level  Description: Monitoring pain level and giving pain medication as needed  Outcome: Progressing     Problem: Safety - Adult  Goal: Free from fall injury  Description: Gripper socks, call bell in reach  Outcome: Progressing  Note: Safety measures in place per protocol. Call bell within reach, bed in lowest position, and bed alarm in place.     Problem: Respiratory - Adult  Goal: Achieves optimal ventilation and oxygenation  Outcome: Progressing  Flowsheets (Taken 11/8/2024 2030)  Achieves optimal ventilation and oxygenation: Assess for changes in respiratory status     Problem: Skin/Tissue Integrity - Adult  Goal: Incisions, wounds, or drain sites healing without S/S of infection  Outcome: Progressing  Note: Puncture site to left chest area s/p discontinuation of chest tube. Dressing C/D/I. Will assess for s/s of infection. Will monitor temperature and lab results.     Problem: Infection - Adult  Goal: Absence of infection during hospitalization  Outcome: Progressing  Flowsheets (Taken 11/8/2024 2030)  Absence of infection during hospitalization:   Monitor lab/diagnostic results   Assess and monitor for signs and symptoms of infection   Instruct and encourage patient and family to use good hand hygiene technique     Problem: Skin/Tissue Integrity - Adult  Goal: Oral mucous membranes remain intact  Outcome: Completed     Problem: Musculoskeletal - Adult  Goal: Return mobility to safest level of function  Outcome: Completed     Problem: Musculoskeletal - Adult  Goal: Maintain proper alignment of affected body part  Outcome: Completed     Problem: Musculoskeletal - Adult  Goal: Return ADL status to a safe level of function  Outcome: Completed     Problem: Gastrointestinal - Adult  Goal: Maintains or returns to baseline bowel function  Outcome: Completed     Problem: Gastrointestinal - Adult  Goal: Maintains adequate nutritional

## 2024-11-09 NOTE — PLAN OF CARE
Problem: Discharge Planning  Goal: Discharge to home or other facility with appropriate resources  Outcome: Progressing  Flowsheets  Taken 11/9/2024 1415  Discharge to home or other facility with appropriate resources:   Identify barriers to discharge with patient and caregiver   Arrange for needed discharge resources and transportation as appropriate  Taken 11/9/2024 0815  Discharge to home or other facility with appropriate resources: Identify barriers to discharge with patient and caregiver     Problem: Pain  Goal: Verbalizes/displays adequate comfort level or baseline comfort level  Description: Monitoring pain level and giving pain medication as needed  11/9/2024 1415 by Niesha Levin RN  Outcome: Completed  11/9/2024 0223 by Edith Cruz RN  Outcome: Progressing     Problem: Safety - Adult  Goal: Free from fall injury  Description: Gripper socks, call bell in reach  11/9/2024 1415 by Niesha Levin RN  Outcome: Progressing  Flowsheets (Taken 11/9/2024 1415)  Free From Fall Injury: Based on caregiver fall risk screen, instruct family/caregiver to ask for assistance with transferring infant if caregiver noted to have fall risk factors  11/9/2024 0223 by Edith Cruz RN  Outcome: Progressing  Note: Safety measures in place per protocol. Call bell within reach, bed in lowest position, and bed alarm in place.     Problem: Skin/Tissue Integrity  Goal: Absence of new skin breakdown  Description: 1.  Monitor for areas of redness and/or skin breakdown  2.  Assess vascular access sites hourly  3.  Every 4-6 hours minimum:  Change oxygen saturation probe site  4.  Every 4-6 hours:  If on nasal continuous positive airway pressure, respiratory therapy assess nares and determine need for appliance change or resting period.  Outcome: Progressing     Problem: Respiratory - Adult  Goal: Achieves optimal ventilation and oxygenation  11/9/2024 1415 by Niesha Levin, RN  Outcome: Progressing  Flowsheets  Taken  intake  Recent Flowsheet Documentation  Taken 11/9/2024 0815 by Niesha Levin RN  Maintains adequate nutritional intake: Monitor percentage of each meal consumed  11/9/2024 0223 by Edith Cruz RN  Outcome: Completed     Problem: Infection - Adult  Goal: Absence of infection at discharge  Recent Flowsheet Documentation  Taken 11/9/2024 0815 by Niesha Levin RN  Absence of infection at discharge: Assess and monitor for signs and symptoms of infection  11/9/2024 0223 by Edith Cruz RN  Outcome: Completed     Problem: Infection - Adult  Goal: Absence of infection during hospitalization  11/9/2024 1415 by Niesha Levin RN  Outcome: Completed  Flowsheets (Taken 11/9/2024 0815)  Absence of infection during hospitalization: Assess and monitor for signs and symptoms of infection  11/9/2024 0223 by Edith Cruz RN  Outcome: Progressing  Flowsheets (Taken 11/8/2024 2030)  Absence of infection during hospitalization:   Monitor lab/diagnostic results   Assess and monitor for signs and symptoms of infection   Instruct and encourage patient and family to use good hand hygiene technique     Problem: Infection - Adult  Goal: Absence of fever/infection during anticipated neutropenic period  Recent Flowsheet Documentation  Taken 11/9/2024 0815 by Niesha Levin RN  Absence of fever/infection during anticipated neutropenic period: Monitor white blood cell count  11/9/2024 0223 by Edith Cruz RN  Outcome: Completed     Problem: Metabolic/Fluid and Electrolytes - Adult  Goal: Electrolytes maintained within normal limits  Recent Flowsheet Documentation  Taken 11/9/2024 0815 by Niesha Levin RN  Electrolytes maintained within normal limits: Monitor labs and assess patient for signs and symptoms of electrolyte imbalances  11/9/2024 0223 by Edith Cruz RN  Outcome: Completed     Problem: Metabolic/Fluid and Electrolytes - Adult  Goal: Hemodynamic stability and optimal renal function

## 2024-11-09 NOTE — PROGRESS NOTES
4 Eyes Skin Assessment     NAME:  Delfin Shetty  YOB: 1955  MEDICAL RECORD NUMBER:  [id number]    The patient is being assessed for  Transfer to New Unit    I agree that at least one RN has performed a thorough Head to Toe Skin Assessment on the patient. ALL assessment sites listed below have been assessed.      Areas assessed by both nurses:    Head, Face, Ears, Shoulders, Back, Chest, Arms, Elbows, Hands, Sacrum. Buttock, Coccyx, Ischium, Legs. Feet and Heels, and Under Medical Devices         Does the Patient have a Wound? No noted wound(s)       Valentin Prevention initiated by RN: No  Wound Care Orders initiated by RN: No    Pressure Injury (Stage 3,4, Unstageable, DTI, NWPT, and Complex wounds) if present, place Wound referral order by RN under : No    New Ostomies, if present place, Ostomy referral order under : No     Nurse 1 eSignature: Electronically signed by Bre Gage RN on 11/8/24 at 7:43 PM EST    **SHARE this note so that the co-signing nurse can place an eSignature**    Nurse 2 eSignature: {Esignature:[id number]}

## 2024-11-09 NOTE — PROGRESS NOTES
4 Eyes Skin Assessment     NAME:  Delfin Shetty  YOB: 1955  MEDICAL RECORD NUMBER:  [id number]    The patient is being assessed for  Shift Handoff    I agree that at least one RN has performed a thorough Head to Toe Skin Assessment on the patient. ALL assessment sites listed below have been assessed.      Areas assessed by both nurses:    Head, Face, Ears, Shoulders, Back, Chest, Arms, Elbows, Hands, Sacrum. Buttock, Coccyx, Ischium, Legs. Feet and Heels, and Under Medical Devices         Does the Patient have a Wound? No noted wound(s)       Valentin Prevention initiated by RN: Yes  Wound Care Orders initiated by RN: No    Pressure Injury (Stage 3,4, Unstageable, DTI, NWPT, and Complex wounds) if present, place Wound referral order by RN under : No    New Ostomies, if present place, Ostomy referral order under : No     Nurse 1 eSignature: Electronically signed by Edith Cruz RN on 11/9/24 at 8:07 AM EST    **SHARE this note so that the co-signing nurse can place an eSignature**    Nurse 2 eSignature: {Esignature:[id number]}

## 2024-11-10 LAB
ALBUMIN SERPL-MCNC: 1.5 G/DL (ref 3.4–5)
ALBUMIN/GLOB SERPL: 0.3 (ref 0.8–1.7)
ALP SERPL-CCNC: 107 U/L (ref 45–117)
ALT SERPL-CCNC: 31 U/L (ref 16–61)
ANION GAP SERPL CALC-SCNC: 8 MMOL/L (ref 3–18)
AST SERPL-CCNC: 23 U/L (ref 10–38)
BASOPHILS # BLD: 0.4 K/UL (ref 0–0.1)
BASOPHILS NFR BLD: 2 % (ref 0–2)
BILIRUB SERPL-MCNC: 0.4 MG/DL (ref 0.2–1)
BUN SERPL-MCNC: 13 MG/DL (ref 7–18)
BUN/CREAT SERPL: 21 (ref 12–20)
CALCIUM SERPL-MCNC: 9.4 MG/DL (ref 8.5–10.1)
CHLORIDE SERPL-SCNC: 107 MMOL/L (ref 100–111)
CO2 SERPL-SCNC: 23 MMOL/L (ref 21–32)
CREAT SERPL-MCNC: 0.61 MG/DL (ref 0.6–1.3)
DIFFERENTIAL METHOD BLD: ABNORMAL
EOSINOPHIL # BLD: 3 K/UL (ref 0–0.4)
EOSINOPHIL NFR BLD: 17 % (ref 0–5)
ERYTHROCYTE [DISTWIDTH] IN BLOOD BY AUTOMATED COUNT: 13.2 % (ref 11.6–14.5)
GLOBULIN SER CALC-MCNC: 4.7 G/DL (ref 2–4)
GLUCOSE SERPL-MCNC: 141 MG/DL (ref 74–99)
HCT VFR BLD AUTO: 26.9 % (ref 36–48)
HGB BLD-MCNC: 8.4 G/DL (ref 13–16)
IMM GRANULOCYTES # BLD AUTO: 0 K/UL (ref 0–0.04)
IMM GRANULOCYTES NFR BLD AUTO: 0 % (ref 0–0.5)
LYMPHOCYTES # BLD: 2.5 K/UL (ref 0.9–3.6)
LYMPHOCYTES NFR BLD: 14 % (ref 21–52)
MAGNESIUM SERPL-MCNC: 2.2 MG/DL (ref 1.6–2.6)
MCH RBC QN AUTO: 28.6 PG (ref 24–34)
MCHC RBC AUTO-ENTMCNC: 31.2 G/DL (ref 31–37)
MCV RBC AUTO: 91.5 FL (ref 78–100)
MONOCYTES # BLD: 1.9 K/UL (ref 0.05–1.2)
MONOCYTES NFR BLD: 11 % (ref 3–10)
NEUTS SEG # BLD: 9.8 K/UL (ref 1.8–8)
NEUTS SEG NFR BLD: 56 % (ref 40–73)
NRBC # BLD: 0 K/UL (ref 0–0.01)
NRBC BLD-RTO: 0 PER 100 WBC
PLATELET # BLD AUTO: 518 K/UL (ref 135–420)
PLATELET COMMENT: ABNORMAL
PMV BLD AUTO: 8.8 FL (ref 9.2–11.8)
POTASSIUM SERPL-SCNC: 3.8 MMOL/L (ref 3.5–5.5)
PROT SERPL-MCNC: 6.2 G/DL (ref 6.4–8.2)
RBC # BLD AUTO: 2.94 M/UL (ref 4.35–5.65)
RBC MORPH BLD: ABNORMAL
SODIUM SERPL-SCNC: 138 MMOL/L (ref 136–145)
WBC # BLD AUTO: 17.6 K/UL (ref 4.6–13.2)

## 2024-11-10 PROCEDURE — 6360000002 HC RX W HCPCS: Performed by: STUDENT IN AN ORGANIZED HEALTH CARE EDUCATION/TRAINING PROGRAM

## 2024-11-10 PROCEDURE — 80053 COMPREHEN METABOLIC PANEL: CPT

## 2024-11-10 PROCEDURE — 2580000003 HC RX 258: Performed by: PHYSICIAN ASSISTANT

## 2024-11-10 PROCEDURE — 6370000000 HC RX 637 (ALT 250 FOR IP): Performed by: PHYSICIAN ASSISTANT

## 2024-11-10 PROCEDURE — 94668 MNPJ CHEST WALL SBSQ: CPT

## 2024-11-10 PROCEDURE — 83735 ASSAY OF MAGNESIUM: CPT

## 2024-11-10 PROCEDURE — 85025 COMPLETE CBC W/AUTO DIFF WBC: CPT

## 2024-11-10 PROCEDURE — 2700000000 HC OXYGEN THERAPY PER DAY

## 2024-11-10 PROCEDURE — 6360000002 HC RX W HCPCS: Performed by: INTERNAL MEDICINE

## 2024-11-10 PROCEDURE — 94640 AIRWAY INHALATION TREATMENT: CPT

## 2024-11-10 PROCEDURE — 6370000000 HC RX 637 (ALT 250 FOR IP): Performed by: INTERNAL MEDICINE

## 2024-11-10 PROCEDURE — 97168 OT RE-EVAL EST PLAN CARE: CPT

## 2024-11-10 PROCEDURE — 6360000002 HC RX W HCPCS: Performed by: PHYSICIAN ASSISTANT

## 2024-11-10 PROCEDURE — 94761 N-INVAS EAR/PLS OXIMETRY MLT: CPT

## 2024-11-10 PROCEDURE — 6370000000 HC RX 637 (ALT 250 FOR IP): Performed by: STUDENT IN AN ORGANIZED HEALTH CARE EDUCATION/TRAINING PROGRAM

## 2024-11-10 PROCEDURE — 36415 COLL VENOUS BLD VENIPUNCTURE: CPT

## 2024-11-10 PROCEDURE — 1100000000 HC RM PRIVATE

## 2024-11-10 PROCEDURE — 2580000003 HC RX 258: Performed by: INTERNAL MEDICINE

## 2024-11-10 PROCEDURE — 99233 SBSQ HOSP IP/OBS HIGH 50: CPT | Performed by: INTERNAL MEDICINE

## 2024-11-10 RX ORDER — LIDOCAINE 4 G/G
1 PATCH TOPICAL DAILY
Status: DISCONTINUED | OUTPATIENT
Start: 2024-11-10 | End: 2024-11-16 | Stop reason: HOSPADM

## 2024-11-10 RX ADMIN — PANTOPRAZOLE SODIUM 40 MG: 40 TABLET, DELAYED RELEASE ORAL at 06:04

## 2024-11-10 RX ADMIN — OXYCODONE HYDROCHLORIDE AND ACETAMINOPHEN 1 TABLET: 10; 325 TABLET ORAL at 20:09

## 2024-11-10 RX ADMIN — METOPROLOL TARTRATE 25 MG: 25 TABLET, FILM COATED ORAL at 20:09

## 2024-11-10 RX ADMIN — ARFORMOTEROL TARTRATE 15 MCG: 15 SOLUTION RESPIRATORY (INHALATION) at 07:55

## 2024-11-10 RX ADMIN — ARFORMOTEROL TARTRATE 15 MCG: 15 SOLUTION RESPIRATORY (INHALATION) at 19:45

## 2024-11-10 RX ADMIN — IPRATROPIUM BROMIDE 0.5 MG: 0.5 SOLUTION RESPIRATORY (INHALATION) at 19:46

## 2024-11-10 RX ADMIN — PIPERACILLIN AND TAZOBACTAM 3375 MG: 3; .375 INJECTION, POWDER, FOR SOLUTION INTRAVENOUS at 19:21

## 2024-11-10 RX ADMIN — BUDESONIDE 1 MG: 1 SUSPENSION RESPIRATORY (INHALATION) at 07:55

## 2024-11-10 RX ADMIN — BUDESONIDE 1 MG: 1 SUSPENSION RESPIRATORY (INHALATION) at 19:46

## 2024-11-10 RX ADMIN — ATORVASTATIN CALCIUM 10 MG: 10 TABLET, FILM COATED ORAL at 20:09

## 2024-11-10 RX ADMIN — ENOXAPARIN SODIUM 40 MG: 100 INJECTION SUBCUTANEOUS at 08:09

## 2024-11-10 RX ADMIN — SODIUM CHLORIDE, PRESERVATIVE FREE 10 ML: 5 INJECTION INTRAVENOUS at 08:09

## 2024-11-10 RX ADMIN — IPRATROPIUM BROMIDE 0.5 MG: 0.5 SOLUTION RESPIRATORY (INHALATION) at 07:55

## 2024-11-10 RX ADMIN — SODIUM CHLORIDE, PRESERVATIVE FREE 10 ML: 5 INJECTION INTRAVENOUS at 20:10

## 2024-11-10 RX ADMIN — THERA TABS 1 TABLET: TAB at 08:08

## 2024-11-10 RX ADMIN — METOPROLOL TARTRATE 25 MG: 25 TABLET, FILM COATED ORAL at 08:09

## 2024-11-10 RX ADMIN — PIPERACILLIN AND TAZOBACTAM 4500 MG: 4; .5 INJECTION, POWDER, FOR SOLUTION INTRAVENOUS at 13:00

## 2024-11-10 ASSESSMENT — PAIN DESCRIPTION - LOCATION: LOCATION: RIB CAGE

## 2024-11-10 ASSESSMENT — PAIN DESCRIPTION - PAIN TYPE: TYPE: ACUTE PAIN

## 2024-11-10 ASSESSMENT — PAIN DESCRIPTION - ORIENTATION: ORIENTATION: LEFT

## 2024-11-10 ASSESSMENT — PAIN DESCRIPTION - DESCRIPTORS: DESCRIPTORS: SHARP

## 2024-11-10 ASSESSMENT — PAIN SCALES - GENERAL
PAINLEVEL_OUTOF10: 0
PAINLEVEL_OUTOF10: 7
PAINLEVEL_OUTOF10: 0

## 2024-11-10 ASSESSMENT — PAIN DESCRIPTION - FREQUENCY: FREQUENCY: INTERMITTENT

## 2024-11-10 ASSESSMENT — PAIN DESCRIPTION - ONSET: ONSET: ON-GOING

## 2024-11-10 ASSESSMENT — PAIN - FUNCTIONAL ASSESSMENT: PAIN_FUNCTIONAL_ASSESSMENT: ACTIVITIES ARE NOT PREVENTED

## 2024-11-10 NOTE — PROGRESS NOTES
Date: 10/29/2024  No change of the left base tube thoracostomy. No pneumothorax. Persistent patchy opacity and peripheral patchy opacities are consistent with consolidation/atelectasis. Again noted is somewhat masslike opacity in the lateral apex. The right lung is clear. Electronically signed by Kaiser Kevin    XR CHEST PORTABLE    Result Date: 10/28/2024  Left base tube thoracostomy. No pneumothorax. Persistent peripheral patchy consolidation in the left lung, apex to mid lung with left base opacification consistent with atelectasis/infiltrate and possible residual effusion, not significantly changed. Again noted is the somewhat masslike consolidation in the lateral left apex noted on prior CTA, not significantly changed. Electronically signed by Kaiser Kevin      Current Medications:  Current Facility-Administered Medications: piperacillin-tazobactam (ZOSYN) 3,375 mg in sodium chloride 0.9 % 50 mL IVPB (mini-bag), 3,375 mg, IntraVENous, Q6H  midodrine (PROAMATINE) tablet 2.5 mg, 2.5 mg, Oral, Q8H PRN  metoprolol tartrate (LOPRESSOR) tablet 25 mg, 25 mg, Oral, BID  aluminum-magnesium hydroxide 200-200 MG/5ML suspension 20 mL, 20 mL, Oral, Q6H PRN  pantoprazole (PROTONIX) tablet 40 mg, 40 mg, Oral, QAM AC  ipratropium (ATROVENT) 0.02 % nebulizer solution 0.5 mg, 0.5 mg, Nebulization, BID  oxyCODONE-acetaminophen (PERCOCET)  MG per tablet 1 tablet, 1 tablet, Oral, Q4H PRN  diatrizoate meglumine-sodium (GASTROGRAFIN) 66-10 % solution 30 mL, 30 mL, Oral, ONCE PRN  multivitamin 1 tablet, 1 tablet, Oral, Daily  sodium chloride flush 0.9 % injection 5-40 mL, 5-40 mL, IntraVENous, 2 times per day  sodium chloride flush 0.9 % injection 5-40 mL, 5-40 mL, IntraVENous, PRN  0.9 % sodium chloride infusion, , IntraVENous, PRN  magnesium sulfate 2000 mg in 50 mL IVPB premix, 2,000 mg, IntraVENous, PRN  enoxaparin (LOVENOX) injection 40 mg, 40 mg, SubCUTAneous, Daily  ondansetron (ZOFRAN-ODT) disintegrating tablet 4 mg, 4 mg,  Oral, Q8H PRN **OR** ondansetron (ZOFRAN) injection 4 mg, 4 mg, IntraVENous, Q6H PRN  polyethylene glycol (GLYCOLAX) packet 17 g, 17 g, Oral, Daily PRN  bisacodyl (DULCOLAX) suppository 10 mg, 10 mg, Rectal, Daily PRN  acetaminophen (TYLENOL) tablet 650 mg, 650 mg, Oral, Q6H PRN **OR** acetaminophen (TYLENOL) suppository 650 mg, 650 mg, Rectal, Q6H PRN  atorvastatin (LIPITOR) tablet 10 mg, 10 mg, Oral, Nightly  budesonide (PULMICORT) nebulizer suspension 1 mg, 1 mg, Nebulization, BID  arformoterol tartrate (BROVANA) nebulizer solution 15 mcg, 15 mcg, Nebulization, BID RT      Assessment//Plan           Hospital Problems             Last Modified POA    * (Principal) Severe sepsis (HCC) 10/24/2024 Yes    Primary hypertension 10/23/2024 Yes    HLD (hyperlipidemia) 10/23/2024 Yes    Acute respiratory failure with hypoxia 10/24/2024 Yes    Hypokalemia 10/23/2024 Yes    Eosinophilia 10/23/2024 Yes    Thrombocytosis 10/23/2024 Yes    Bullous emphysema (HCC) 10/24/2024 Yes    Empyema lung (HCC) 10/24/2024 Yes    Large pleural effusion 10/24/2024 Yes    Pneumonia of left lung due to infectious organism 10/24/2024 Yes    Acute respiratory failure with hypoxemia 10/25/2024 Yes    Sepsis due to pneumonia (HCC) 10/26/2024 Yes    Severe protein-calorie malnutrition (HCC) 10/28/2024 Yes    Carcinoma of left lung (HCC) 10/29/2024 Yes    Malignant pleural effusion 11/1/2024 Yes     ASSESSMENT:    #Large malignant pleural effusion.  Status post CT-guided chest tube thoracostomy, lytics. Pleural effusion studies are exudative, lymphocytic and eosinophilic consistent with malignant effusion. Metasesis to left hilar node. MRI brain neg for mets  #Non-small cell Squamous cell carcinoma, poorly differentiated  #Eosinophilia  #Severe atelectasis secondary to pleural effusion  #Developing tension hydrothorax  #Acute hypoxic respiratory failure. On 2L NC  #Possible lung mass, concern mesothelioma  #Suspected COPD with bullous

## 2024-11-10 NOTE — PROGRESS NOTES
Pharmacy Note     Piperacillin/Tazobactam 3.375 grams q6h ordered for treatment of CAP. Per [facility] Policy, Piperacillin/Tazobactam will be changed to 4.5 grams Once followed by 3.375 grams q8h infused over 240 minutes.     Estimated Creatinine Clearance: Estimated Creatinine Clearance: 91 mL/min (based on SCr of 0.61 mg/dL).  Dialysis Status, SANDRITA, CKD: -    BMI:  Body mass index is 19.33 kg/m².    Rationale for Adjustment:  [facility] B-Lactam extended infusion policy    Pharmacy will continue to monitor and adjust dose as necessary.      Please call with any questions.    Thank you,  Ana Beard, [facility]

## 2024-11-10 NOTE — PLAN OF CARE
Problem: Discharge Planning  Goal: Discharge to home or other facility with appropriate resources  11/10/2024 0149 by Gretchen Powell RN  Outcome: Progressing  Note: Patient will be discharged to appropriate facility with appropriate resources upon discharge.     Problem: Safety - Adult  Goal: Free from fall injury  Description: Gripper socks, call bell in reach  11/10/2024 0149 by Gretchen Powell RN  Outcome: Progressing  Note: Patient will be free from falls/ injuries during this admission.     Problem: Skin/Tissue Integrity  Goal: Absence of new skin breakdown  Description: 1.  Monitor for areas of redness and/or skin breakdown  2.  Assess vascular access sites hourly  3.  Every 4-6 hours minimum:  Change oxygen saturation probe site  4.  Every 4-6 hours:  If on nasal continuous positive airway pressure, respiratory therapy assess nares and determine need for appliance change or resting period.  11/10/2024 0149 by Gretchen Powell RN  Outcome: Progressing  Note: Patient will be free from any new skin breakdown during this admission.     Problem: Respiratory - Adult  Goal: Achieves optimal ventilation and oxygenation  11/10/2024 0149 by Gretchen Powell RN  Outcome: Progressing  Note: Patient's respiratory status will be assessed and maintained WDL every shift .     Problem: Chronic Conditions and Co-morbidities  Goal: Patient's chronic conditions and co-morbidity symptoms are monitored and maintained or improved  11/10/2024 0149 by Gretchen Powell RN  Note: Patient's chronic conditions and comorbidity will be monitored and maintained or improve during this admission.

## 2024-11-10 NOTE — PLAN OF CARE
attached to the oxygen from the wall, reconnected his tube to the oxygen. SBA for functional mobility within room with noted furniture surfing and noted SOB. Required rest breaks and verbal cues for safety, speed and PLB. Pt left lying comfortably in bed with all needs met and call bell within reach.     DEFICITS/IMPAIRMENTS:  Performance deficits / Impairments: Decreased functional mobility ;Decreased endurance;Decreased coordination;Decreased ADL status;Decreased balance    Patient will benefit from skilled intervention to address the above impairments.  Patient's rehabilitation potential/Prognosis: Fair.  Factors which may influence rehabilitation potential include:   [x]             None noted  []             Mental ability/status  []             Medical condition  []             Home/family situation and support systems  []             Safety awareness  []             Pain tolerance/management  []             Other:      PLAN :  Recommendations and Planned Interventions:   [x]               Self Care Training                  [x]      Therapeutic Activities  [x]               Functional Mobility Training   []      Cognitive Retraining  [x]               Therapeutic Exercises           [x]      Endurance Activities  [x]               Balance Training                    []      Neuromuscular Re-Education  []               Visual/Perceptual Training     [x]      Home Safety Training  [x]               Patient Education                   [x]      Family Training/Education  []               Other (comment):    Frequency/Duration: Patient will be followed by occupational therapy to address goals, 1-2 times per day/3-5 days per week to address goals.    Further Equipment Recommendations for Discharge: shower chair and rolling walker    [facility]: AM-PAC Inpatient Daily Activity Raw Score: 17    Current research shows that an AM-PAC score of 17 or less is not associated with a discharge to the patient's home  understanding    Thank you for this referral.  Griselda Pitts, EVERT  Minutes: 8

## 2024-11-10 NOTE — PROGRESS NOTES
4 Eyes Skin Assessment     NAME:  Delfin Shetyt  YOB: 1955  MEDICAL RECORD NUMBER:  [id number]    The patient is being assessed for  Shift Handoff    I agree that at least one RN has performed a thorough Head to Toe Skin Assessment on the patient. ALL assessment sites listed below have been assessed.      Areas assessed by both nurses:    Head, Face, Ears, Shoulders, Back, Chest, Arms, Elbows, Hands, Sacrum. Buttock, Coccyx, Ischium, Legs. Feet and Heels, and Under Medical Devices         Does the Patient have a Wound? No noted wound(s)       Valentin Prevention initiated by RN: No  Wound Care Orders initiated by RN: No    Pressure Injury (Stage 3,4, Unstageable, DTI, NWPT, and Complex wounds) if present, place Wound referral order by RN under : No    New Ostomies, if present place, Ostomy referral order under : No     Nurse 1 eSignature: Electronically signed by Gretchen Powell RN on 11/10/24 at 8:14 AM EST    **SHARE this note so that the co-signing nurse can place an eSignature**    Nurse 2 eSignature: {Esignature:[id number]}

## 2024-11-11 LAB
BACTERIA SPEC CULT: NORMAL
BASOPHILS # BLD: 0.2 K/UL (ref 0–0.1)
BASOPHILS NFR BLD: 1 % (ref 0–2)
DIFFERENTIAL METHOD BLD: ABNORMAL
EOSINOPHIL # BLD: 3.1 K/UL (ref 0–0.4)
EOSINOPHIL NFR BLD: 17 % (ref 0–5)
ERYTHROCYTE [DISTWIDTH] IN BLOOD BY AUTOMATED COUNT: 13.3 % (ref 11.6–14.5)
HCT VFR BLD AUTO: 26.3 % (ref 36–48)
HGB BLD-MCNC: 8.1 G/DL (ref 13–16)
IMM GRANULOCYTES # BLD AUTO: 0.1 K/UL (ref 0–0.04)
IMM GRANULOCYTES NFR BLD AUTO: 1 % (ref 0–0.5)
LYMPHOCYTES # BLD: 2 K/UL (ref 0.9–3.6)
LYMPHOCYTES NFR BLD: 11 % (ref 21–52)
MCH RBC QN AUTO: 28.2 PG (ref 24–34)
MCHC RBC AUTO-ENTMCNC: 30.8 G/DL (ref 31–37)
MCV RBC AUTO: 91.6 FL (ref 78–100)
MONOCYTES # BLD: 2.2 K/UL (ref 0.05–1.2)
MONOCYTES NFR BLD: 12 % (ref 3–10)
NEUTS SEG # BLD: 11 K/UL (ref 1.8–8)
NEUTS SEG NFR BLD: 59 % (ref 40–73)
NRBC # BLD: 0 K/UL (ref 0–0.01)
NRBC BLD-RTO: 0 PER 100 WBC
PLATELET # BLD AUTO: 525 K/UL (ref 135–420)
PMV BLD AUTO: 8.9 FL (ref 9.2–11.8)
RBC # BLD AUTO: 2.87 M/UL (ref 4.35–5.65)
SERVICE CMNT-IMP: NORMAL
WBC # BLD AUTO: 18.6 K/UL (ref 4.6–13.2)

## 2024-11-11 PROCEDURE — 1100000000 HC RM PRIVATE

## 2024-11-11 PROCEDURE — 2580000003 HC RX 258: Performed by: PHYSICIAN ASSISTANT

## 2024-11-11 PROCEDURE — 6370000000 HC RX 637 (ALT 250 FOR IP): Performed by: STUDENT IN AN ORGANIZED HEALTH CARE EDUCATION/TRAINING PROGRAM

## 2024-11-11 PROCEDURE — 6360000002 HC RX W HCPCS: Performed by: STUDENT IN AN ORGANIZED HEALTH CARE EDUCATION/TRAINING PROGRAM

## 2024-11-11 PROCEDURE — 6370000000 HC RX 637 (ALT 250 FOR IP): Performed by: PHYSICIAN ASSISTANT

## 2024-11-11 PROCEDURE — 94640 AIRWAY INHALATION TREATMENT: CPT

## 2024-11-11 PROCEDURE — 99223 1ST HOSP IP/OBS HIGH 75: CPT

## 2024-11-11 PROCEDURE — 6370000000 HC RX 637 (ALT 250 FOR IP): Performed by: INTERNAL MEDICINE

## 2024-11-11 PROCEDURE — 94761 N-INVAS EAR/PLS OXIMETRY MLT: CPT

## 2024-11-11 PROCEDURE — 99232 SBSQ HOSP IP/OBS MODERATE 35: CPT | Performed by: STUDENT IN AN ORGANIZED HEALTH CARE EDUCATION/TRAINING PROGRAM

## 2024-11-11 PROCEDURE — 2580000003 HC RX 258: Performed by: INTERNAL MEDICINE

## 2024-11-11 PROCEDURE — 6360000002 HC RX W HCPCS: Performed by: INTERNAL MEDICINE

## 2024-11-11 PROCEDURE — 2700000000 HC OXYGEN THERAPY PER DAY

## 2024-11-11 PROCEDURE — 6360000002 HC RX W HCPCS: Performed by: PHYSICIAN ASSISTANT

## 2024-11-11 PROCEDURE — 85025 COMPLETE CBC W/AUTO DIFF WBC: CPT

## 2024-11-11 PROCEDURE — 36415 COLL VENOUS BLD VENIPUNCTURE: CPT

## 2024-11-11 RX ADMIN — METOPROLOL TARTRATE 25 MG: 25 TABLET, FILM COATED ORAL at 11:08

## 2024-11-11 RX ADMIN — PIPERACILLIN AND TAZOBACTAM 3375 MG: 3; .375 INJECTION, POWDER, FOR SOLUTION INTRAVENOUS at 19:41

## 2024-11-11 RX ADMIN — ATORVASTATIN CALCIUM 10 MG: 10 TABLET, FILM COATED ORAL at 22:04

## 2024-11-11 RX ADMIN — PIPERACILLIN AND TAZOBACTAM 3375 MG: 3; .375 INJECTION, POWDER, FOR SOLUTION INTRAVENOUS at 03:59

## 2024-11-11 RX ADMIN — ARFORMOTEROL TARTRATE 15 MCG: 15 SOLUTION RESPIRATORY (INHALATION) at 19:01

## 2024-11-11 RX ADMIN — ACETAMINOPHEN 325MG 650 MG: 325 TABLET ORAL at 04:10

## 2024-11-11 RX ADMIN — IPRATROPIUM BROMIDE 0.5 MG: 0.5 SOLUTION RESPIRATORY (INHALATION) at 19:01

## 2024-11-11 RX ADMIN — BUDESONIDE 1 MG: 1 SUSPENSION RESPIRATORY (INHALATION) at 07:31

## 2024-11-11 RX ADMIN — SODIUM CHLORIDE, PRESERVATIVE FREE 10 ML: 5 INJECTION INTRAVENOUS at 11:16

## 2024-11-11 RX ADMIN — OXYCODONE HYDROCHLORIDE AND ACETAMINOPHEN 1 TABLET: 10; 325 TABLET ORAL at 16:30

## 2024-11-11 RX ADMIN — SODIUM CHLORIDE, PRESERVATIVE FREE 10 ML: 5 INJECTION INTRAVENOUS at 22:04

## 2024-11-11 RX ADMIN — IPRATROPIUM BROMIDE 0.5 MG: 0.5 SOLUTION RESPIRATORY (INHALATION) at 07:31

## 2024-11-11 RX ADMIN — SODIUM CHLORIDE, PRESERVATIVE FREE 10 ML: 5 INJECTION INTRAVENOUS at 11:15

## 2024-11-11 RX ADMIN — BUDESONIDE 1 MG: 1 SUSPENSION RESPIRATORY (INHALATION) at 19:02

## 2024-11-11 RX ADMIN — PIPERACILLIN AND TAZOBACTAM 3375 MG: 3; .375 INJECTION, POWDER, FOR SOLUTION INTRAVENOUS at 11:15

## 2024-11-11 RX ADMIN — THERA TABS 1 TABLET: TAB at 11:08

## 2024-11-11 RX ADMIN — PANTOPRAZOLE SODIUM 40 MG: 40 TABLET, DELAYED RELEASE ORAL at 06:21

## 2024-11-11 RX ADMIN — ENOXAPARIN SODIUM 40 MG: 100 INJECTION SUBCUTANEOUS at 11:08

## 2024-11-11 RX ADMIN — ARFORMOTEROL TARTRATE 15 MCG: 15 SOLUTION RESPIRATORY (INHALATION) at 07:31

## 2024-11-11 ASSESSMENT — PAIN SCALES - GENERAL
PAINLEVEL_OUTOF10: 8
PAINLEVEL_OUTOF10: 0

## 2024-11-11 ASSESSMENT — PAIN DESCRIPTION - ONSET: ONSET: PROGRESSIVE

## 2024-11-11 ASSESSMENT — PAIN SCALES - WONG BAKER: WONGBAKER_NUMERICALRESPONSE: NO HURT

## 2024-11-11 ASSESSMENT — PAIN DESCRIPTION - ORIENTATION: ORIENTATION: LEFT

## 2024-11-11 ASSESSMENT — PAIN DESCRIPTION - LOCATION: LOCATION: FLANK

## 2024-11-11 ASSESSMENT — PAIN DESCRIPTION - PAIN TYPE: TYPE: ACUTE PAIN

## 2024-11-11 ASSESSMENT — PAIN - FUNCTIONAL ASSESSMENT: PAIN_FUNCTIONAL_ASSESSMENT: ACTIVITIES ARE NOT PREVENTED

## 2024-11-11 ASSESSMENT — PAIN DESCRIPTION - DESCRIPTORS: DESCRIPTORS: ACHING;SORE

## 2024-11-11 ASSESSMENT — PAIN DESCRIPTION - FREQUENCY: FREQUENCY: INTERMITTENT

## 2024-11-11 NOTE — CARE COORDINATION
CM meet with patient to discuss transition plan. CM informed patient that if he goes to a long term care facility that his whole check will be forfieted and will remain with only $40. Patient said he would rather look for a place on his own. Patient income is $1,400 a month. I can give the patient resources for shelters and group homes. Hailey smart from [facility] told me that last week she told the  that patient does not meet criteria for SNF/LTC. Patient stated will look list. CM will follow up in am.    Monika Mena, MSN, RN  Care Manager    [place] [address]  Office: [phone]  Fax: [phone]     [facility] Care Managers are on-call evenings from 4:30 pm until 7:00 pm. After 7:00 pm, please contact Nurse’s Admin at ext. [phone] for LYFT rides only.

## 2024-11-11 NOTE — PROGRESS NOTES
[facility] Hospitalist Group  Progress Note  Date:2024       Room:[facility]  Patient Name:Delfin Shetty     YOB: 1955     Age:68 y.o.        Subjective      : Patient seen and examined. Exceptionally pleasant. States he has no family. Febrile overnight. No SOB.     Disposition: Does not meet criteria for SNF/LTC. Would ideally like to see WBC downtrend before discharge and patient afebrile for 24 hours     Objective         Vitals Last 24 Hours:  TEMPERATURE:  Temp  Av.3 °F (37.4 °C)  Min: 98.2 °F (36.8 °C)  Max: 100.4 °F (38 °C)  RESPIRATIONS RANGE: Resp  Av.8  Min: 18  Max: 20  PULSE OXIMETRY RANGE: SpO2  Av.1 %  Min: 92 %  Max: 99 %  PULSE RANGE: Pulse  Av.4  Min: 101  Max: 129  BLOOD PRESSURE RANGE: Systolic (24hrs), Av , Min:115 , Max:134     ; Diastolic (24hrs), Av, Min:61, Max:70      I/O (24Hr):    Intake/Output Summary (Last 24 hours) at 2024 1830  Last data filed at 2024 1741  Gross per 24 hour   Intake 836 ml   Output 1300 ml   Net -464 ml       /70   Pulse (!) 125   Temp 99.8 °F (37.7 °C) (Oral)   Resp 19   Ht 1.702 m (5' 7.01\")   Wt 56 kg (123 lb 7.3 oz)   SpO2 95%   BMI 19.33 kg/m²       General appearance - alert, ill appearing, cachectic, and in no distress  Neck - supple, no JVD, trachea is midline  Chest -diminished air entry noted in bases, no wheezes  Heart - S1 and S2 normal, tachycardia present  Abdomen - soft, nontender, nondistended, Bowel sounds present  Neurological -awake, follows over commands appropriately, moves all extremities, not in acute distress  Extremities - no pedal edema noted      Labs/Imaging/Diagnostics    Labs:  CBC:  Recent Labs     11/10/24  0251 24  0226   WBC 17.6* 18.6*   RBC 2.94* 2.87*   HGB 8.4* 8.1*   HCT 26.9* 26.3*   MCV 91.5 91.6   RDW 13.2 13.3   * 525*       CHEMISTRIES:  Recent Labs     11/10/24  0251      K 3.8      CO2 23

## 2024-11-11 NOTE — PLAN OF CARE
Problem: Discharge Planning  Goal: Discharge to home or other facility with appropriate resources  Flowsheets (Taken 11/11/2024 173)  Discharge to home or other facility with appropriate resources:   Identify barriers to discharge with patient and caregiver   Arrange for needed discharge resources and transportation as appropriate  Note: Medicaid pending barrier to discharge has been identify.     Problem: Safety - Adult  Goal: Free from fall injury  Description: Gripper socks, call bell in reach  Flowsheets (Taken 11/11/2024 1735)  Free From Fall Injury: Instruct family/caregiver on patient safety  Note: Encourage patient to use call bell as needed with mobility.        Problem: Skin/Tissue Integrity  Goal: Absence of new skin breakdown  Description: 1.  Monitor for areas of redness and/or skin breakdown  2.  Assess vascular access sites hourly  3.  Every 4-6 hours minimum:  Change oxygen saturation probe site  4.  Every 4-6 hours:  If on nasal continuous positive airway pressure, respiratory therapy assess nares and determine need for appliance change or resting period.  Note: RN to monitor skin daily and every shift.      Problem: Respiratory - Adult  Goal: Achieves optimal ventilation and oxygenation  Flowsheets (Taken 11/11/2024 0732)  Achieves optimal ventilation and oxygenation:   Assess for changes in respiratory status   Assess for changes in mentation and behavior   Position to facilitate oxygenation and minimize respiratory effort   Respiratory therapy support as indicated     Problem: Cardiovascular - Adult  Goal: Maintains optimal cardiac output and hemodynamic stability  Flowsheets (Taken 11/11/2024 0732)  Maintains optimal cardiac output and hemodynamic stability: Monitor blood pressure and heart rate     Problem: Cardiovascular - Adult  Goal: Absence of cardiac dysrhythmias or at baseline  Flowsheets (Taken 11/11/2024 0732)  Absence of cardiac dysrhythmias or at baseline: Monitor cardiac rate and  function:   Assess bowel function   Encourage oral fluids to ensure adequate hydration     Problem: Gastrointestinal - Adult  Goal: Maintains adequate nutritional intake  Recent Flowsheet Documentation  Taken 11/11/2024 0732 by Lorraine Rothman RN  Maintains adequate nutritional intake: Monitor intake and output, weight and lab values     Problem: Infection - Adult  Goal: Absence of infection at discharge  Recent Flowsheet Documentation  Taken 11/11/2024 0732 by Lorraine Rothman RN  Absence of infection at discharge:   Assess and monitor for signs and symptoms of infection   Monitor lab/diagnostic results   Monitor all insertion sites i.e., indwelling lines, tubes and drains   Administer medications as ordered     Problem: Infection - Adult  Goal: Absence of infection during hospitalization  Recent Flowsheet Documentation  Taken 11/11/2024 0732 by Lorraine Rothman RN  Absence of infection during hospitalization:   Assess and monitor for signs and symptoms of infection   Administer medications as ordered     Problem: Infection - Adult  Goal: Absence of fever/infection during anticipated neutropenic period  Recent Flowsheet Documentation  Taken 11/11/2024 0732 by Lorraine Rothman RN  Absence of fever/infection during anticipated neutropenic period: Monitor white blood cell count     Problem: Metabolic/Fluid and Electrolytes - Adult  Goal: Electrolytes maintained within normal limits  Recent Flowsheet Documentation  Taken 11/11/2024 0732 by Lorraine Rothman RN  Electrolytes maintained within normal limits: Administer electrolyte replacement as ordered     Problem: Metabolic/Fluid and Electrolytes - Adult  Goal: Hemodynamic stability and optimal renal function maintained  Recent Flowsheet Documentation  Taken 11/11/2024 0732 by Lorraine Rothman RN  Hemodynamic stability and optimal renal function maintained:   Monitor labs and assess for signs and symptoms of volume excess or deficit   Monitor intake,

## 2024-11-11 NOTE — CONSULTS
medication use, vitals, and chart     FINAL COMMENTS   Thank you for allowing Palliative Medicine to participate in the care of Delfin Shetty.    Only check if applicable and billing time based rather than MDM  [x] The total encounter time on this service date was _75___ minutes which was spent performing a face-to-face encounter and personally completing the provider-level activities documented in the note. This includes time spent prior to the visit and after the visit in direct care of the patient. This time does not include time spent in any separately reportable services.    Electronically signed by   Gerda Ji [facility]  Palliative Care Team  on 11/12/2024 at 8:17 AM

## 2024-11-11 NOTE — ACP (ADVANCE CARE PLANNING)
Advance Care Planning       Palliative Team Advance Care Planning (ACP) Conversation        Date of Conversation: 11/11/24      Individuals present for the conversation: Patient with decision making capacity, Gerda Ji NP (Palliative) and this writer.        ACP documents on file prior to discussion:  -Portable DNR      Previously completed document/s not on file: Patient / participant reports that there are no previously executed ACP documents.      Healthcare Decision Maker:    Patient did not have a formal healthcare decision maker document, on file. A new advance medical directive (AMD) was completed with patient and signed by patient. Patient named his friend (Shruthi Roth) as his primary decision maker.       Primary Decision Maker: Shruthi Roth - Friend - [phone]       Conversation Summary:      This writer, along with Gerda Ji NP, with the Palliative Care team; visited with patient today to offer support. Patient was laying in bed; alert and oriented. Patient reported that he lives alone and that his is independent with his ADLs and IADLs. Patient went on to report that he walks independently; without the use of any durable medical equipment. Patient plans to return home, once medical stable for discharge.     Patient was then asked about healthcare decision maker(s). Patient reported that his friend (Shruthi Roth) would be the person that he trusts enough to make medical decisions on his behalf. A new advance medical directive (AMD) was completed with patient and signed by patient. As it relates to goals of care moving forward; patient already made the decision that he did not want attempts at resuscitation, in the event that his heart and breathing were to stop.     Patient has a newly created DDNR document, that has now been scanned in to the EMR. At this time, patient is a DNR/DNI.      Resuscitation Status:   Code Status: DNR/DNI       Documentation Completed:  -Advance Medical

## 2024-11-11 NOTE — PLAN OF CARE
Problem: Discharge Planning  Goal: Discharge to home or other facility with appropriate resources  Outcome: Progressing  Note: Patient will be discharged to appropriate facility upon discharge.     Problem: Safety - Adult  Goal: Free from fall injury  Description: Gripper socks, call bell in reach  Outcome: Progressing  Note: Patient will be free from falls/ injuries during this admission. Fall precautions in place at all times.     Problem: Skin/Tissue Integrity  Goal: Absence of new skin breakdown  Description: 1.  Monitor for areas of redness and/or skin breakdown  2.  Assess vascular access sites hourly  3.  Every 4-6 hours minimum:  Change oxygen saturation probe site  4.  Every 4-6 hours:  If on nasal continuous positive airway pressure, respiratory therapy assess nares and determine need for appliance change or resting period.  Outcome: Progressing  Note: Patient will be free from any new skin breakdown this admission. Skin assessment every shift.     Problem: Respiratory - Adult  Goal: Achieves optimal ventilation and oxygenation  Outcome: Progressing  Flowsheets (Taken 11/10/2024 2030)  Achieves optimal ventilation and oxygenation: Assess for changes in respiratory status  Note: Patient's respiratory status will be monitored and maintained within expected limits every shift.     Problem: Skin/Tissue Integrity - Adult  Goal: Skin integrity remains intact  Recent Flowsheet Documentation  Taken 11/10/2024 1949 by Gretchen Powell RN  Skin Integrity Remains Intact: Monitor for areas of redness and/or skin breakdown     Problem: Chronic Conditions and Co-morbidities  Goal: Patient's chronic conditions and co-morbidity symptoms are monitored and maintained or improved  Outcome: Progressing  Note: Patient's chronic conditions and co morbidity will be monitored and maintained every shift.

## 2024-11-11 NOTE — PROGRESS NOTES
Comprehensive Nutrition Assessment    Type and Reason for Visit:  Reassess    Nutrition Recommendations/Plan:   Continue current diet as tolerated.  Continue oral supplements: Ensure Plus High Protein (each provides 350 kcal, 20g protein) TID  Continue multivitamin supplementation daily, Daily wts.  Continue to monitor tolerance of PO, compliance of oral supplements, weight, labs, and plan of care during admission.     Malnutrition Assessment:  Malnutrition Status:  Severe malnutrition (10/28/24 1133)    Context:  Chronic Illness     Findings of the 6 clinical characteristics of malnutrition:  Energy Intake:  75% or less estimated energy requirements for 1 month or longer  Weight Loss:  Greater than 7.5% over 3 months     Body Fat Loss:  Severe body fat loss Triceps, Buccal region   Muscle Mass Loss:  Severe muscle mass loss Temples (temporalis), Clavicles (pectoralis & deltoids), Thigh (quadriceps), Scapula (trapezius), Hand (interosseous), Calf (gastrocnemius)  Fluid Accumulation:  No significant fluid accumulation     Strength:  Not Performed    Nutrition Assessment:    Pt admitted for management of severe sepsis.  Pt seen for follow up; awake and oriented. Per pt appetite is improving and is consuming 50-75% of meals/ONS. Bed weight obtained; 64 kg (140.8 lbs), question accuracy of bed scale. Pt had no nutrition related questions/concerns at this time. Will continue to monitor per protocol.      Meal Intake: Provides on average ~90% kcal, 100% protein of estimated needs  Patient Vitals for the past 168 hrs:   PO Meals Eaten (%)   11/11/24 0931 51 - 75%   11/09/24 1519 26 - 50%   11/08/24 1001 76 - 100%   11/05/24 1700 76 - 100%   11/05/24 1300 51 - 75%   11/05/24 0900 76 - 100%       Nutrition Related Findings:    Pertinent Meds:   Lipitor  Lovenox  MVI  Protonix  Zosyn   Pertinent Labs:  Recent Labs     11/10/24  0251   GLUCOSE 141*   BUN 13   CREATININE 0.61      K 3.8      CO2 23   CALCIUM 9.4

## 2024-11-12 PROBLEM — Z51.5 ENCOUNTER FOR PALLIATIVE CARE: Status: ACTIVE | Noted: 2024-11-12

## 2024-11-12 PROBLEM — Z71.89 GOALS OF CARE, COUNSELING/DISCUSSION: Status: ACTIVE | Noted: 2024-11-12

## 2024-11-12 LAB
ANION GAP SERPL CALC-SCNC: 9 MMOL/L (ref 3–18)
APPEARANCE UR: CLEAR
BACTERIA URNS QL MICRO: ABNORMAL /HPF
BILIRUB UR QL: NEGATIVE
BUN SERPL-MCNC: 12 MG/DL (ref 7–18)
BUN/CREAT SERPL: 19 (ref 12–20)
CALCIUM SERPL-MCNC: 9.3 MG/DL (ref 8.5–10.1)
CHLORIDE SERPL-SCNC: 108 MMOL/L (ref 100–111)
CO2 SERPL-SCNC: 23 MMOL/L (ref 21–32)
COLOR UR: ABNORMAL
CREAT SERPL-MCNC: 0.63 MG/DL (ref 0.6–1.3)
EPITH CASTS URNS QL MICRO: ABNORMAL /LPF (ref 0–5)
ERYTHROCYTE [DISTWIDTH] IN BLOOD BY AUTOMATED COUNT: 13.2 % (ref 11.6–14.5)
GLUCOSE SERPL-MCNC: 83 MG/DL (ref 74–99)
GLUCOSE UR STRIP.AUTO-MCNC: NEGATIVE MG/DL
HCT VFR BLD AUTO: 25.1 % (ref 36–48)
HGB BLD-MCNC: 7.8 G/DL (ref 13–16)
HGB UR QL STRIP: NEGATIVE
KETONES UR QL STRIP.AUTO: ABNORMAL MG/DL
LEUKOCYTE ESTERASE UR QL STRIP.AUTO: ABNORMAL
MCH RBC QN AUTO: 28.1 PG (ref 24–34)
MCHC RBC AUTO-ENTMCNC: 31.1 G/DL (ref 31–37)
MCV RBC AUTO: 90.3 FL (ref 78–100)
NITRITE UR QL STRIP.AUTO: NEGATIVE
NRBC # BLD: 0 K/UL (ref 0–0.01)
NRBC BLD-RTO: 0 PER 100 WBC
PH UR STRIP: 6 (ref 5–8)
PLATELET # BLD AUTO: 534 K/UL (ref 135–420)
PMV BLD AUTO: 8.7 FL (ref 9.2–11.8)
POTASSIUM SERPL-SCNC: 3.8 MMOL/L (ref 3.5–5.5)
PROCALCITONIN SERPL-MCNC: 0.09 NG/ML
PROT UR STRIP-MCNC: 30 MG/DL
RBC # BLD AUTO: 2.78 M/UL (ref 4.35–5.65)
RBC #/AREA URNS HPF: NEGATIVE /HPF (ref 0–5)
SODIUM SERPL-SCNC: 140 MMOL/L (ref 136–145)
SP GR UR REFRACTOMETRY: >1.03 (ref 1–1.04)
TRICHOMONAS UR QL MICRO: ABNORMAL
UROBILINOGEN UR QL STRIP.AUTO: 1 EU/DL (ref 0.2–1)
WBC # BLD AUTO: 19.8 K/UL (ref 4.6–13.2)
WBC URNS QL MICRO: ABNORMAL /HPF (ref 0–5)
YEAST URNS QL MICRO: ABNORMAL

## 2024-11-12 PROCEDURE — 99232 SBSQ HOSP IP/OBS MODERATE 35: CPT | Performed by: STUDENT IN AN ORGANIZED HEALTH CARE EDUCATION/TRAINING PROGRAM

## 2024-11-12 PROCEDURE — 6370000000 HC RX 637 (ALT 250 FOR IP): Performed by: STUDENT IN AN ORGANIZED HEALTH CARE EDUCATION/TRAINING PROGRAM

## 2024-11-12 PROCEDURE — 2580000003 HC RX 258: Performed by: INTERNAL MEDICINE

## 2024-11-12 PROCEDURE — 6370000000 HC RX 637 (ALT 250 FOR IP): Performed by: INTERNAL MEDICINE

## 2024-11-12 PROCEDURE — 6360000002 HC RX W HCPCS: Performed by: PHYSICIAN ASSISTANT

## 2024-11-12 PROCEDURE — 1100000000 HC RM PRIVATE

## 2024-11-12 PROCEDURE — 6370000000 HC RX 637 (ALT 250 FOR IP): Performed by: PHYSICIAN ASSISTANT

## 2024-11-12 PROCEDURE — 81001 URINALYSIS AUTO W/SCOPE: CPT

## 2024-11-12 PROCEDURE — 94640 AIRWAY INHALATION TREATMENT: CPT

## 2024-11-12 PROCEDURE — 80048 BASIC METABOLIC PNL TOTAL CA: CPT

## 2024-11-12 PROCEDURE — 36415 COLL VENOUS BLD VENIPUNCTURE: CPT

## 2024-11-12 PROCEDURE — 94761 N-INVAS EAR/PLS OXIMETRY MLT: CPT

## 2024-11-12 PROCEDURE — 87086 URINE CULTURE/COLONY COUNT: CPT

## 2024-11-12 PROCEDURE — 97535 SELF CARE MNGMENT TRAINING: CPT

## 2024-11-12 PROCEDURE — 2700000000 HC OXYGEN THERAPY PER DAY

## 2024-11-12 PROCEDURE — 94669 MECHANICAL CHEST WALL OSCILL: CPT

## 2024-11-12 PROCEDURE — 85027 COMPLETE CBC AUTOMATED: CPT

## 2024-11-12 PROCEDURE — 84145 PROCALCITONIN (PCT): CPT

## 2024-11-12 PROCEDURE — 6360000002 HC RX W HCPCS: Performed by: INTERNAL MEDICINE

## 2024-11-12 PROCEDURE — 6360000002 HC RX W HCPCS: Performed by: STUDENT IN AN ORGANIZED HEALTH CARE EDUCATION/TRAINING PROGRAM

## 2024-11-12 PROCEDURE — 2580000003 HC RX 258: Performed by: PHYSICIAN ASSISTANT

## 2024-11-12 RX ORDER — LEVOFLOXACIN 5 MG/ML
250 INJECTION, SOLUTION INTRAVENOUS EVERY 24 HOURS
Status: DISCONTINUED | OUTPATIENT
Start: 2024-11-12 | End: 2024-11-14 | Stop reason: ALTCHOICE

## 2024-11-12 RX ORDER — LEVOFLOXACIN 5 MG/ML
750 INJECTION, SOLUTION INTRAVENOUS EVERY 24 HOURS
Status: DISCONTINUED | OUTPATIENT
Start: 2024-11-12 | End: 2024-11-12

## 2024-11-12 RX ORDER — LEVOFLOXACIN 5 MG/ML
500 INJECTION, SOLUTION INTRAVENOUS EVERY 24 HOURS
Status: DISCONTINUED | OUTPATIENT
Start: 2024-11-12 | End: 2024-11-14 | Stop reason: ALTCHOICE

## 2024-11-12 RX ADMIN — PIPERACILLIN AND TAZOBACTAM 3375 MG: 3; .375 INJECTION, POWDER, FOR SOLUTION INTRAVENOUS at 20:21

## 2024-11-12 RX ADMIN — IPRATROPIUM BROMIDE 0.5 MG: 0.5 SOLUTION RESPIRATORY (INHALATION) at 07:39

## 2024-11-12 RX ADMIN — SODIUM CHLORIDE, PRESERVATIVE FREE 10 ML: 5 INJECTION INTRAVENOUS at 08:49

## 2024-11-12 RX ADMIN — LEVOFLOXACIN 500 MG: 500 INJECTION, SOLUTION INTRAVENOUS at 19:11

## 2024-11-12 RX ADMIN — OXYCODONE HYDROCHLORIDE AND ACETAMINOPHEN 1 TABLET: 10; 325 TABLET ORAL at 17:18

## 2024-11-12 RX ADMIN — METOPROLOL TARTRATE 25 MG: 25 TABLET, FILM COATED ORAL at 08:45

## 2024-11-12 RX ADMIN — PANTOPRAZOLE SODIUM 40 MG: 40 TABLET, DELAYED RELEASE ORAL at 07:35

## 2024-11-12 RX ADMIN — ATORVASTATIN CALCIUM 10 MG: 10 TABLET, FILM COATED ORAL at 20:22

## 2024-11-12 RX ADMIN — ACETAMINOPHEN 325MG 650 MG: 325 TABLET ORAL at 15:35

## 2024-11-12 RX ADMIN — SODIUM CHLORIDE, PRESERVATIVE FREE 10 ML: 5 INJECTION INTRAVENOUS at 20:22

## 2024-11-12 RX ADMIN — PIPERACILLIN AND TAZOBACTAM 3375 MG: 3; .375 INJECTION, POWDER, FOR SOLUTION INTRAVENOUS at 04:28

## 2024-11-12 RX ADMIN — BUDESONIDE 1 MG: 1 SUSPENSION RESPIRATORY (INHALATION) at 19:38

## 2024-11-12 RX ADMIN — LEVOFLOXACIN 250 MG: 250 INJECTION, SOLUTION INTRAVENOUS at 18:02

## 2024-11-12 RX ADMIN — PIPERACILLIN AND TAZOBACTAM 3375 MG: 3; .375 INJECTION, POWDER, FOR SOLUTION INTRAVENOUS at 11:09

## 2024-11-12 RX ADMIN — METOPROLOL TARTRATE 25 MG: 25 TABLET, FILM COATED ORAL at 20:22

## 2024-11-12 RX ADMIN — ENOXAPARIN SODIUM 40 MG: 100 INJECTION SUBCUTANEOUS at 08:47

## 2024-11-12 RX ADMIN — ARFORMOTEROL TARTRATE 15 MCG: 15 SOLUTION RESPIRATORY (INHALATION) at 07:39

## 2024-11-12 RX ADMIN — IPRATROPIUM BROMIDE 0.5 MG: 0.5 SOLUTION RESPIRATORY (INHALATION) at 19:38

## 2024-11-12 RX ADMIN — ARFORMOTEROL TARTRATE 15 MCG: 15 SOLUTION RESPIRATORY (INHALATION) at 19:38

## 2024-11-12 RX ADMIN — THERA TABS 1 TABLET: TAB at 08:45

## 2024-11-12 RX ADMIN — BUDESONIDE 1 MG: 1 SUSPENSION RESPIRATORY (INHALATION) at 07:39

## 2024-11-12 ASSESSMENT — PAIN SCALES - GENERAL
PAINLEVEL_OUTOF10: 0
PAINLEVEL_OUTOF10: 7
PAINLEVEL_OUTOF10: 0

## 2024-11-12 ASSESSMENT — PAIN DESCRIPTION - PAIN TYPE: TYPE: ACUTE PAIN

## 2024-11-12 ASSESSMENT — PAIN - FUNCTIONAL ASSESSMENT: PAIN_FUNCTIONAL_ASSESSMENT: ACTIVITIES ARE NOT PREVENTED

## 2024-11-12 ASSESSMENT — PAIN DESCRIPTION - ORIENTATION: ORIENTATION: LEFT

## 2024-11-12 ASSESSMENT — PAIN DESCRIPTION - ONSET: ONSET: ON-GOING

## 2024-11-12 ASSESSMENT — PAIN DESCRIPTION - DESCRIPTORS: DESCRIPTORS: ACHING

## 2024-11-12 ASSESSMENT — PAIN DESCRIPTION - FREQUENCY: FREQUENCY: INTERMITTENT

## 2024-11-12 ASSESSMENT — PAIN DESCRIPTION - LOCATION: LOCATION: HIP

## 2024-11-12 NOTE — PROGRESS NOTES
Hematology / Oncology Progress Note      Patient: Delfin Shetty  Gender: male   MRN: [id number]    YOB: 1955 Age: 68 y.o.   CSN: [id number]    LOS:  LOS: 20 days   Admit Date: 10/23/2024     PCP: Jenifer Luz APRN - NP    Date of evaluation: 11/12/2024     Assessment:       ICD-10-CM    1. Sepsis due to pneumonia (HCC)  J18.9     A41.9       2. Pneumonia of left lung due to infectious organism, unspecified part of lung  J18.9       3. Large pleural effusion  J90       4. Acute respiratory failure with hypoxemia  J96.01       5. Bullous emphysema (HCC)  J43.9       6. Hypokalemia  E87.6       7. Empyema lung (HCC)  J86.9            1.  Large left malignant pleural effusion secondary to poorly differentiated carcinoma [squamous cell lung carcinoma] status post thoracentesis with pleural biopsy and left chest tube placement on 10/24/2024  2.  Left lateral apex masslike consolidation likely primary lesion  3.  Acute hypoxic respiratory failure secondary to COPD, large left pleural effusion and newly diagnosed lung cancer  4.  5 pound unintentional weight loss secondary to malignancy  6.  Neutrophilic leukocytosis, thrombocytosis and eosinophilia secondary to malignancy  7.  Anemia, normocytic, normochromic most likely secondary to malignancy     Pertinent imaging:  10/23/2024 CT chest PE protocol showed no evidence of pulmonary embolus or right heart strain.     There is a huge relatively loculated subpulmonic pleural effusion in the lower  chest depressing an inverting the left hemidiaphragm and displace the abdominal  contents. There is complete atelectasis of the left lower lobe and much of the  lingula. There is a combination of atelectasis and peripheral consolidation  involving the left upper lobe with additional peripheral somewhat loculated  fluid. There is a somewhat masslike consolidation in the lateral left apex which  is somewhat for possible mass and should be monitored on  any questions or concerns please feel free to contact the dictating provider for clarification.

## 2024-11-12 NOTE — CARE COORDINATION
CM meet with patient to discuss discharge plan and has decided to go back to his cousins house ([address] [facility] [address]), because the group homes are to expensive. Patient stated his friend is going to pick him up. CM informed Dr Cavanaugh via [employer] and is waiting on response. CM will continue to follow.      Monika Mena, MSN, RN  Care Manager    [place] [address]  Office: [phone]  Fax: [phone]     [facility] Care Managers are on-call evenings from 4:30 pm until 7:00 pm. After 7:00 pm, please contact Nurse’s Admin at ext. [phone] for LYFT rides only.

## 2024-11-12 NOTE — PLAN OF CARE
Problem: Discharge Planning  Goal: Discharge to home or other facility with appropriate resources  11/12/2024 1121 by Bre Gage RN  Outcome: Progressing  Flowsheets (Taken 11/12/2024 0849)  Discharge to home or other facility with appropriate resources:   Identify barriers to discharge with patient and caregiver   Arrange for needed discharge resources and transportation as appropriate  11/12/2024 0107 by Edith Cruz RN  Outcome: Progressing  Flowsheets (Taken 11/11/2024 2030)  Discharge to home or other facility with appropriate resources: Identify barriers to discharge with patient and caregiver     Problem: Safety - Adult  Goal: Free from fall injury  Description: Gripper socks, call bell in reach  11/12/2024 1121 by Bre Gage RN  Outcome: Progressing  Flowsheets (Taken 11/12/2024 1121)  Free From Fall Injury:   Instruct family/caregiver on patient safety   Based on caregiver fall risk screen, instruct family/caregiver to ask for assistance with transferring infant if caregiver noted to have fall risk factors  11/12/2024 0107 by Edith Cruz RN  Outcome: Progressing  Note: Safety measures in place per protocol. Bed in lowest position, call bell within reach, and bed alarm in place.     Problem: Skin/Tissue Integrity  Goal: Absence of new skin breakdown  Description: 1.  Monitor for areas of redness and/or skin breakdown  2.  Assess vascular access sites hourly  3.  Every 4-6 hours minimum:  Change oxygen saturation probe site  4.  Every 4-6 hours:  If on nasal continuous positive airway pressure, respiratory therapy assess nares and determine need for appliance change or resting period.  Outcome: Progressing     Problem: Respiratory - Adult  Goal: Achieves optimal ventilation and oxygenation  Recent Flowsheet Documentation  Taken 11/12/2024 0849 by Bre Gage, RN  Achieves optimal ventilation and oxygenation:   Assess for changes in respiratory status   Oxygen supplementation based  neutropenic period: Monitor white blood cell count

## 2024-11-12 NOTE — PLAN OF CARE
Problem: Occupational Therapy - Adult  Goal: By Discharge: Performs self-care activities at highest level of function for planned discharge setting.  See evaluation for individualized goals.  Description: Occupational Therapy Goals:  Initiated 10/25/2024, re-evaluated 10/31/2024, continue goals to be met within 7-10 days. Re-evaluated 11/10/24, continue goals to be met within 7-10 days.    1.  Patient will perform grooming with supervision/set-up standing at sink with good balance.   2.  Patient will perform bathing with supervision/set-up.  3.  Patient will perform lower body dressing with supervision/set-up.  4.  Patient will perform toilet transfers with supervision/set-up  5.  Patient will perform all aspects of toileting with supervision/set-up.  6.  Patient will participate in upper extremity therapeutic exercise/activities with supervision/set-up for 8-10 minutes to increase strength/endurance for ADLs.    7.  Patient will utilize energy conservation techniques during functional activities with verbal cues.    PLOF: Pt lives alone in shed, 2 steps to enter, tub only, previously independent with ADLs.      Outcome: Progressing   OCCUPATIONAL THERAPY TREATMENT    Patient: Delfin Shetty (68 y.o. male)  Date: 11/12/2024  Diagnosis: Hypokalemia [E87.6]  Empyema lung (HCC) [J86.9]  Bullous emphysema (HCC) [J43.9]  Acute respiratory failure with hypoxemia [J96.01]  Sepsis due to pneumonia (HCC) [J18.9, A41.9]  Pneumonia of left lung due to infectious organism, unspecified part of lung [J18.9]  Large pleural effusion [J90] Severe sepsis (HCC)      Precautions: General Precautions, Fall Risk    Chart, occupational therapy assessment, plan of care, and goals were reviewed.  ASSESSMENT:  Pt c/o perspiration upon entry. Bre FRANCIS, reports low grade fever. Pt initially agreeable to bed level activity, performing simple ADL grooming tasks. Pt c/o fatigue and declines further OT intervention. , SpO2% 92 on 5L

## 2024-11-12 NOTE — PROGRESS NOTES
4 Eyes Skin Assessment     NAME:  Delfin Shetty  YOB: 1955  MEDICAL RECORD NUMBER:  [id number]    The patient is being assessed for  Shift Handoff    I agree that at least one RN has performed a thorough Head to Toe Skin Assessment on the patient. ALL assessment sites listed below have been assessed.      Areas assessed by both nurses:    Head, Face, Ears, Shoulders, Back, Chest, Arms, Elbows, Hands, Sacrum. Buttock, Coccyx, Ischium, Legs. Feet and Heels, and Under Medical Devices         Does the Patient have a Wound? No noted wound(s)       Valentin Prevention initiated by RN: Yes  Wound Care Orders initiated by RN: No    Pressure Injury (Stage 3,4, Unstageable, DTI, NWPT, and Complex wounds) if present, place Wound referral order by RN under : No    New Ostomies, if present place, Ostomy referral order under : No     Nurse 1 eSignature: Electronically signed by Edith Cruz RN on 11/12/24 at 7:38 AM EST    **SHARE this note so that the co-signing nurse can place an eSignature**    Nurse 2 eSignature: Electronically signed by Bre Gage RN on 11/12/24 at 8:42 AM EST

## 2024-11-12 NOTE — PLAN OF CARE
Problem: Discharge Planning  Goal: Discharge to home or other facility with appropriate resources  11/12/2024 0107 by Edith Cruz RN  Outcome: Progressing  Flowsheets (Taken 11/11/2024 2030)  Discharge to home or other facility with appropriate resources: Identify barriers to discharge with patient and caregiver     Problem: Safety - Adult  Goal: Free from fall injury  Description: Gripper socks, call bell in reach  11/12/2024 0107 by Edith Cruz RN  Outcome: Progressing  Note: Safety measures in place per protocol. Bed in lowest position, call bell within reach, and bed alarm in place.     Problem: Respiratory - Adult  Goal: Achieves optimal ventilation and oxygenation  11/12/2024 0107 by Edith Cruz RN  Outcome: Progressing  Note: O2 in place via nasal cannula.     Problem: Cardiovascular - Adult  Goal: Maintains optimal cardiac output and hemodynamic stability  11/12/2024 0107 by Edith Cruz RN  Outcome: Progressing  Note: Monitor for s/s of cardiac distress.     Problem: Cardiovascular - Adult  Goal: Absence of cardiac dysrhythmias or at baseline  11/12/2024 0107 by Edith Cruz RN  Outcome: Progressing  Flowsheets (Taken 11/11/2024 2030)  Absence of cardiac dysrhythmias or at baseline: Monitor cardiac rate and rhythm     Problem: Skin/Tissue Integrity - Adult  Goal: Skin integrity remains intact  11/12/2024 0107 by Edith Cruz RN  Outcome: Progressing  Note: Assess puncture site and skin integrity where chest tube was placed.      Problem: Musculoskeletal - Adult  Goal: Return mobility to safest level of function  11/12/2024 0107 by Edith Cruz RN  Outcome: Completed

## 2024-11-12 NOTE — PROGRESS NOTES
Infectious Disease Progress Note  (Telephone/electronic health record consultation)    Reconsulted for low grade fever, persistent leukocytosis        Current abx Prior abx   Pip/tazo since 10/23/24-10/29; restarted 11/10/24 Vancomycin 10/23-10/24     Lines:       Assessment :   68 y.o. male with a PMHx of tobacco abuse, HTN, HLD who presented to the ED on 10/23/24 with c/o SOB associated with cough and chest pain ongoing for the past 2 weeks.    I agree that initial clinical presentation is concerning for sepsis/empyema.  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    Status post thoracentesis, chest tube insertion on 10/24/2024.  No neutrophils seen on pleural fluid argues against empyema.  Pleural fluid cultures 10/24-no organisms  Biopsy positive for squamous cell carcinoma    Now with intermittent low grade fever, persistent leukocytosis  This could represent SIRS due to progressive malignancy.   R/o post obstructive pneumonia due to pip/tazo resistant gram negatives, gram positives  R/o evolving UTI      Recommendations:    Continue pip/tazo. Add levofloxacin to cover for pip/tazo resistant gram negatives  Obtain procalcitonin, UA, nasal MRSA swab  Management of malignant pleural effusion per primary team, oncology  Follow up oncology recommendations regarding lung cancer  Monitor cbc, temp, procalcitonin, clinically to determine need for further work up    Above plan discussed in details with dr. Cavanaugh, RN. Total time spent >30 min. Please call me if any further questions or concerns. thanks      HPI:        Past Medical History:   Diagnosis Date    HLD (hyperlipidemia) 10/23/2024    Primary hypertension 10/23/2024       Past Surgical History:   Procedure Laterality Date    CT BIOPSY SOFT TISSUE NECK   650 mg  650 mg Oral Q6H PRN    Or    acetaminophen (TYLENOL) suppository 650 mg  650 mg Rectal Q6H PRN    atorvastatin (LIPITOR) tablet 10 mg  10 mg Oral Nightly    budesonide (PULMICORT) nebulizer suspension 1 mg  1 mg Nebulization BID    arformoterol tartrate (BROVANA) nebulizer solution 15 mcg  15 mcg Nebulization BID RT       Allergies: Patient has no known allergies.    No family history on file.  Social History     Socioeconomic History    Marital status: Single     Spouse name: Not on file    Number of children: Not on file    Years of education: Not on file    Highest education level: Not on file   Occupational History    Not on file   Tobacco Use    Smoking status: Former     Types: Cigars     Quit date: 7/15/2024     Years since quittin.3     Passive exposure: Current    Smokeless tobacco: Never   Vaping Use    Vaping status: Never Used   Substance and Sexual Activity    Alcohol use: Not Currently     Alcohol/week: 4.0 standard drinks of alcohol     Types: 4 Cans of beer per week     Comment: quit 2 year ago    Drug use: Not Currently     Types: Marijuana (Weed)    Sexual activity: Not on file   Other Topics Concern    Not on file   Social History Narrative    Not on file     Social Determinants of Health     Financial Resource Strain: Not on file   Food Insecurity: No Food Insecurity (10/23/2024)    Hunger Vital Sign     Worried About Running Out of Food in the Last Year: Never true     Ran Out of Food in the Last Year: Never true   Transportation Needs: No Transportation Needs (10/23/2024)    PRAPARE - Transportation     Lack of Transportation (Medical): No     Lack of Transportation (Non-Medical): No   Physical Activity: Not on file   Stress: Not on file   Social Connections: Not on file   Intimate Partner Violence: Not on file   Housing Stability: Low Risk  (10/23/2024)    Housing Stability Vital Sign     Unable to Pay for Housing in the Last Year: No     Number of Times Moved in the Last Year: 1

## 2024-11-12 NOTE — PROGRESS NOTES
benefits and burdens of CPR, shocks and intubation in the event of cardiopulmonary arrest in presence of Ms. Roth.  Patient verbalized understanding about it.  He mentioned when time comes time comes he does not want any heroic measure as he has been suffering a lot in last few weeks.  He completed DNR form in presence of Ms. Roth.      - Pulm signed off on 11/2  - PT/OT, out of bed to chair, ambulation as tolerated.    - Per pulm, can start Trelegy Ellipta 200 mcg daily along with albuterol HFA as needed and DuoNebs.   - Await results of PD-L1 testing on lung biopsy specimen.  - Patient will follow up with oncology outpatient next week to review lab work and discuss palliative chemotherapy versus immunotherapy versus chemoimmunotherapy.    Total time greater than 50 minutes    Case discussed with:  [x]Patient  []Family  [x]Nursing  []Case Management  DVT Prophylaxis:  [x]Lovenox  []Hep SQ  []SCDs  []Coumadin   []Heparin gtt   []Xarelto   []Eliquis    Radha Cavanaugh, DO    Disclaimer: Sections of this note are dictated using utilizing voice recognition software, which may have resulted in some phonetic based errors in grammar and contents. Even though attempts were made to correct all the mistakes, some may have been missed, and remained in the body of the document. If questions arise, please contact our department.

## 2024-11-13 ENCOUNTER — HOSPITAL ENCOUNTER (OUTPATIENT)
Facility: HOSPITAL | Age: 69
Setting detail: RECURRING SERIES
Discharge: HOME | End: 2024-11-16

## 2024-11-13 ENCOUNTER — APPOINTMENT (OUTPATIENT)
Facility: HOSPITAL | Age: 69
End: 2024-11-13
Payer: MEDICARE

## 2024-11-13 ENCOUNTER — HOSPITAL ENCOUNTER (OUTPATIENT)
Facility: HOSPITAL | Age: 69
Setting detail: RECURRING SERIES
Discharge: HOME | End: 2024-11-16
Attending: RADIOLOGY
Payer: MEDICARE

## 2024-11-13 ENCOUNTER — HOSPITAL ENCOUNTER (OUTPATIENT)
Facility: HOSPITAL | Age: 69
Setting detail: RECURRING SERIES
Discharge: HOME | End: 2024-11-16
Attending: RADIOLOGY

## 2024-11-13 LAB
ALBUMIN SERPL-MCNC: 1.4 G/DL (ref 3.4–5)
ALBUMIN/GLOB SERPL: 0.3 (ref 0.8–1.7)
ALP SERPL-CCNC: 94 U/L (ref 45–117)
ALT SERPL-CCNC: 22 U/L (ref 16–61)
ANION GAP SERPL CALC-SCNC: 8 MMOL/L (ref 3–18)
AST SERPL-CCNC: 19 U/L (ref 10–38)
BILIRUB DIRECT SERPL-MCNC: 0.3 MG/DL (ref 0–0.2)
BILIRUB SERPL-MCNC: 0.6 MG/DL (ref 0.2–1)
BUN SERPL-MCNC: 12 MG/DL (ref 7–18)
BUN/CREAT SERPL: 18 (ref 12–20)
CALCIUM SERPL-MCNC: 9.2 MG/DL (ref 8.5–10.1)
CHLORIDE SERPL-SCNC: 106 MMOL/L (ref 100–111)
CO2 SERPL-SCNC: 24 MMOL/L (ref 21–32)
CREAT SERPL-MCNC: 0.68 MG/DL (ref 0.6–1.3)
ERYTHROCYTE [DISTWIDTH] IN BLOOD BY AUTOMATED COUNT: 13.2 % (ref 11.6–14.5)
GLOBULIN SER CALC-MCNC: 4.8 G/DL (ref 2–4)
GLUCOSE SERPL-MCNC: 84 MG/DL (ref 74–99)
HCT VFR BLD AUTO: 23.9 % (ref 36–48)
HGB BLD-MCNC: 7.4 G/DL (ref 13–16)
MCH RBC QN AUTO: 27.7 PG (ref 24–34)
MCHC RBC AUTO-ENTMCNC: 31 G/DL (ref 31–37)
MCV RBC AUTO: 89.5 FL (ref 78–100)
NRBC # BLD: 0 K/UL (ref 0–0.01)
NRBC BLD-RTO: 0 PER 100 WBC
PLATELET # BLD AUTO: 526 K/UL (ref 135–420)
PMV BLD AUTO: 8.8 FL (ref 9.2–11.8)
POTASSIUM SERPL-SCNC: 3.8 MMOL/L (ref 3.5–5.5)
PROCALCITONIN SERPL-MCNC: 0.15 NG/ML
PROT SERPL-MCNC: 6.2 G/DL (ref 6.4–8.2)
RBC # BLD AUTO: 2.67 M/UL (ref 4.35–5.65)
SODIUM SERPL-SCNC: 138 MMOL/L (ref 136–145)
WBC # BLD AUTO: 18 K/UL (ref 4.6–13.2)

## 2024-11-13 PROCEDURE — 99232 SBSQ HOSP IP/OBS MODERATE 35: CPT | Performed by: STUDENT IN AN ORGANIZED HEALTH CARE EDUCATION/TRAINING PROGRAM

## 2024-11-13 PROCEDURE — 6360000002 HC RX W HCPCS: Performed by: STUDENT IN AN ORGANIZED HEALTH CARE EDUCATION/TRAINING PROGRAM

## 2024-11-13 PROCEDURE — 6360000004 HC RX CONTRAST MEDICATION: Performed by: INTERNAL MEDICINE

## 2024-11-13 PROCEDURE — 6370000000 HC RX 637 (ALT 250 FOR IP): Performed by: PHYSICIAN ASSISTANT

## 2024-11-13 PROCEDURE — 80048 BASIC METABOLIC PNL TOTAL CA: CPT

## 2024-11-13 PROCEDURE — 6360000002 HC RX W HCPCS: Performed by: INTERNAL MEDICINE

## 2024-11-13 PROCEDURE — 2700000000 HC OXYGEN THERAPY PER DAY

## 2024-11-13 PROCEDURE — 94640 AIRWAY INHALATION TREATMENT: CPT

## 2024-11-13 PROCEDURE — 6370000000 HC RX 637 (ALT 250 FOR IP): Performed by: STUDENT IN AN ORGANIZED HEALTH CARE EDUCATION/TRAINING PROGRAM

## 2024-11-13 PROCEDURE — 2580000003 HC RX 258: Performed by: PHYSICIAN ASSISTANT

## 2024-11-13 PROCEDURE — 94761 N-INVAS EAR/PLS OXIMETRY MLT: CPT

## 2024-11-13 PROCEDURE — 71260 CT THORAX DX C+: CPT

## 2024-11-13 PROCEDURE — 84145 PROCALCITONIN (PCT): CPT

## 2024-11-13 PROCEDURE — 2580000003 HC RX 258: Performed by: INTERNAL MEDICINE

## 2024-11-13 PROCEDURE — 80076 HEPATIC FUNCTION PANEL: CPT

## 2024-11-13 PROCEDURE — 1100000000 HC RM PRIVATE

## 2024-11-13 PROCEDURE — 6370000000 HC RX 637 (ALT 250 FOR IP): Performed by: INTERNAL MEDICINE

## 2024-11-13 PROCEDURE — 6360000002 HC RX W HCPCS: Performed by: PHYSICIAN ASSISTANT

## 2024-11-13 PROCEDURE — 36415 COLL VENOUS BLD VENIPUNCTURE: CPT

## 2024-11-13 PROCEDURE — 85027 COMPLETE CBC AUTOMATED: CPT

## 2024-11-13 RX ORDER — IOPAMIDOL 612 MG/ML
100 INJECTION, SOLUTION INTRAVASCULAR
Status: COMPLETED | OUTPATIENT
Start: 2024-11-13 | End: 2024-11-13

## 2024-11-13 RX ADMIN — IPRATROPIUM BROMIDE 0.5 MG: 0.5 SOLUTION RESPIRATORY (INHALATION) at 20:02

## 2024-11-13 RX ADMIN — PIPERACILLIN AND TAZOBACTAM 3375 MG: 3; .375 INJECTION, POWDER, FOR SOLUTION INTRAVENOUS at 03:54

## 2024-11-13 RX ADMIN — METOPROLOL TARTRATE 25 MG: 25 TABLET, FILM COATED ORAL at 08:24

## 2024-11-13 RX ADMIN — LEVOFLOXACIN 250 MG: 250 INJECTION, SOLUTION INTRAVENOUS at 18:05

## 2024-11-13 RX ADMIN — OXYCODONE HYDROCHLORIDE AND ACETAMINOPHEN 1 TABLET: 10; 325 TABLET ORAL at 18:07

## 2024-11-13 RX ADMIN — IPRATROPIUM BROMIDE 0.5 MG: 0.5 SOLUTION RESPIRATORY (INHALATION) at 07:40

## 2024-11-13 RX ADMIN — LEVOFLOXACIN 500 MG: 500 INJECTION, SOLUTION INTRAVENOUS at 19:53

## 2024-11-13 RX ADMIN — SODIUM CHLORIDE, PRESERVATIVE FREE 10 ML: 5 INJECTION INTRAVENOUS at 08:26

## 2024-11-13 RX ADMIN — PIPERACILLIN AND TAZOBACTAM 3375 MG: 3; .375 INJECTION, POWDER, FOR SOLUTION INTRAVENOUS at 19:55

## 2024-11-13 RX ADMIN — PANTOPRAZOLE SODIUM 40 MG: 40 TABLET, DELAYED RELEASE ORAL at 05:52

## 2024-11-13 RX ADMIN — ACETAMINOPHEN 325MG 650 MG: 325 TABLET ORAL at 03:54

## 2024-11-13 RX ADMIN — SODIUM CHLORIDE, PRESERVATIVE FREE 10 ML: 5 INJECTION INTRAVENOUS at 20:45

## 2024-11-13 RX ADMIN — ARFORMOTEROL TARTRATE 15 MCG: 15 SOLUTION RESPIRATORY (INHALATION) at 07:40

## 2024-11-13 RX ADMIN — PIPERACILLIN AND TAZOBACTAM 3375 MG: 3; .375 INJECTION, POWDER, FOR SOLUTION INTRAVENOUS at 11:06

## 2024-11-13 RX ADMIN — METOPROLOL TARTRATE 25 MG: 25 TABLET, FILM COATED ORAL at 20:44

## 2024-11-13 RX ADMIN — THERA TABS 1 TABLET: TAB at 08:24

## 2024-11-13 RX ADMIN — BUDESONIDE 1 MG: 1 SUSPENSION RESPIRATORY (INHALATION) at 07:40

## 2024-11-13 RX ADMIN — ENOXAPARIN SODIUM 40 MG: 100 INJECTION SUBCUTANEOUS at 08:26

## 2024-11-13 RX ADMIN — ATORVASTATIN CALCIUM 10 MG: 10 TABLET, FILM COATED ORAL at 20:44

## 2024-11-13 RX ADMIN — BUDESONIDE 1 MG: 1 SUSPENSION RESPIRATORY (INHALATION) at 20:02

## 2024-11-13 RX ADMIN — IOPAMIDOL 100 ML: 612 INJECTION, SOLUTION INTRAVENOUS at 12:29

## 2024-11-13 RX ADMIN — ARFORMOTEROL TARTRATE 15 MCG: 15 SOLUTION RESPIRATORY (INHALATION) at 20:02

## 2024-11-13 ASSESSMENT — PAIN DESCRIPTION - LOCATION: LOCATION: RIB CAGE

## 2024-11-13 ASSESSMENT — PAIN SCALES - GENERAL
PAINLEVEL_OUTOF10: 0
PAINLEVEL_OUTOF10: 8
PAINLEVEL_OUTOF10: 0

## 2024-11-13 ASSESSMENT — PAIN DESCRIPTION - ONSET: ONSET: ON-GOING

## 2024-11-13 ASSESSMENT — PAIN DESCRIPTION - FREQUENCY: FREQUENCY: INTERMITTENT

## 2024-11-13 ASSESSMENT — PAIN DESCRIPTION - DESCRIPTORS: DESCRIPTORS: ACHING

## 2024-11-13 ASSESSMENT — PAIN DESCRIPTION - PAIN TYPE: TYPE: ACUTE PAIN

## 2024-11-13 ASSESSMENT — PAIN - FUNCTIONAL ASSESSMENT: PAIN_FUNCTIONAL_ASSESSMENT: ACTIVITIES ARE NOT PREVENTED

## 2024-11-13 ASSESSMENT — PAIN DESCRIPTION - ORIENTATION: ORIENTATION: LEFT

## 2024-11-13 NOTE — PROGRESS NOTES
Hematology / Oncology Progress Note      Patient: Delfin Shetty  Gender: male   MRN: [id number]    YOB: 1955 Age: 68 y.o.   CSN: [id number]    LOS:  LOS: 21 days   Admit Date: 10/23/2024     PCP: Jenifer Luz APRN - NP    Date of evaluation: 11/13/2024     Assessment:       ICD-10-CM    1. Sepsis due to pneumonia (HCC)  J18.9     A41.9       2. Pneumonia of left lung due to infectious organism, unspecified part of lung  J18.9       3. Large pleural effusion  J90       4. Acute respiratory failure with hypoxemia  J96.01       5. Bullous emphysema (HCC)  J43.9       6. Hypokalemia  E87.6       7. Empyema lung (HCC)  J86.9            1.  Large left malignant pleural effusion secondary to poorly differentiated carcinoma [squamous cell lung carcinoma] status post thoracentesis with pleural biopsy and left chest tube placement on 10/24/2024  2.  Left lateral apex masslike consolidation likely primary lesion  3.  Acute hypoxic respiratory failure secondary to COPD, large left pleural effusion and newly diagnosed lung cancer  4.  5 pound unintentional weight loss secondary to malignancy  6.  Neutrophilic leukocytosis, thrombocytosis and eosinophilia secondary to malignancy  7.  Anemia, normocytic, normochromic most likely secondary to malignancy     Pertinent imaging:  10/23/2024 CT chest PE protocol showed no evidence of pulmonary embolus or right heart strain.     There is a huge relatively loculated subpulmonic pleural effusion in the lower  chest depressing an inverting the left hemidiaphragm and displace the abdominal  contents. There is complete atelectasis of the left lower lobe and much of the  lingula. There is a combination of atelectasis and peripheral consolidation  involving the left upper lobe with additional peripheral somewhat loculated  fluid. There is a somewhat masslike consolidation in the lateral left apex which  is somewhat for possible mass and should be monitored on  0552    ipratropium (ATROVENT) 0.02 % nebulizer solution 0.5 mg, 0.5 mg, Nebulization, BID, Ruy, Redd Nguyen, DO, 0.5 mg at 11/13/24 0740    oxyCODONE-acetaminophen (PERCOCET)  MG per tablet 1 tablet, 1 tablet, Oral, Q4H PRN, Júnior Colon MD, 1 tablet at 11/12/24 1718    diatrizoate meglumine-sodium (GASTROGRAFIN) 66-10 % solution 30 mL, 30 mL, Oral, ONCE PRN, Sebastien Recinos MD, 30 mL at 10/29/24 1334    multivitamin 1 tablet, 1 tablet, Oral, Daily, Júnior Colon MD, 1 tablet at 11/13/24 0824    sodium chloride flush 0.9 % injection 5-40 mL, 5-40 mL, IntraVENous, 2 times per day, Millie Sam PA, 10 mL at 11/13/24 0826    sodium chloride flush 0.9 % injection 5-40 mL, 5-40 mL, IntraVENous, PRN, Millie Sam PA, 10 mL at 11/11/24 1115    0.9 % sodium chloride infusion, , IntraVENous, PRN, Millie Sam PA, Stopped at 10/29/24 2112    magnesium sulfate 2000 mg in 50 mL IVPB premix, 2,000 mg, IntraVENous, PRN, Millie Sam PA    enoxaparin (LOVENOX) injection 40 mg, 40 mg, SubCUTAneous, Daily, Júnior Colon MD, 40 mg at 11/13/24 0826    ondansetron (ZOFRAN-ODT) disintegrating tablet 4 mg, 4 mg, Oral, Q8H PRN **OR** ondansetron (ZOFRAN) injection 4 mg, 4 mg, IntraVENous, Q6H PRN, Millie Sam PA    polyethylene glycol (GLYCOLAX) packet 17 g, 17 g, Oral, Daily PRN, Millie Sam PA, 17 g at 11/04/24 0936    bisacodyl (DULCOLAX) suppository 10 mg, 10 mg, Rectal, Daily PRN, Millie Sam PA    acetaminophen (TYLENOL) tablet 650 mg, 650 mg, Oral, Q6H PRN, 650 mg at 11/13/24 0354 **OR** acetaminophen (TYLENOL) suppository 650 mg, 650 mg, Rectal, Q6H PRN, Millie Sam PA    atorvastatin (LIPITOR) tablet 10 mg, 10 mg, Oral, Nightly, Millie Sam PA, 10 mg at 11/12/24 2022    budesonide (PULMICORT) nebulizer suspension 1 mg, 1 mg, Nebulization, BID, Millie Sam PA, 1 mg at 11/13/24 0740    arformoterol tartrate (BROVANA)

## 2024-11-13 NOTE — PLAN OF CARE
Problem: Safety - Adult  Goal: Free from fall injury  Description: Gripper socks, call bell in reach  Outcome: Progressing  Note: Patient will remain fall free this admission by ensuring bed alarm is on, call bell and personal belongings are within reach.      Problem: Skin/Tissue Integrity  Goal: Absence of new skin breakdown  Description: 1.  Monitor for areas of redness and/or skin breakdown  2.  Assess vascular access sites hourly  3.  Every 4-6 hours minimum:  Change oxygen saturation probe site  4.  Every 4-6 hours:  If on nasal continuous positive airway pressure, respiratory therapy assess nares and determine need for appliance change or resting period.  Outcome: Progressing  Note: Patient will remain free from new skin breakdown this admission.  Patient will be turned every 2 hours.  Patient will be encouraged to turn themselves.      Problem: Respiratory - Adult  Goal: Achieves optimal ventilation and oxygenation  Outcome: Progressing  Note: Patients oxygen saturation will be monitored to maintain adequate oxygen levels.  Supplemental oxygen will be worn to maintain adequate oxygenation.     Problem: Chronic Conditions and Co-morbidities  Goal: Patient's chronic conditions and co-morbidity symptoms are monitored and maintained or improved  Outcome: Progressing  Note: Patients labs and vitals will be monitored for signs and symptoms of infection and sepsis.       Problem: Nutrition Deficit:  Goal: Optimize nutritional status  Outcome: Progressing  Note: Patient will be encouraged to eat meals.  Patient will be encouraged to drink supplements.

## 2024-11-13 NOTE — CONSULTS
Interventional Radiology     Consult received from Dr. Cavanaugh for evaluation of left pleural effusion.    68 year old male with PMH of tobacco use, HTN, HLD who presented to [facility] on 10/23/24 for dyspnea with cough and chest pain. Patient had CT of chest that showed large left pleural effusion and chest tube was placed and left pleural biopsy performed 10/24/24. Pathology reported poorly differentiated carcinoma, favor squamous cell carcinoma. Patient received lytics via chest tube to assist in draining loculated left pleural effusion and chest tubes were removed 10/31/24. Patient had chest xray performed 11/9/24 which shows recollection of pleural effusion  IR consulted for pleur-x placement.     Case and images reviewed by Dr. Farfan.   Patient has loculated pleural effusion and enlarging left pleural mass. Loculated effusions will not drain well with pleur-x placement. Risk of procedure outweighs benefit.   Patient is not a good candidate for pleur-x placement.        Thank you,  Inez Albright, PA  [phone]

## 2024-11-13 NOTE — PLAN OF CARE
activity tolerance for standing, transferring and ambulating with or without assistive devices   Assist with transfers and ambulation using safe patient handling equipment as needed   Ensure adequate protection for wounds/incisions during mobilization   Apply continuous passive motion per provider or physical therapy orders to increase flexion toward goal     Problem: Gastrointestinal - Adult  Goal: Maintains or returns to baseline bowel function  Recent Flowsheet Documentation  Taken 11/13/2024 0830 by Bre Gage RN  Maintains or returns to baseline bowel function:   Assess bowel function   Administer IV fluids as ordered to ensure adequate hydration   Administer ordered medications as needed   Encourage mobilization and activity  Goal: Maintains adequate nutritional intake  Recent Flowsheet Documentation  Taken 11/13/2024 0830 by Bre Gage RN  Maintains adequate nutritional intake:   Monitor percentage of each meal consumed   Assist with meals as needed     Problem: Infection - Adult  Goal: Absence of infection at discharge  Recent Flowsheet Documentation  Taken 11/13/2024 0830 by Bre Gage RN  Absence of infection at discharge:   Assess and monitor for signs and symptoms of infection   Monitor lab/diagnostic results   Administer medications as ordered   Instruct and encourage patient and family to use good hand hygiene technique   Identify and instruct in appropriate isolation precautions for identified infection/condition  Goal: Absence of infection during hospitalization  Recent Flowsheet Documentation  Taken 11/13/2024 0830 by Bre Gage RN  Absence of infection during hospitalization:   Assess and monitor for signs and symptoms of infection   Monitor lab/diagnostic results   Administer medications as ordered  Goal: Absence of fever/infection during anticipated neutropenic period  Recent Flowsheet Documentation  Taken 11/13/2024 0830 by Bre Gage RN  Absence of fever/infection  during anticipated neutropenic period: Monitor white blood cell count     Problem: Metabolic/Fluid and Electrolytes - Adult  Goal: Electrolytes maintained within normal limits  Recent Flowsheet Documentation  Taken 11/13/2024 0830 by Bre Gage RN  Electrolytes maintained within normal limits:   Monitor labs and assess patient for signs and symptoms of electrolyte imbalances   Administer electrolyte replacement as ordered   Monitor response to electrolyte replacements, including repeat lab results as appropriate  Goal: Hemodynamic stability and optimal renal function maintained  Recent Flowsheet Documentation  Taken 11/13/2024 0830 by Bre Gage RN  Hemodynamic stability and optimal renal function maintained:   Monitor labs and assess for signs and symptoms of volume excess or deficit   Monitor intake, output and patient weight     Problem: Musculoskeletal - Adult  Goal: Return mobility to safest level of function  Recent Flowsheet Documentation  Taken 11/13/2024 0830 by Bre Gage RN  Return Mobility to Safest Level of Function:   Assess patient stability and activity tolerance for standing, transferring and ambulating with or without assistive devices   Assist with transfers and ambulation using safe patient handling equipment as needed   Ensure adequate protection for wounds/incisions during mobilization   Apply continuous passive motion per provider or physical therapy orders to increase flexion toward goal

## 2024-11-13 NOTE — CONSULTS
RADIATION ONCOLOGY CONSULTATION  11/13/2024    Patient: MIGUEL SHETTY        YOB: 1955  Patient ID: [id number]       Referring MD:  Sebastien Recinos  Radiation Oncologist: Dr. Luis Manuel Ramirez  Patient Diagnosis:  C34.12 - Malignant neoplasm of upper lobe, left bronchus or lung, Diagnosed 11/13/2024 (Active)    AJCC Staging has been reviewed.    IDENTIFICATION:   68 year old male with newly diagnosed Stage IV LEFT lung NSCLC s/p thoracentesis with pleural biopsy and left chest tube placement on 10/24/24 (since removed) and post-obstructive pneumonia, referred for palliative radiation.    HISTORY OF PRESENT ILLNESS: Mr. Shetty is a 68 year old male with left lung cancer. He presented to the ED on 10/23/24 with SOB, cough, and pain. CT A chest showed  a somewhat masslike consolidation in the lateral left apex. He underwent biopsy of the l;eft ppleural masses on 10/24/24. Pathology reported poorly differentiated carcinoma, favor squamous cell carcinoma CT ches abdomen pelvis reported the midiastinum having a 1.2 cm right hilar lymph node and 1.9 cm short axis proximal left hilar node. MRI brain from 10/29/24 was negative for evidence of metastasis.     10/23/24: CTA chest - There is a somewhat masslike consolidation in the lateral left apex which is somewhat for possible mass and should be monitored. The mediastinal contents have been shifted completely into the right chest. There is no definite evidence of mediastinal adenopathy but the left hilum is distorted and could obscure some lymph nodes.    10/24/24: Left pleural masses biopsy - poorly differentiated carcinoma, favor squamous cell carcinoma    10/29/24: CT C/A/P - Mediastinum: 1.2 cm right hilar lymph node. 1.9 cm short axis proximal left hilar node. The diffuse irregular pleural thickening described above is particularly significant at the left hilum. Enlarged proximal left hilar node is likely metastatic. Mildly enlarged right hilar node  ecchymosis.       TIME AND COUNSELING:  Up to 60 minutes were spent with the patient  alone   acquiring the vital information vital to the case.  The patient was examined to verify findings as related in the HPI, as well as other areas as needed.  Time was allowed for all questions about the proposed course of care to be answered.  Greater than 50% time was spent face to face with the patient in counseling and coordination of care.     Impression:   68 year old male with newly diagnosed Stage IV LEFT lung NSCLC s/p thoracentesis with pleural biopsy and left chest tube placement on 10/24/24 (since removed) and post-obstructive pneumonia, referred for palliative radiation.    Plan:   Discussed role of palliative XRT to relieve obstruction with Mr. Shetty.  Discussed potential side effects of pneumonitis, lung scarring/damage that could increase risk for needing oxygen support, esophagitis and esophageal scarring that could require surgery, pericarditis/heart injury, pain, nerve damage.  He expressed an understanding and wants to proceed.    Plan to treat the primary mass and local earline disease to 30 Gy in 10 fractions for palliation.   Plan to ambulance patient to our office today for CT simulation, plan to start radiation Thursday, November 14. Will arrange ambulance transport while he is inpatient.    Treatments to be delivered at our [facility] office, Monday-Friday    LOCATION OF SERVICE:  [facility]    ELECTRONICALLY SIGNED BY:  Dr. Luis Manuel Ramirez M.D.  Date: 11/13/2024

## 2024-11-13 NOTE — PROGRESS NOTES
Infectious Disease Progress Note      Reconsulted for low grade fever, persistent leukocytosis        Current abx Prior abx   Pip/tazo since 10/23/24-10/29; restarted 11/10/24 Vancomycin 10/23-10/24     Lines:       Assessment :   68 y.o. male with a PMHx of tobacco abuse, HTN, HLD who presented to the ED on 10/23/24 with c/o SOB associated with cough and chest pain ongoing for the past 2 weeks.    recent SIRS due to malignant pleural effusion, probable post obstructive pneumonia  Status post thoracentesis, chest tube insertion on 10/24/2024.  No neutrophils seen on pleural fluid argues against empyema.  Pleural fluid cultures 10/24-no organisms  Biopsy positive for squamous cell carcinoma    Now with intermittent low grade fever, persistent leukocytosis  This likely SIRS due to progressive malignancy, probable reaccummulation of malignant pleural effusion alongwith      post obstructive pneumonia   Low procalcitonin argues against bacterial sepsis as the sole cause of patient's fever/leukocytosis    R/o partially treated UTI  Oncology follow-up appreciated.  Started on radiation treatment today.  Some improvement in fever/leukocytosis noted today    Recommendations:    Continue pip/tazo, levofloxacin to cover for pip/tazo resistant gram negatives  Follow-up nasal MRSA swab  Add on urine culture to urine specimen 11/12  Obtain CT chest/abdomen/pelvis to evaluate for progression of malignancy, worsening malignant pleural effusion, need for Pleurx catheter  Follow up oncology recommendations regarding lung cancer  Monitor cbc, temp, procalcitonin, clinically to determine need for further work up    Above plan discussed in details with dr. Cavanaugh RN. . Please call me if any further questions or concerns. thanks      HPI:    Complains of congestion in the chest.  Unable to cough up.  Denies subjective fever, chills.  No pleuritic chest pain.  No abdominal pain, nausea, vomiting, diarrhea/constipation.  Denies dysuria or  Status: Canceled Specimen: Blood     Culture, Blood 2 [[id number]]     Order Status: Canceled Specimen: Blood                 RADIOLOGY:    All available imaging studies/reports in [facility] for this admission were reviewed    High complexity decision making was performed during the evaluation of this patient at high risk for decompensation      Above mentioned total time spent on reviewing the case/medical record/data/notes/EMR/patient examination/documentation/coordinating care with nurse/consultants, exclusive of procedures with complex decision making performed and > 50% time spent in face to face evaluation.        Disclaimer: Sections of this note are dictated utilizing voice recognition software, which may have resulted in some phonetic based errors in grammar and contents. Even though attempts were made to correct all the mistakes, some may have been missed, and remained in the body of the document. If questions arise, please contact our department.    Dr. Adelia Recinos, Infectious Disease Specialist  [phone]  November 13, 2024  8:04 AM

## 2024-11-14 ENCOUNTER — HOSPITAL ENCOUNTER (OUTPATIENT)
Facility: HOSPITAL | Age: 69
Setting detail: RECURRING SERIES
Discharge: HOME | End: 2024-11-17
Attending: RADIOLOGY
Payer: MEDICARE

## 2024-11-14 LAB
ANION GAP SERPL CALC-SCNC: 8 MMOL/L (ref 3–18)
BACTERIA SPEC CULT: ABNORMAL
BACTERIA SPEC CULT: NORMAL
BUN SERPL-MCNC: 9 MG/DL (ref 7–18)
BUN/CREAT SERPL: 13 (ref 12–20)
CALCIUM SERPL-MCNC: 9.2 MG/DL (ref 8.5–10.1)
CHLORIDE SERPL-SCNC: 104 MMOL/L (ref 100–111)
CO2 SERPL-SCNC: 26 MMOL/L (ref 21–32)
CREAT SERPL-MCNC: 0.68 MG/DL (ref 0.6–1.3)
ERYTHROCYTE [DISTWIDTH] IN BLOOD BY AUTOMATED COUNT: 13.3 % (ref 11.6–14.5)
GLUCOSE SERPL-MCNC: 95 MG/DL (ref 74–99)
HCT VFR BLD AUTO: 25.1 % (ref 36–48)
HGB BLD-MCNC: 7.7 G/DL (ref 13–16)
MCH RBC QN AUTO: 27.9 PG (ref 24–34)
MCHC RBC AUTO-ENTMCNC: 30.7 G/DL (ref 31–37)
MCV RBC AUTO: 90.9 FL (ref 78–100)
NRBC # BLD: 0 K/UL (ref 0–0.01)
NRBC BLD-RTO: 0 PER 100 WBC
PLATELET # BLD AUTO: 536 K/UL (ref 135–420)
PMV BLD AUTO: 8.8 FL (ref 9.2–11.8)
POTASSIUM SERPL-SCNC: 3.7 MMOL/L (ref 3.5–5.5)
RAD ONC ARIA COURSE FIRST TREATMENT DATE: NORMAL
RAD ONC ARIA COURSE ID: NORMAL
RAD ONC ARIA COURSE INTENT: NORMAL
RAD ONC ARIA COURSE LAST TREATMENT DATE: NORMAL
RAD ONC ARIA COURSE SESSION NUMBER: 1
RAD ONC ARIA COURSE START DATE: NORMAL
RAD ONC ARIA COURSE TREATMENT ELAPSED DAYS: 0
RAD ONC ARIA PLAN FRACTIONS TREATED TO DATE: 1
RAD ONC ARIA PLAN ID: NORMAL
RAD ONC ARIA PLAN PRESCRIBED DOSE PER FRACTION: 3 GY
RAD ONC ARIA PLAN PRIMARY REFERENCE POINT: NORMAL
RAD ONC ARIA PLAN TOTAL FRACTIONS PRESCRIBED: 10
RAD ONC ARIA PLAN TOTAL PRESCRIBED DOSE: 3000 CGY
RAD ONC ARIA REFERENCE POINT DOSAGE GIVEN TO DATE: 3 GY
RAD ONC ARIA REFERENCE POINT ID: NORMAL
RAD ONC ARIA REFERENCE POINT SESSION DOSAGE GIVEN: 3 GY
RBC # BLD AUTO: 2.76 M/UL (ref 4.35–5.65)
SERVICE CMNT-IMP: ABNORMAL
SERVICE CMNT-IMP: NORMAL
SODIUM SERPL-SCNC: 138 MMOL/L (ref 136–145)
WBC # BLD AUTO: 17.5 K/UL (ref 4.6–13.2)

## 2024-11-14 PROCEDURE — 80048 BASIC METABOLIC PNL TOTAL CA: CPT

## 2024-11-14 PROCEDURE — 77280 THER RAD SIMULAJ FIELD SMPL: CPT

## 2024-11-14 PROCEDURE — 94640 AIRWAY INHALATION TREATMENT: CPT

## 2024-11-14 PROCEDURE — 1100000000 HC RM PRIVATE

## 2024-11-14 PROCEDURE — 6360000002 HC RX W HCPCS: Performed by: STUDENT IN AN ORGANIZED HEALTH CARE EDUCATION/TRAINING PROGRAM

## 2024-11-14 PROCEDURE — 6370000000 HC RX 637 (ALT 250 FOR IP): Performed by: INTERNAL MEDICINE

## 2024-11-14 PROCEDURE — 85027 COMPLETE CBC AUTOMATED: CPT

## 2024-11-14 PROCEDURE — 2700000000 HC OXYGEN THERAPY PER DAY

## 2024-11-14 PROCEDURE — 2580000003 HC RX 258: Performed by: PHYSICIAN ASSISTANT

## 2024-11-14 PROCEDURE — 99232 SBSQ HOSP IP/OBS MODERATE 35: CPT | Performed by: STUDENT IN AN ORGANIZED HEALTH CARE EDUCATION/TRAINING PROGRAM

## 2024-11-14 PROCEDURE — 6360000002 HC RX W HCPCS: Performed by: INTERNAL MEDICINE

## 2024-11-14 PROCEDURE — 6370000000 HC RX 637 (ALT 250 FOR IP): Performed by: STUDENT IN AN ORGANIZED HEALTH CARE EDUCATION/TRAINING PROGRAM

## 2024-11-14 PROCEDURE — 2580000003 HC RX 258: Performed by: INTERNAL MEDICINE

## 2024-11-14 PROCEDURE — 36415 COLL VENOUS BLD VENIPUNCTURE: CPT

## 2024-11-14 PROCEDURE — 6370000000 HC RX 637 (ALT 250 FOR IP): Performed by: PHYSICIAN ASSISTANT

## 2024-11-14 PROCEDURE — 6360000002 HC RX W HCPCS: Performed by: PHYSICIAN ASSISTANT

## 2024-11-14 PROCEDURE — 97535 SELF CARE MNGMENT TRAINING: CPT

## 2024-11-14 PROCEDURE — 94761 N-INVAS EAR/PLS OXIMETRY MLT: CPT

## 2024-11-14 PROCEDURE — 77412 RADIATION TX DELIVERY LVL 3: CPT

## 2024-11-14 RX ORDER — DOXYCYCLINE 100 MG/1
100 CAPSULE ORAL 2 TIMES DAILY
Status: DISCONTINUED | OUTPATIENT
Start: 2024-11-14 | End: 2024-11-16 | Stop reason: HOSPADM

## 2024-11-14 RX ORDER — LEVOFLOXACIN 750 MG/1
750 TABLET, FILM COATED ORAL DAILY
Status: DISCONTINUED | OUTPATIENT
Start: 2024-11-14 | End: 2024-11-14

## 2024-11-14 RX ADMIN — THERA TABS 1 TABLET: TAB at 08:27

## 2024-11-14 RX ADMIN — ARFORMOTEROL TARTRATE 15 MCG: 15 SOLUTION RESPIRATORY (INHALATION) at 10:17

## 2024-11-14 RX ADMIN — PIPERACILLIN AND TAZOBACTAM 3375 MG: 3; .375 INJECTION, POWDER, FOR SOLUTION INTRAVENOUS at 10:48

## 2024-11-14 RX ADMIN — PIPERACILLIN AND TAZOBACTAM 3375 MG: 3; .375 INJECTION, POWDER, FOR SOLUTION INTRAVENOUS at 03:54

## 2024-11-14 RX ADMIN — SODIUM CHLORIDE, PRESERVATIVE FREE 10 ML: 5 INJECTION INTRAVENOUS at 22:57

## 2024-11-14 RX ADMIN — METOPROLOL TARTRATE 25 MG: 25 TABLET, FILM COATED ORAL at 08:27

## 2024-11-14 RX ADMIN — BUDESONIDE 1 MG: 1 SUSPENSION RESPIRATORY (INHALATION) at 19:07

## 2024-11-14 RX ADMIN — IPRATROPIUM BROMIDE 0.5 MG: 0.5 SOLUTION RESPIRATORY (INHALATION) at 10:18

## 2024-11-14 RX ADMIN — BUDESONIDE 1 MG: 1 SUSPENSION RESPIRATORY (INHALATION) at 10:18

## 2024-11-14 RX ADMIN — METOPROLOL TARTRATE 25 MG: 25 TABLET, FILM COATED ORAL at 22:54

## 2024-11-14 RX ADMIN — ENOXAPARIN SODIUM 40 MG: 100 INJECTION SUBCUTANEOUS at 08:30

## 2024-11-14 RX ADMIN — IPRATROPIUM BROMIDE 0.5 MG: 0.5 SOLUTION RESPIRATORY (INHALATION) at 19:07

## 2024-11-14 RX ADMIN — DOXYCYCLINE HYCLATE 100 MG: 100 CAPSULE ORAL at 15:41

## 2024-11-14 RX ADMIN — SODIUM CHLORIDE, PRESERVATIVE FREE 10 ML: 5 INJECTION INTRAVENOUS at 12:49

## 2024-11-14 RX ADMIN — PANTOPRAZOLE SODIUM 40 MG: 40 TABLET, DELAYED RELEASE ORAL at 06:00

## 2024-11-14 RX ADMIN — AMOXICILLIN AND CLAVULANATE POTASSIUM 1 TABLET: 875; 125 TABLET, FILM COATED ORAL at 17:49

## 2024-11-14 RX ADMIN — DOXYCYCLINE HYCLATE 100 MG: 100 CAPSULE ORAL at 22:54

## 2024-11-14 RX ADMIN — ATORVASTATIN CALCIUM 10 MG: 10 TABLET, FILM COATED ORAL at 22:54

## 2024-11-14 RX ADMIN — OXYCODONE HYDROCHLORIDE AND ACETAMINOPHEN 1 TABLET: 10; 325 TABLET ORAL at 17:53

## 2024-11-14 RX ADMIN — ARFORMOTEROL TARTRATE 15 MCG: 15 SOLUTION RESPIRATORY (INHALATION) at 19:07

## 2024-11-14 ASSESSMENT — PAIN DESCRIPTION - LOCATION: LOCATION: CHEST

## 2024-11-14 ASSESSMENT — PAIN SCALES - GENERAL
PAINLEVEL_OUTOF10: 0
PAINLEVEL_OUTOF10: 8
PAINLEVEL_OUTOF10: 0
PAINLEVEL_OUTOF10: 0

## 2024-11-14 ASSESSMENT — PAIN DESCRIPTION - PAIN TYPE: TYPE: ACUTE PAIN

## 2024-11-14 ASSESSMENT — PAIN DESCRIPTION - ONSET: ONSET: GRADUAL

## 2024-11-14 ASSESSMENT — PAIN DESCRIPTION - DESCRIPTORS: DESCRIPTORS: ACHING;STABBING

## 2024-11-14 ASSESSMENT — PAIN DESCRIPTION - ORIENTATION: ORIENTATION: LEFT

## 2024-11-14 ASSESSMENT — PAIN - FUNCTIONAL ASSESSMENT: PAIN_FUNCTIONAL_ASSESSMENT: ACTIVITIES ARE NOT PREVENTED

## 2024-11-14 ASSESSMENT — PAIN DESCRIPTION - FREQUENCY: FREQUENCY: INTERMITTENT

## 2024-11-14 NOTE — PLAN OF CARE
Problem: Discharge Planning  Goal: Discharge to home or other facility with appropriate resources  Outcome: Progressing  Flowsheets (Taken 11/14/2024 0745)  Discharge to home or other facility with appropriate resources: Identify barriers to discharge with patient and caregiver     Problem: Safety - Adult  Goal: Free from fall injury  Description: Gripper socks, call bell in reach  Outcome: Progressing  Flowsheets (Taken 11/14/2024 0753)  Free From Fall Injury: Instruct family/caregiver on patient safety  Note: Kept bed alarm on at all times. Gripper socks maintained. Call bell within reach.     Problem: Respiratory - Adult  Goal: Achieves optimal ventilation and oxygenation  Outcome: Progressing  Flowsheets  Taken 11/14/2024 1137  Achieves optimal ventilation and oxygenation:   Assess for changes in respiratory status   Assess for changes in mentation and behavior   Assess and instruct to report shortness of breath or any respiratory difficulty  Taken 11/14/2024 0745  Achieves optimal ventilation and oxygenation: Assess for changes in respiratory status     Problem: Cardiovascular - Adult  Goal: Maintains optimal cardiac output and hemodynamic stability  Outcome: Progressing  Flowsheets  Taken 11/14/2024 1139  Maintains optimal cardiac output and hemodynamic stability:   Monitor blood pressure and heart rate   Monitor urine output and notify Licensed Independent Practitioner for values outside of normal range  Taken 11/14/2024 0745  Maintains optimal cardiac output and hemodynamic stability: Monitor blood pressure and heart rate     Problem: Cardiovascular - Adult  Goal: Absence of cardiac dysrhythmias or at baseline  Outcome: Progressing  Flowsheets (Taken 11/14/2024 0745)  Absence of cardiac dysrhythmias or at baseline: Monitor cardiac rate and rhythm     Problem: Skin/Tissue Integrity - Adult  Goal: Skin integrity remains intact  Outcome: Progressing  Flowsheets  Taken 11/14/2024 0753  Skin Integrity Remains  Intact: Monitor for areas of redness and/or skin breakdown  Taken 11/14/2024 0745  Skin Integrity Remains Intact: Monitor for areas of redness and/or skin breakdown  Note: Encourage patient to move and turn side to side every 2 hours.     Problem: Skin/Tissue Integrity - Adult  Goal: Incisions, wounds, or drain sites healing without S/S of infection  Recent Flowsheet Documentation  Taken 11/14/2024 0753 by Garret Ferguson RN  Incisions, Wounds, or Drain Sites Healing Without Sign and Symptoms of Infection: TWICE DAILY: Assess and document skin integrity  Taken 11/14/2024 0745 by Garret Ferguson RN  Incisions, Wounds, or Drain Sites Healing Without Sign and Symptoms of Infection: TWICE DAILY: Assess and document skin integrity

## 2024-11-14 NOTE — PROGRESS NOTES
Comprehensive Nutrition Assessment    Type and Reason for Visit:  Reassess    Nutrition Recommendations/Plan:   Continue current diet as tolerated.  Continue oral supplements: Ensure Plus High Protein (each provides 350 kcal, 20g protein) TID  Continue multivitamin supplementation daily, Daily wts.  Continue to monitor tolerance of PO, compliance of oral supplements, weight, labs, and plan of care during admission.     Malnutrition Assessment:  Malnutrition Status:  Severe malnutrition (10/28/24 1133)    Context:  Chronic Illness     Findings of the 6 clinical characteristics of malnutrition:  Energy Intake:  75% or less estimated energy requirements for 1 month or longer  Weight Loss:  Greater than 7.5% over 3 months     Body Fat Loss:  Severe body fat loss Triceps, Buccal region   Muscle Mass Loss:  Severe muscle mass loss Temples (temporalis), Clavicles (pectoralis & deltoids), Thigh (quadriceps), Scapula (trapezius), Hand (interosseous), Calf (gastrocnemius)  Fluid Accumulation:  No significant fluid accumulation     Strength:  Not Performed    Nutrition Assessment:    Pt admitted for management of severe sepsis; lung carcinoma. S/p first radiation treatment today. Pt seen for follow up; awake and oriented. Per pt appetite is the same as last RD visit (11/11), consuming 50-75% of meals/ONS. Bed weight obtained; 55.9 kg (123.1 lbs). Pt had no nutrition related questions/concerns at this time. Will continue to monitor per protocol.        Meal Intake: Provides on average ~70% kcal, 88% protein of estimated needs  Patient Vitals for the past 168 hrs:   PO Meals Eaten (%)   11/13/24 1648 51 - 75%   11/13/24 1325 51 - 75%   11/13/24 0830 76 - 100%   11/12/24 1451 26 - 50%   11/12/24 1011 51 - 75%   11/11/24 1741 0%   11/11/24 1404 0%   11/11/24 0931 51 - 75%   11/09/24 1519 26 - 50%   11/08/24 1001 76 - 100%       Nutrition Related Findings:    Pertinent Meds:    Lipitor  Lovenox  Levaquin  MVI  Protonix  Augmentin Pertinent Labs:  Recent Labs     11/13/24  0319 11/14/24  0205   GLUCOSE 84 95   BUN 12 9   CREATININE 0.68 0.68    138   K 3.8 3.7    104   CO2 24 26   CALCIUM 9.2 9.2     No results for input(s): \"POCGLU\" in the last 72 hours.    Last BM: 11/13/24    Skin: Wound Type:  (chest tube, sternal puncture)     Edema: None      Current Nutrition Intake & Therapies:    Average Meal Intake: 51-75%  Average Supplements Intake: %  ADULT DIET; Regular  ADULT ORAL NUTRITION SUPPLEMENT; Breakfast, Lunch, Dinner; Standard High Calorie/High Protein Oral Supplement    Anthropometric Measures:  Height: 170.2 cm (5' 7.01\")  Ideal Body Weight (IBW): 148 lbs (67 kg)    Admission Body Weight: 58.5 kg (128 lb 15.5 oz)  Current Body Weight: 56 kg (123 lb 7.3 oz), 83.4 % IBW. Weight Source: Bed scale  Current BMI (kg/m2): 19.3  Usual Body Weight: 70.3 kg (155 lb)  % Weight Change (Calculated): -17.9  Weight Adjustment For: No Adjustment    BMI Categories: Underweight (BMI less than 22) age over 65    Estimated Daily Nutrient Needs:  Energy Requirements Based On: Kcal/kg  Weight Used for Energy Requirements: Current  Energy (kcal/day): 4056-4773 kcal (30-35 kcal/kg), underweight  Weight Used for Protein Requirements: Current  Protein (g/day): 58-69 g (1-1.2 g/kg)  Method Used for Fluid Requirements: 1 ml/kcal  Fluid (ml/day): 6368-1821 mL or per MD    Nutrition Diagnosis:   Severe malnutrition, In context of chronic illness (possible mesothelioma/metastatic lung cancer) related to inadequate protein-energy intake as evidenced by Criteria as identified in malnutrition assessment    Underweight related to increase demand for energy/nutrients, inadequate protein-energy intake as evidenced by BMI, weight loss    Nutrition Interventions:   Food and/or Nutrient Delivery: Continue Current Diet, Vitamin Supplement, Continue Oral Nutrition Supplement  Nutrition

## 2024-11-14 NOTE — PROGRESS NOTES
Value Units Date/Time    Culture, Urine [[id number]] Collected: 11/12/24 2031    Order Status: Sent Specimen: Urine, clean catch Updated: 11/13/24 2348    Culture, MRSA, Screening [[id number]] Collected: 11/12/24 1800    Order Status: Sent Specimen: Nares Updated: 11/13/24 0751    Culture, Blood 1 [[id number]]     Order Status: Canceled Specimen: Blood     Culture, Blood 2 [[id number]]     Order Status: Canceled Specimen: Blood                 RADIOLOGY:    All available imaging studies/reports in [facility] for this admission were reviewed    High complexity decision making was performed during the evaluation of this patient at high risk for decompensation      Above mentioned total time spent on reviewing the case/medical record/data/notes/EMR/patient examination/documentation/coordinating care with nurse/consultants, exclusive of procedures with complex decision making performed and > 50% time spent in face to face evaluation.        Disclaimer: Sections of this note are dictated utilizing voice recognition software, which may have resulted in some phonetic based errors in grammar and contents. Even though attempts were made to correct all the mistakes, some may have been missed, and remained in the body of the document. If questions arise, please contact our department.    Dr. Adelia Recinos, Infectious Disease Specialist  [phone]  November 14, 2024  8:03 AM

## 2024-11-14 NOTE — PROGRESS NOTES
4 Eyes Skin Assessment     NAME:  Delfin Shetty  YOB: 1955  MEDICAL RECORD NUMBER:  [id number]    The patient is being assessed for  Shift Handoff    I agree that at least one RN has performed a thorough Head to Toe Skin Assessment on the patient. ALL assessment sites listed below have been assessed.      Areas assessed by both nurses:    Head, Face, Ears, Shoulders, Back, Chest, Arms, Elbows, Hands, Sacrum. Buttock, Coccyx, Ischium, Legs. Feet and Heels, and Under Medical Devices         Does the Patient have a Wound? No noted wound(s)       Valentin Prevention initiated by RN: No  Wound Care Orders initiated by RN: No    Pressure Injury (Stage 3,4, Unstageable, DTI, NWPT, and Complex wounds) if present, place Wound referral order by RN under : No    New Ostomies, if present place, Ostomy referral order under : No     Nurse 1 eSignature: Electronically signed by Bre Gage RN on 11/13/24 at 7:37 PM EST    **SHARE this note so that the co-signing nurse can place an eSignature**    Nurse 2 eSignature: Electronically signed by Garret Zavala RN on 11/14/24 at 7:37 AM EST

## 2024-11-14 NOTE — PROGRESS NOTES
[facility]  Progress Note  Date:2024       Room:[facility]  Patient Name:Delfin Shetty     YOB: 1955     Age:68 y.o.        Subjective      : Patient seen and examined. Discussed with him his need for FU with oncology once discharged - discussed what palliative treatment means. Patient aware he cannot be cured.     : Patient seen and examined following radiation appointment. Patient to undergo first radiation session on . Patient last febrile on  at 0330.     Disposition: Does not meet criteria for SNF/LTC. Patient to be discharged to family home.     Objective         Vitals Last 24 Hours:  TEMPERATURE:  Temp  Av.2 °F (37.3 °C)  Min: 98.1 °F (36.7 °C)  Max: 100.4 °F (38 °C)  RESPIRATIONS RANGE: Resp  Av.3  Min: 16  Max: 22  PULSE OXIMETRY RANGE: SpO2  Av.3 %  Min: 91 %  Max: 98 %  PULSE RANGE: Pulse  Av.5  Min: 106  Max: 121  BLOOD PRESSURE RANGE: Systolic (24hrs), Av , Min:110 , Max:118     ; Diastolic (24hrs), Av, Min:54, Max:67      I/O (24Hr):    Intake/Output Summary (Last 24 hours) at 2024 2133  Last data filed at 2024 1648  Gross per 24 hour   Intake 620 ml   Output 875 ml   Net -255 ml       /60   Pulse (!) 118   Temp 98.3 °F (36.8 °C) (Oral)   Resp 18   Ht 1.702 m (5' 7.01\")   Wt 56 kg (123 lb 7.3 oz)   SpO2 91%   BMI 19.33 kg/m²       General appearance - alert, ill appearing, cachectic, and in no distress  Neck - supple, no JVD, trachea is midline  Chest -diminished air entry noted in bases, no wheezes  Heart - S1 and S2 normal, tachycardia present  Abdomen - soft, nontender, nondistended, Bowel sounds present  Neurological -awake, follows over commands appropriately, moves all extremities, not in acute distress  Extremities - no pedal edema noted      Labs/Imaging/Diagnostics    Labs:  CBC:  Recent Labs     24  0226 24  0514 24  0319   WBC 18.6*  Masslike opacity versus consolidation in the left apex unchanged. 4.  Low lung volume with extensive opacities of the left hemithorax unchanged. Electronically signed by Jacob HOLLINGSWORTH Post    CT CHEST ABDOMEN PELVIS W CONTRAST Additional Contrast? Oral    Result Date: 10/30/2024  1. Redemonstration of significant irregular pleural thickening throughout the left hemithorax, greatest at the upper to mid lung. This is consistent with malignancy. Recommend correlation with recent biopsy results. -Confluent soft tissue at the left hilum may represent a mixture of the malignant pleural-based process and metastatic adenopathy. This causes narrowing of multiple pulmonary arteries and veins at the hilum as well as the left mainstem bronchus. This also partially encircles the distal aortic arch and descending thoracic aorta. 2. Significantly decreased size of the left pleural effusion status post chest tube placement. Small left pneumothorax likely related to the chest tube. -Rightward mediastinal shift has improved 3. Two 6 mm nodular densities right upper lobe are nonspecific. Recommend attention on follow-up. 4. Enlarged proximal left hilar node is likely metastatic. Mildly enlarged right hilar node may also be metastatic. Enlarged left subpectoral node and borderline enlarged left axillary node are also concerning for metastatic disease. 5. Indeterminate 1.9 cm left adrenal lesion is concerning for metastasis, though nonspecific. Recommend attention on follow-up. If clinical management would be altered, PET/CT could be used for further assessment. 6. Subcentimeter hypodense lesion in the left hepatic lobe is too small to characterize. Recommend close attention on follow-up. 7. Subcentimeter sclerotic focus right iliac bone is nonspecific. No clearly suspicious osseous lesions. Attention on follow-up. Electronically signed by Britt Osborn    XR CHEST PORTABLE    Result Date: 10/29/2024  No change of the left base tube

## 2024-11-14 NOTE — PLAN OF CARE
Problem: Occupational Therapy - Adult  Goal: By Discharge: Performs self-care activities at highest level of function for planned discharge setting.  See evaluation for individualized goals.  Description: Occupational Therapy Goals:  Initiated 10/25/2024, re-evaluated 10/31/2024, continue goals to be met within 7-10 days. Re-evaluated 11/10/24, continue goals to be met within 7-10 days.    1.  Patient will perform grooming with supervision/set-up standing at sink with good balance.   2.  Patient will perform bathing with supervision/set-up.  3.  Patient will perform lower body dressing with supervision/set-up.  4.  Patient will perform toilet transfers with supervision/set-up  5.  Patient will perform all aspects of toileting with supervision/set-up.  6.  Patient will participate in upper extremity therapeutic exercise/activities with supervision/set-up for 8-10 minutes to increase strength/endurance for ADLs.    7.  Patient will utilize energy conservation techniques during functional activities with verbal cues.    PLOF: Pt lives alone in shed, 2 steps to enter, tub only, previously independent with ADLs.      Outcome: Progressing   OCCUPATIONAL THERAPY TREATMENT    Patient: Delfin Shetty (68 y.o. 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  ASSESSMENT:  Pt seen for functional transfer/mobility training to bathroom this date. Pt remains tachycardic, 124 at rest and 130s w/activity and on 3L O2 nc. Reviewed safety w/RW and functional mobility w/O2 tubing mgt and importance of hand  assistance w/grab bars    Balance:  Balance  Sitting: Intact  Standing: Intact;With support  Standing - Static: Fair (fair plus)  Standing - Dynamic: Fair (fair plus)    ADL Intervention:  Feeding: Modified independent     Grooming: Stand by assistance  Hand hygiene standing sinkside    Toileting: Stand by assistance    Functional Mobility: Stand by assistance w/RW    Pain:  Intensity Pre-treatment: 0/10   Intensity Post-treatment: 0/10    Activity Tolerance:    Fair, pt fatigues easily    After treatment:   []  Patient left in no apparent distress sitting up in chair  [x]  Patient left in no apparent distress seated EOB  [x]  Call bell left within reach  []  Nursing notified  []  Caregiver present  []  Bed alarm activated    COMMUNICATION/EDUCATION:   Patient Education  Education Given To: Patient  Education Provided: Plan of Care;Energy Conservation  Education Method: Verbal;Teach Back  Barriers to Learning: None  Education Outcome: Continued education needed    Thank you for this referral.  TIM Wiggins  Minutes: 23

## 2024-11-14 NOTE — PROGRESS NOTES
[facility] Hospitalist Group  Progress Note  Date:2024       Room:[facility]  Patient Name:Delfin Shetty     YOB: 1955     Age:68 y.o.        Subjective      : Patient seen and examined. Discussed with him his need for FU with oncology once discharged - discussed what palliative treatment means. Patient aware he cannot be cured.     : Patient seen and examined following radiation appointment. Patient to undergo first radiation session on . Patient last febrile on  at 0330.     : patient to radiation today. Did well. Updated that he may be possibly swapped to oral antibiotics and discharged home tomorrow.     Disposition: Does not meet criteria for SNF/LTC. Patient to be discharged to family home.     Objective         Vitals Last 24 Hours:  TEMPERATURE:  Temp  Av °F (37.2 °C)  Min: 98.3 °F (36.8 °C)  Max: 100 °F (37.8 °C)  RESPIRATIONS RANGE: Resp  Av.4  Min: 16  Max: 20  PULSE OXIMETRY RANGE: SpO2  Av.8 %  Min: 91 %  Max: 99 %  PULSE RANGE: Pulse  Av.5  Min: 109  Max: 125  BLOOD PRESSURE RANGE: Systolic (24hrs), Av , Min:106 , Max:128     ; Diastolic (24hrs), Av, Min:54, Max:70      I/O (24Hr):    Intake/Output Summary (Last 24 hours) at 2024 1743  Last data filed at 2024 1053  Gross per 24 hour   Intake 618.1 ml   Output 300 ml   Net 318.1 ml       /66   Pulse (!) 109   Temp 99.6 °F (37.6 °C) (Oral)   Resp 20   Ht 1.702 m (5' 7.01\")   Wt 56 kg (123 lb 7.3 oz)   SpO2 92%   BMI 19.33 kg/m²       General appearance - alert, ill appearing, cachectic, and in no distress  Neck - supple, no JVD, trachea is midline  Chest -diminished air entry noted in bases, no wheezes  Heart - S1 and S2 normal, tachycardia present  Abdomen - soft, nontender, nondistended, Bowel sounds present  Neurological -awake, follows over commands appropriately, moves all extremities, not in acute distress  Extremities - no

## 2024-11-14 NOTE — PLAN OF CARE
Problem: Safety - Adult  Goal: Free from fall injury  Description: Gripper socks, call bell in reach  Outcome: Progressing  Note: Patient will remain free from falls by ensuring bed alarm is on, call bell is within reach and patients needs are satisfied. Remind patient to call for assistance when needed.      Problem: Skin/Tissue Integrity  Goal: Absence of new skin breakdown  Description: 1.  Monitor for areas of redness and/or skin breakdown  2.  Assess vascular access sites hourly  3.  Every 4-6 hours minimum:  Change oxygen saturation probe site  4.  Every 4-6 hours:  If on nasal continuous positive airway pressure, respiratory therapy assess nares and determine need for appliance change or resting period.  Outcome: Progressing  Note: Patient will not develop any new skin breakdown.  Patient will be turned every two hours.  Patient will be encouraged to reposition themselves.      Problem: Respiratory - Adult  Goal: Achieves optimal ventilation and oxygenation  Outcome: Progressing  Note: Monitor patients oxygen level to maintain adequate oxygenation.  Patient will receive nebulizer treatments as scheduled to decrease shortness of breath.      Problem: Skin/Tissue Integrity - Adult  Goal: Skin integrity remains intact  Outcome: Progressing  Note: Patient will not develop any new breakdown this admission.  Skin will be monitored for areas of redness.  Patient will be turned every 2 hours.      Problem: Chronic Conditions and Co-morbidities  Goal: Patient's chronic conditions and co-morbidity symptoms are monitored and maintained or improved  Outcome: Progressing  Note: Patient will start radiation treatments daily to reduce mass.       Problem: Nutrition Deficit:  Goal: Optimize nutritional status  Outcome: Progressing  Note: Encourage patient to eat meals and have supplements throughout the day.

## 2024-11-15 ENCOUNTER — HOSPITAL ENCOUNTER (OUTPATIENT)
Facility: HOSPITAL | Age: 69
Setting detail: RECURRING SERIES
Discharge: HOME | End: 2024-11-18
Attending: RADIOLOGY
Payer: MEDICARE

## 2024-11-15 VITALS
OXYGEN SATURATION: 92 % | DIASTOLIC BLOOD PRESSURE: 64 MMHG | TEMPERATURE: 98.2 F | WEIGHT: 123.46 LBS | RESPIRATION RATE: 20 BRPM | HEART RATE: 124 BPM | BODY MASS INDEX: 19.38 KG/M2 | HEIGHT: 67 IN | SYSTOLIC BLOOD PRESSURE: 134 MMHG

## 2024-11-15 LAB
RAD ONC ARIA COURSE FIRST TREATMENT DATE: NORMAL
RAD ONC ARIA COURSE ID: NORMAL
RAD ONC ARIA COURSE INTENT: NORMAL
RAD ONC ARIA COURSE LAST TREATMENT DATE: NORMAL
RAD ONC ARIA COURSE SESSION NUMBER: 2
RAD ONC ARIA COURSE START DATE: NORMAL
RAD ONC ARIA COURSE TREATMENT ELAPSED DAYS: 1
RAD ONC ARIA PLAN FRACTIONS TREATED TO DATE: 2
RAD ONC ARIA PLAN ID: NORMAL
RAD ONC ARIA PLAN PRESCRIBED DOSE PER FRACTION: 3 GY
RAD ONC ARIA PLAN PRIMARY REFERENCE POINT: NORMAL
RAD ONC ARIA PLAN TOTAL FRACTIONS PRESCRIBED: 10
RAD ONC ARIA PLAN TOTAL PRESCRIBED DOSE: 3000 CGY
RAD ONC ARIA REFERENCE POINT DOSAGE GIVEN TO DATE: 6 GY
RAD ONC ARIA REFERENCE POINT ID: NORMAL
RAD ONC ARIA REFERENCE POINT SESSION DOSAGE GIVEN: 3 GY

## 2024-11-15 PROCEDURE — 2580000003 HC RX 258: Performed by: PHYSICIAN ASSISTANT

## 2024-11-15 PROCEDURE — 6370000000 HC RX 637 (ALT 250 FOR IP): Performed by: STUDENT IN AN ORGANIZED HEALTH CARE EDUCATION/TRAINING PROGRAM

## 2024-11-15 PROCEDURE — 6370000000 HC RX 637 (ALT 250 FOR IP): Performed by: INTERNAL MEDICINE

## 2024-11-15 PROCEDURE — 77412 RADIATION TX DELIVERY LVL 3: CPT

## 2024-11-15 PROCEDURE — 6360000002 HC RX W HCPCS: Performed by: STUDENT IN AN ORGANIZED HEALTH CARE EDUCATION/TRAINING PROGRAM

## 2024-11-15 PROCEDURE — 99239 HOSP IP/OBS DSCHRG MGMT >30: CPT | Performed by: STUDENT IN AN ORGANIZED HEALTH CARE EDUCATION/TRAINING PROGRAM

## 2024-11-15 PROCEDURE — 94761 N-INVAS EAR/PLS OXIMETRY MLT: CPT

## 2024-11-15 PROCEDURE — 6360000002 HC RX W HCPCS: Performed by: PHYSICIAN ASSISTANT

## 2024-11-15 PROCEDURE — 94640 AIRWAY INHALATION TREATMENT: CPT

## 2024-11-15 PROCEDURE — 2700000000 HC OXYGEN THERAPY PER DAY

## 2024-11-15 RX ORDER — SACCHAROMYCES BOULARDII 250 MG
250 CAPSULE ORAL 2 TIMES DAILY
Qty: 28 CAPSULE | Refills: 0 | Status: SHIPPED | OUTPATIENT
Start: 2024-11-15 | End: 2024-11-29

## 2024-11-15 RX ORDER — DOXYCYCLINE 100 MG/1
100 CAPSULE ORAL 2 TIMES DAILY
Qty: 24 CAPSULE | Refills: 0 | Status: SHIPPED | OUTPATIENT
Start: 2024-11-15 | End: 2024-11-27

## 2024-11-15 RX ORDER — METOPROLOL TARTRATE 25 MG/1
25 TABLET, FILM COATED ORAL 2 TIMES DAILY
Qty: 60 TABLET | Refills: 3 | Status: SHIPPED | OUTPATIENT
Start: 2024-11-15

## 2024-11-15 RX ORDER — MIDODRINE HYDROCHLORIDE 2.5 MG/1
2.5 TABLET ORAL EVERY 8 HOURS PRN
Qty: 90 TABLET | Refills: 3 | Status: SHIPPED | OUTPATIENT
Start: 2024-11-15

## 2024-11-15 RX ORDER — OXYCODONE AND ACETAMINOPHEN 10; 325 MG/1; MG/1
1 TABLET ORAL EVERY 6 HOURS PRN
Qty: 12 TABLET | Refills: 0 | Status: SHIPPED | OUTPATIENT
Start: 2024-11-15 | End: 2024-11-15

## 2024-11-15 RX ORDER — OXYCODONE AND ACETAMINOPHEN 10; 325 MG/1; MG/1
1 TABLET ORAL EVERY 6 HOURS PRN
Qty: 12 TABLET | Refills: 0 | Status: SHIPPED | OUTPATIENT
Start: 2024-11-15 | End: 2024-11-18

## 2024-11-15 RX ADMIN — BUDESONIDE 1 MG: 1 SUSPENSION RESPIRATORY (INHALATION) at 10:28

## 2024-11-15 RX ADMIN — ENOXAPARIN SODIUM 40 MG: 100 INJECTION SUBCUTANEOUS at 12:46

## 2024-11-15 RX ADMIN — METOPROLOL TARTRATE 25 MG: 25 TABLET, FILM COATED ORAL at 12:47

## 2024-11-15 RX ADMIN — THERA TABS 1 TABLET: TAB at 12:47

## 2024-11-15 RX ADMIN — PANTOPRAZOLE SODIUM 40 MG: 40 TABLET, DELAYED RELEASE ORAL at 06:41

## 2024-11-15 RX ADMIN — ARFORMOTEROL TARTRATE 15 MCG: 15 SOLUTION RESPIRATORY (INHALATION) at 10:28

## 2024-11-15 RX ADMIN — DOXYCYCLINE HYCLATE 100 MG: 100 CAPSULE ORAL at 12:47

## 2024-11-15 RX ADMIN — SODIUM CHLORIDE, PRESERVATIVE FREE 10 ML: 5 INJECTION INTRAVENOUS at 12:46

## 2024-11-15 RX ADMIN — AMOXICILLIN AND CLAVULANATE POTASSIUM 1 TABLET: 875; 125 TABLET, FILM COATED ORAL at 12:47

## 2024-11-15 RX ADMIN — IPRATROPIUM BROMIDE 0.5 MG: 0.5 SOLUTION RESPIRATORY (INHALATION) at 10:28

## 2024-11-15 ASSESSMENT — PAIN SCALES - GENERAL
PAINLEVEL_OUTOF10: 0

## 2024-11-15 NOTE — PROGRESS NOTES
Walk test performed on patient. Patient did not need O2 during test. Patient was at 89% resting, 88% when sitting up, and 92% when ambulating on room air. No complaints of shortness of breath throughout test. Case management and MD made aware of results. Case management to follow up tomorrow regarding potential need for supplemental home oxygen.

## 2024-11-15 NOTE — PROGRESS NOTES
4 Eyes Skin Assessment     NAME:  Delfin Shetty  YOB: 1955  MEDICAL RECORD NUMBER:  [id number]    The patient is being assessed for  Shift Handoff    I agree that at least one RN has performed a thorough Head to Toe Skin Assessment on the patient. ALL assessment sites listed below have been assessed.      Areas assessed by both nurses:    Head, Face, Ears, Shoulders, Back, Chest, Arms, Elbows, Hands, Sacrum. Buttock, Coccyx, Ischium, Legs. Feet and Heels, and Under Medical Devices         Does the Patient have a Wound? No noted wound(s)       Valentin Prevention initiated by RN: Yes  Wound Care Orders initiated by RN: No    Pressure Injury (Stage 3,4, Unstageable, DTI, NWPT, and Complex wounds) if present, place Wound referral order by RN under : No    New Ostomies, if present place, Ostomy referral order under : No     Nurse 1 eSignature: Electronically signed by Mary Gant RN on 11/15/24 at 6:22 AM EST    **SHARE this note so that the co-signing nurse can place an eSignature**    Nurse 2 eSignature: Electronically signed by Megan Vo RN on 11/15/24 at 7:57 PM EST

## 2024-11-15 NOTE — PROGRESS NOTES
Infectious Disease Progress Note      Reconsulted for low grade fever, persistent leukocytosis        Current abx Prior abx   Pip/tazo since 10/23/24-10/29; restarted 11/10/24 Vancomycin 10/23-10/24     Lines:       Assessment :   68 y.o. male with a PMHx of tobacco abuse, HTN, HLD who presented to the ED on 10/23/24 with c/o SOB associated with cough and chest pain ongoing for the past 2 weeks.    recent SIRS due to malignant pleural effusion, probable post obstructive pneumonia  Status post thoracentesis, chest tube insertion on 10/24/2024.  No neutrophils seen on pleural fluid argues against empyema.  Pleural fluid cultures 10/24-no organisms  Biopsy positive for squamous cell carcinoma    Now with intermittent low grade fever, persistent leukocytosis  This likely SIRS due to progressive malignancy,   post obstructive pneumonia   Low procalcitonin argues against bacterial sepsis as the sole cause of patient's fever/leukocytosis    CT scan 11/30/2024 confirms clinical suspicion of progression of malignancy/postobstructive pneumonia    Positive nasal MRSA swab may suggest concomitant MRSA pulmonary infection.  Will modify antibiotics to cover for this possibility    Oncology follow-up appreciated.  Started on radiation treatment   continued improvement in fever/leukocytosis noted today    Remains on 2 L oxygen via nasal cannula.    Recommendations:    recommend oral Augmentin, doxycycline till 11/27/2024  Follow up oncology recommendations regarding lung cancer  Discharge planning per primary team.  Please note patient is at high risk for developing recurrent fever/postobstructive pneumonia due to presence of hilar mass/incurable pulmonary malignancy.     Above plan discussed in details with dr. Cavanaugh, RN, patient . Please call me if any further questions or concerns. thanks      HPI:  Feels better.  Eager to go home  Denies subjective fever, chills.  No pleuritic chest pain.  No abdominal pain, nausea, vomiting,

## 2024-11-15 NOTE — PLAN OF CARE
Problem: Discharge Planning  Goal: Discharge to home or other facility with appropriate resources  11/15/2024 1614 by Megan Vo RN  Outcome: Progressing  Flowsheets (Taken 11/15/2024 0800)  Discharge to home or other facility with appropriate resources:   Identify barriers to discharge with patient and caregiver   Arrange for needed discharge resources and transportation as appropriate   Identify discharge learning needs (meds, wound care, etc)  11/15/2024 0527 by Mary Gant RN  Outcome: Progressing  Flowsheets (Taken 11/15/2024 0519)  Discharge to home or other facility with appropriate resources:   Identify barriers to discharge with patient and caregiver   Identify discharge learning needs (meds, wound care, etc)     Problem: Safety - Adult  Goal: Free from fall injury  Description: Gripper socks, call bell in reach  11/15/2024 1614 by Megan Vo RN  Outcome: Progressing  Flowsheets (Taken 11/15/2024 0800)  Free From Fall Injury: Instruct family/caregiver on patient safety  11/15/2024 0527 by Mary Gant RN  Outcome: Progressing  Note: Patient gets up with assist from staff.     Problem: Skin/Tissue Integrity  Goal: Absence of new skin breakdown  Description: 1.  Monitor for areas of redness and/or skin breakdown  2.  Assess vascular access sites hourly  3.  Every 4-6 hours minimum:  Change oxygen saturation probe site  4.  Every 4-6 hours:  If on nasal continuous positive airway pressure, respiratory therapy assess nares and determine need for appliance change or resting period.  11/15/2024 1614 by Megan Vo RN  Outcome: Progressing  11/15/2024 0527 by Mary Gant RN  Outcome: Progressing  Note: Patient has maintained skin integrity throughout hospital stay.     Problem: Respiratory - Adult  Goal: Achieves optimal ventilation and oxygenation  11/15/2024 1614 by Megan Vo RN  Outcome: Progressing  Flowsheets (Taken 11/15/2024 0800)  Achieves optimal

## 2024-11-15 NOTE — DISCHARGE SUMMARY
malignant pleural effusion.  Status post CT-guided chest tube thoracostomy, lytics. Pleural effusion studies are exudative, lymphocytic and eosinophilic consistent with malignant effusion. Metasesis to left hilar node. MRI brain neg for mets  #Non-small cell Squamous cell carcinoma, poorly differentiated  #Eosinophilia  #Severe atelectasis secondary to pleural effusion  #Developing tension hydrothorax  #Acute hypoxic respiratory failure. 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  #Cachexia  Chronic:  #Hypertension  #Hyperlipidemia  #Tobacco abuse    Consults:     Significant Diagnostic Studies: {diagnostics:72412}    Discharge Medications:     Current Discharge Medication List        START taking these medications    Details   amoxicillin-clavulanate (AUGMENTIN) 875-125 MG per tablet Take 1 tablet by mouth every 12 hours for 12 days  Qty: 24 tablet, Refills: 0      doxycycline hyclate (VIBRAMYCIN) 100 MG capsule Take 1 capsule by mouth in the morning and at bedtime for 12 days  Qty: 24 capsule, Refills: 0      metoprolol tartrate (LOPRESSOR) 25 MG tablet Take 1 tablet by mouth 2 times daily  Qty: 60 tablet, Refills: 3      midodrine (PROAMATINE) 2.5 MG tablet Take 1 tablet by mouth every 8 hours as needed (For systolic blood pressure less than 110)  Qty: 90 tablet, Refills: 3      oxyCODONE-acetaminophen (PERCOCET)  MG per tablet Take 1 tablet by mouth every 6 hours as needed for Pain for up to 3 days. Max Daily Amount: 4 tablets  Qty: 12 tablet, Refills: 0    Comments: Reduce doses taken as pain becomes manageable  Associated Diagnoses: Carcinoma of left lung (HCC)      saccharomyces boulardii (FLORASTOR) 250 MG capsule Take 1 capsule by mouth 2 times daily for 14 days  Qty: 28 capsule, Refills: 0           CONTINUE these medications which have NOT CHANGED    Details   losartan-hydroCHLOROthiazide (HYZAAR) 100-25 MG per tablet Take 1 tablet by mouth daily

## 2024-11-15 NOTE — PROGRESS NOTES
4 Eyes Skin Assessment     NAME:  Delfin Shetty  YOB: 1955  MEDICAL RECORD NUMBER:  [id number]    The patient is being assessed for  Shift Handoff    I agree that at least one RN has performed a thorough Head to Toe Skin Assessment on the patient. ALL assessment sites listed below have been assessed.      Areas assessed by both nurses:    Head, Face, Ears, Shoulders, Back, Chest, Arms, Elbows, Hands, Sacrum. Buttock, Coccyx, Ischium, and Legs. Feet and Heels        Does the Patient have a Wound? No noted wound(s)       Valentin Prevention initiated by RN: Yes  Wound Care Orders initiated by RN: No    Pressure Injury (Stage 3,4, Unstageable, DTI, NWPT, and Complex wounds) if present, place Wound referral order by RN under : No    New Ostomies, if present place, Ostomy referral order under : No     Nurse 1 eSignature: Electronically signed by Garret Zavala RN on 11/14/24 at 8:06 PM EST    **SHARE this note so that the co-signing nurse can place an eSignature**    Nurse 2 eSignature: Electronically signed by Mary Gant RN on 11/15/24 at 6:20 AM EST

## 2024-11-15 NOTE — CARE COORDINATION
11/15/24 1308   IMM Letter   IMM Letter given to Patient/Family/Significant other/Guardian/POA/by: Monika Mena RN,    IMM Letter date given: 11/15/24   IMM Letter time given: 1308  (Patient waived 4 hour notice of discharge requirement from Medicare)       Medicare pt has received, reviewed, and signed 2nd IM letter informing them of their right to appeal the discharge.  Signed copy has been placed on pt bedside chart.     Monika eMna, MSN, RN  Care Manager    [place] [address]  Office: [phone]  Fax: [phone]     [facility] Care Managers are on-call evenings from 4:30 pm until 7:00 pm. After 7:00 pm, please contact Nurse’s Admin at ext. [phone] for LYFT rides only.

## 2024-11-15 NOTE — PLAN OF CARE
Problem: Discharge Planning  Goal: Discharge to home or other facility with appropriate resources  11/15/2024 0527 by Mary Gant, RN  Outcome: Progressing  Flowsheets (Taken 11/15/2024 0519)  Discharge to home or other facility with appropriate resources:   Identify barriers to discharge with patient and caregiver   Identify discharge learning needs (meds, wound care, etc)     Problem: Safety - Adult  Goal: Free from fall injury  Description: Gripper socks, call bell in reach  11/15/2024 0527 by Mary Gant, RN  Outcome: Progressing  Note: Patient gets up with assist from staff.     Problem: Skin/Tissue Integrity  Goal: Absence of new skin breakdown  Description: 1.  Monitor for areas of redness and/or skin breakdown  2.  Assess vascular access sites hourly  3.  Every 4-6 hours minimum:  Change oxygen saturation probe site  4.  Every 4-6 hours:  If on nasal continuous positive airway pressure, respiratory therapy assess nares and determine need for appliance change or resting period.  Outcome: Progressing  Note: Patient has maintained skin integrity throughout hospital stay.     Problem: Respiratory - Adult  Goal: Achieves optimal ventilation and oxygenation  11/15/2024 0527 by Mary Gant, RN  Outcome: Progressing  Flowsheets (Taken 11/15/2024 0519)  Achieves optimal ventilation and oxygenation:   Assess for changes in respiratory status   Position to facilitate oxygenation and minimize respiratory effort  Note: Patient is currently on 3 liters of O2 per nasal cannula.     Problem: Cardiovascular - Adult  Goal: Maintains optimal cardiac output and hemodynamic stability  11/15/2024 0527 by Mary Gant, RN  Outcome: Progressing  Flowsheets (Taken 11/15/2024 0519)  Maintains optimal cardiac output and hemodynamic stability:   Monitor blood pressure and heart rate   Monitor urine output and notify Licensed Independent Practitioner for values outside of normal range

## 2024-11-15 NOTE — CARE COORDINATION
Patient needs to be weaned off O2. Bedside RN is aware. Patient stated his cousin will pick him up. CM will continue to follow for possible need of O2. Patient currently lives in a shed and if patient needs Oxygen then this will delay discharge. CM will follow up.      1542-CM was informed that patient passed his O2 walk test and does not need oxygen. Patient friend cousin just arrived to provide transport. Discharge order noted for today.  Orders reviewed.  No needs identified at this time    Monika Mena, MSN, RN  Care Manager    [place] [address]  Office: [phone]  Fax: [phone]     [facility] Care Managers are on-call evenings from 4:30 pm until 7:00 pm. After 7:00 pm, please contact Nurse’s Admin at ext. [phone] for LYFT rides only.

## 2024-11-16 NOTE — PROGRESS NOTES
Heplock removed. Telemetry removed. Discharge instructions review with patient. Patient verbalized understanding. To exit via wheelchair for discharge home.

## 2024-11-16 NOTE — PROGRESS NOTES
4 Eyes Skin Assessment     NAME:  Delfin Shetty  YOB: 1955  MEDICAL RECORD NUMBER:  [id number]    The patient is being assessed for  Shift Handoff    I agree that at least one RN has performed a thorough Head to Toe Skin Assessment on the patient. ALL assessment sites listed below have been assessed.      Areas assessed by both nurses:    Head, Face, Ears, Shoulders, Back, Chest, Arms, Elbows, Hands, Sacrum. Buttock, Coccyx, Ischium, Legs. Feet and Heels, and Under Medical Devices         Does the Patient have a Wound? No noted wound(s)       Valentin Prevention initiated by RN: Yes  Wound Care Orders initiated by RN: No    Pressure Injury (Stage 3,4, Unstageable, DTI, NWPT, and Complex wounds) if present, place Wound referral order by RN under : No    New Ostomies, if present place, Ostomy referral order under : No     Nurse 1 eSignature: Electronically signed by Megan Vo RN on 11/15/24 at 7:58 PM EST    **SHARE this note so that the co-signing nurse can place an eSignature**    Nurse 2 eSignature: Electronically signed by ROMY JULIO RN on 11/15/24 at 8:12 PM EST

## 2024-11-18 ENCOUNTER — HOSPITAL ENCOUNTER (OUTPATIENT)
Facility: HOSPITAL | Age: 69
Setting detail: RECURRING SERIES
Discharge: HOME | End: 2024-11-21
Attending: RADIOLOGY
Payer: MEDICARE

## 2024-11-18 LAB
BACTERIA SPEC CULT: NORMAL
RAD ONC ARIA COURSE FIRST TREATMENT DATE: NORMAL
RAD ONC ARIA COURSE ID: NORMAL
RAD ONC ARIA COURSE INTENT: NORMAL
RAD ONC ARIA COURSE LAST TREATMENT DATE: NORMAL
RAD ONC ARIA COURSE SESSION NUMBER: 3
RAD ONC ARIA COURSE START DATE: NORMAL
RAD ONC ARIA COURSE TREATMENT ELAPSED DAYS: 4
RAD ONC ARIA PLAN FRACTIONS TREATED TO DATE: 3
RAD ONC ARIA PLAN ID: NORMAL
RAD ONC ARIA PLAN PRESCRIBED DOSE PER FRACTION: 3 GY
RAD ONC ARIA PLAN PRIMARY REFERENCE POINT: NORMAL
RAD ONC ARIA PLAN TOTAL FRACTIONS PRESCRIBED: 10
RAD ONC ARIA PLAN TOTAL PRESCRIBED DOSE: 3000 CGY
RAD ONC ARIA REFERENCE POINT DOSAGE GIVEN TO DATE: 9 GY
RAD ONC ARIA REFERENCE POINT ID: NORMAL
RAD ONC ARIA REFERENCE POINT SESSION DOSAGE GIVEN: 3 GY
SERVICE CMNT-IMP: NORMAL

## 2024-11-18 PROCEDURE — 77412 RADIATION TX DELIVERY LVL 3: CPT

## 2024-11-19 ENCOUNTER — APPOINTMENT (OUTPATIENT)
Facility: HOSPITAL | Age: 69
End: 2024-11-19
Attending: RADIOLOGY
Payer: MEDICARE

## 2024-11-20 ENCOUNTER — APPOINTMENT (OUTPATIENT)
Facility: HOSPITAL | Age: 69
End: 2024-11-20
Attending: RADIOLOGY
Payer: MEDICARE

## 2024-11-20 ENCOUNTER — HOSPITAL ENCOUNTER (OUTPATIENT)
Facility: HOSPITAL | Age: 69
Setting detail: RECURRING SERIES
Discharge: HOME | End: 2024-11-23
Attending: RADIOLOGY
Payer: MEDICARE

## 2024-11-20 LAB
RAD ONC ARIA COURSE FIRST TREATMENT DATE: NORMAL
RAD ONC ARIA COURSE ID: NORMAL
RAD ONC ARIA COURSE INTENT: NORMAL
RAD ONC ARIA COURSE LAST TREATMENT DATE: NORMAL
RAD ONC ARIA COURSE SESSION NUMBER: 4
RAD ONC ARIA COURSE START DATE: NORMAL
RAD ONC ARIA COURSE TREATMENT ELAPSED DAYS: 6
RAD ONC ARIA PLAN FRACTIONS TREATED TO DATE: 4
RAD ONC ARIA PLAN ID: NORMAL
RAD ONC ARIA PLAN PRESCRIBED DOSE PER FRACTION: 3 GY
RAD ONC ARIA PLAN PRIMARY REFERENCE POINT: NORMAL
RAD ONC ARIA PLAN TOTAL FRACTIONS PRESCRIBED: 10
RAD ONC ARIA PLAN TOTAL PRESCRIBED DOSE: 3000 CGY
RAD ONC ARIA REFERENCE POINT DOSAGE GIVEN TO DATE: 12 GY
RAD ONC ARIA REFERENCE POINT ID: NORMAL
RAD ONC ARIA REFERENCE POINT SESSION DOSAGE GIVEN: 3 GY

## 2024-11-20 PROCEDURE — 77412 RADIATION TX DELIVERY LVL 3: CPT

## 2024-11-21 ENCOUNTER — HOSPITAL ENCOUNTER (OUTPATIENT)
Facility: HOSPITAL | Age: 69
Setting detail: RECURRING SERIES
Discharge: HOME | End: 2024-11-24
Attending: RADIOLOGY
Payer: MEDICARE

## 2024-11-21 ENCOUNTER — APPOINTMENT (OUTPATIENT)
Facility: HOSPITAL | Age: 69
End: 2024-11-21
Attending: RADIOLOGY
Payer: MEDICARE

## 2024-11-21 LAB
RAD ONC ARIA COURSE FIRST TREATMENT DATE: NORMAL
RAD ONC ARIA COURSE ID: NORMAL
RAD ONC ARIA COURSE INTENT: NORMAL
RAD ONC ARIA COURSE LAST TREATMENT DATE: NORMAL
RAD ONC ARIA COURSE SESSION NUMBER: 5
RAD ONC ARIA COURSE START DATE: NORMAL
RAD ONC ARIA COURSE TREATMENT ELAPSED DAYS: 7
RAD ONC ARIA PLAN FRACTIONS TREATED TO DATE: 5
RAD ONC ARIA PLAN ID: NORMAL
RAD ONC ARIA PLAN PRESCRIBED DOSE PER FRACTION: 3 GY
RAD ONC ARIA PLAN PRIMARY REFERENCE POINT: NORMAL
RAD ONC ARIA PLAN TOTAL FRACTIONS PRESCRIBED: 10
RAD ONC ARIA PLAN TOTAL PRESCRIBED DOSE: 3000 CGY
RAD ONC ARIA REFERENCE POINT DOSAGE GIVEN TO DATE: 15 GY
RAD ONC ARIA REFERENCE POINT ID: NORMAL
RAD ONC ARIA REFERENCE POINT SESSION DOSAGE GIVEN: 3 GY

## 2024-11-21 PROCEDURE — 77336 RADIATION PHYSICS CONSULT: CPT

## 2024-11-21 PROCEDURE — 77412 RADIATION TX DELIVERY LVL 3: CPT

## 2024-11-22 ENCOUNTER — APPOINTMENT (OUTPATIENT)
Facility: HOSPITAL | Age: 69
End: 2024-11-22
Attending: RADIOLOGY
Payer: MEDICARE

## 2024-11-25 ENCOUNTER — HOSPITAL ENCOUNTER (OUTPATIENT)
Facility: HOSPITAL | Age: 69
Setting detail: RECURRING SERIES
Discharge: HOME | End: 2024-11-28
Attending: RADIOLOGY
Payer: MEDICARE

## 2024-11-25 ENCOUNTER — APPOINTMENT (OUTPATIENT)
Facility: HOSPITAL | Age: 69
End: 2024-11-25
Attending: RADIOLOGY
Payer: MEDICARE

## 2024-11-25 LAB
BACTERIA SPEC CULT: NORMAL
RAD ONC ARIA COURSE FIRST TREATMENT DATE: NORMAL
RAD ONC ARIA COURSE ID: NORMAL
RAD ONC ARIA COURSE INTENT: NORMAL
RAD ONC ARIA COURSE LAST TREATMENT DATE: NORMAL
RAD ONC ARIA COURSE SESSION NUMBER: 6
RAD ONC ARIA COURSE START DATE: NORMAL
RAD ONC ARIA COURSE TREATMENT ELAPSED DAYS: 11
RAD ONC ARIA PLAN FRACTIONS TREATED TO DATE: 6
RAD ONC ARIA PLAN ID: NORMAL
RAD ONC ARIA PLAN PRESCRIBED DOSE PER FRACTION: 3 GY
RAD ONC ARIA PLAN PRIMARY REFERENCE POINT: NORMAL
RAD ONC ARIA PLAN TOTAL FRACTIONS PRESCRIBED: 10
RAD ONC ARIA PLAN TOTAL PRESCRIBED DOSE: 3000 CGY
RAD ONC ARIA REFERENCE POINT DOSAGE GIVEN TO DATE: 18 GY
RAD ONC ARIA REFERENCE POINT ID: NORMAL
RAD ONC ARIA REFERENCE POINT SESSION DOSAGE GIVEN: 3 GY
SERVICE CMNT-IMP: NORMAL

## 2024-11-25 PROCEDURE — 77417 THER RADIOLOGY PORT IMAGE(S): CPT

## 2024-11-25 PROCEDURE — 77412 RADIATION TX DELIVERY LVL 3: CPT

## 2024-11-26 ENCOUNTER — APPOINTMENT (OUTPATIENT)
Facility: HOSPITAL | Age: 69
End: 2024-11-26
Attending: RADIOLOGY
Payer: MEDICARE

## 2024-11-26 ENCOUNTER — HOSPITAL ENCOUNTER (OUTPATIENT)
Facility: HOSPITAL | Age: 69
Setting detail: RECURRING SERIES
Discharge: HOME | End: 2024-11-29
Attending: RADIOLOGY
Payer: MEDICARE

## 2024-11-26 LAB
RAD ONC ARIA COURSE FIRST TREATMENT DATE: NORMAL
RAD ONC ARIA COURSE ID: NORMAL
RAD ONC ARIA COURSE INTENT: NORMAL
RAD ONC ARIA COURSE LAST TREATMENT DATE: NORMAL
RAD ONC ARIA COURSE SESSION NUMBER: 7
RAD ONC ARIA COURSE START DATE: NORMAL
RAD ONC ARIA COURSE TREATMENT ELAPSED DAYS: 12
RAD ONC ARIA PLAN FRACTIONS TREATED TO DATE: 7
RAD ONC ARIA PLAN ID: NORMAL
RAD ONC ARIA PLAN PRESCRIBED DOSE PER FRACTION: 3 GY
RAD ONC ARIA PLAN PRIMARY REFERENCE POINT: NORMAL
RAD ONC ARIA PLAN TOTAL FRACTIONS PRESCRIBED: 10
RAD ONC ARIA PLAN TOTAL PRESCRIBED DOSE: 3000 CGY
RAD ONC ARIA REFERENCE POINT DOSAGE GIVEN TO DATE: 21 GY
RAD ONC ARIA REFERENCE POINT ID: NORMAL
RAD ONC ARIA REFERENCE POINT SESSION DOSAGE GIVEN: 3 GY

## 2024-11-26 PROCEDURE — 77412 RADIATION TX DELIVERY LVL 3: CPT

## 2024-11-27 ENCOUNTER — HOSPITAL ENCOUNTER (OUTPATIENT)
Facility: HOSPITAL | Age: 69
Setting detail: RECURRING SERIES
Discharge: HOME | End: 2024-11-30
Attending: RADIOLOGY
Payer: MEDICARE

## 2024-11-27 ENCOUNTER — APPOINTMENT (OUTPATIENT)
Facility: HOSPITAL | Age: 69
End: 2024-11-27
Attending: RADIOLOGY
Payer: MEDICARE

## 2024-11-27 LAB
RAD ONC ARIA COURSE FIRST TREATMENT DATE: NORMAL
RAD ONC ARIA COURSE ID: NORMAL
RAD ONC ARIA COURSE INTENT: NORMAL
RAD ONC ARIA COURSE LAST TREATMENT DATE: NORMAL
RAD ONC ARIA COURSE SESSION NUMBER: 8
RAD ONC ARIA COURSE START DATE: NORMAL
RAD ONC ARIA COURSE TREATMENT ELAPSED DAYS: 13
RAD ONC ARIA PLAN FRACTIONS TREATED TO DATE: 8
RAD ONC ARIA PLAN ID: NORMAL
RAD ONC ARIA PLAN PRESCRIBED DOSE PER FRACTION: 3 GY
RAD ONC ARIA PLAN PRIMARY REFERENCE POINT: NORMAL
RAD ONC ARIA PLAN TOTAL FRACTIONS PRESCRIBED: 10
RAD ONC ARIA PLAN TOTAL PRESCRIBED DOSE: 3000 CGY
RAD ONC ARIA REFERENCE POINT DOSAGE GIVEN TO DATE: 24 GY
RAD ONC ARIA REFERENCE POINT ID: NORMAL
RAD ONC ARIA REFERENCE POINT SESSION DOSAGE GIVEN: 3 GY

## 2024-11-27 PROCEDURE — 77412 RADIATION TX DELIVERY LVL 3: CPT

## 2024-12-02 ENCOUNTER — APPOINTMENT (OUTPATIENT)
Facility: HOSPITAL | Age: 69
End: 2024-12-02
Attending: RADIOLOGY
Payer: MEDICARE

## 2024-12-02 ENCOUNTER — HOSPITAL ENCOUNTER (OUTPATIENT)
Facility: HOSPITAL | Age: 69
Setting detail: RECURRING SERIES
Discharge: HOME | End: 2024-12-05
Attending: RADIOLOGY
Payer: MEDICARE

## 2024-12-02 LAB
RAD ONC ARIA COURSE FIRST TREATMENT DATE: NORMAL
RAD ONC ARIA COURSE ID: NORMAL
RAD ONC ARIA COURSE INTENT: NORMAL
RAD ONC ARIA COURSE LAST TREATMENT DATE: NORMAL
RAD ONC ARIA COURSE SESSION NUMBER: 9
RAD ONC ARIA COURSE START DATE: NORMAL
RAD ONC ARIA COURSE TREATMENT ELAPSED DAYS: 18
RAD ONC ARIA PLAN FRACTIONS TREATED TO DATE: 9
RAD ONC ARIA PLAN ID: NORMAL
RAD ONC ARIA PLAN PRESCRIBED DOSE PER FRACTION: 3 GY
RAD ONC ARIA PLAN PRIMARY REFERENCE POINT: NORMAL
RAD ONC ARIA PLAN TOTAL FRACTIONS PRESCRIBED: 10
RAD ONC ARIA PLAN TOTAL PRESCRIBED DOSE: 3000 CGY
RAD ONC ARIA REFERENCE POINT DOSAGE GIVEN TO DATE: 27 GY
RAD ONC ARIA REFERENCE POINT ID: NORMAL
RAD ONC ARIA REFERENCE POINT SESSION DOSAGE GIVEN: 3 GY

## 2024-12-02 PROCEDURE — 77412 RADIATION TX DELIVERY LVL 3: CPT

## 2024-12-03 ENCOUNTER — APPOINTMENT (OUTPATIENT)
Facility: HOSPITAL | Age: 69
End: 2024-12-03
Attending: RADIOLOGY
Payer: MEDICARE

## 2024-12-03 ENCOUNTER — HOSPITAL ENCOUNTER (OUTPATIENT)
Facility: HOSPITAL | Age: 69
Setting detail: RECURRING SERIES
Discharge: HOME | End: 2024-12-06
Attending: RADIOLOGY
Payer: MEDICARE

## 2024-12-03 LAB
RAD ONC ARIA COURSE FIRST TREATMENT DATE: NORMAL
RAD ONC ARIA COURSE ID: NORMAL
RAD ONC ARIA COURSE INTENT: NORMAL
RAD ONC ARIA COURSE LAST TREATMENT DATE: NORMAL
RAD ONC ARIA COURSE SESSION NUMBER: 10
RAD ONC ARIA COURSE START DATE: NORMAL
RAD ONC ARIA COURSE TREATMENT ELAPSED DAYS: 19
RAD ONC ARIA PLAN FRACTIONS TREATED TO DATE: 10
RAD ONC ARIA PLAN ID: NORMAL
RAD ONC ARIA PLAN PRESCRIBED DOSE PER FRACTION: 3 GY
RAD ONC ARIA PLAN PRIMARY REFERENCE POINT: NORMAL
RAD ONC ARIA PLAN TOTAL FRACTIONS PRESCRIBED: 10
RAD ONC ARIA PLAN TOTAL PRESCRIBED DOSE: 3000 CGY
RAD ONC ARIA REFERENCE POINT DOSAGE GIVEN TO DATE: 30 GY
RAD ONC ARIA REFERENCE POINT ID: NORMAL
RAD ONC ARIA REFERENCE POINT SESSION DOSAGE GIVEN: 3 GY

## 2024-12-03 PROCEDURE — 77412 RADIATION TX DELIVERY LVL 3: CPT

## 2024-12-04 ENCOUNTER — APPOINTMENT (OUTPATIENT)
Facility: HOSPITAL | Age: 69
End: 2024-12-04
Attending: RADIOLOGY
Payer: MEDICARE

## 2024-12-08 LAB
ACID FAST STN SPEC: NEGATIVE
MYCOBACTERIUM SPEC QL CULT: NEGATIVE
SPECIMEN PREPARATION: NORMAL
SPECIMEN SOURCE: NORMAL

## 2025-02-04 ENCOUNTER — TRANSCRIBE ORDERS (OUTPATIENT)
Facility: HOSPITAL | Age: 70
End: 2025-02-04

## 2025-02-04 DIAGNOSIS — C34.12 MALIGNANT NEOPLASM OF UPPER LOBE BRONCHUS, LEFT (HCC): Primary | ICD-10-CM

## 2025-03-31 NOTE — PROCEDURES
PRIMARY CARE PHYSICIAN: JULY Head  REFERRING PROVIDER: JULY Head  [address] [facility] [address]  [place] [address]     CONSULT ORTHOPAEDIC: Knee Evaluation    ASSESSMENT & PLAN    Impression: 58 y.o. male with advanced bilateral knee osteoarthritis.    Plan:   I explained to the patient the nature of their diagnosis.  I reviewed their imaging studies with them.    Based on the history, physical exam and imaging studies above, the patient's presentation is consistent with the above diagnosis.  I had a long discussion with the patient regarding their presentation and the treatment options.  We discussed initial nonoperative versus operative management options as well as potential further diagnostic imaging.  I reviewed his x-ray findings with him.  He has bilateral advanced knee osteoarthritis.  He has tricompartmental degenerative changes with bone-on-bone osteoarthritis in the medial compartment bilaterally.  This affects all of his daily activities.  He states that the left side is more symptomatic than the right.  We discussed his treatment options going forward.  I do believe at this point he would benefit from a total knee arthroplasty likely on both sides in staged fashion as he has failed extensive nonoperative management including bracing, injections, anti-inflammatories and physical therapy.  I thoroughly explained the risks and benefits as well as the expected postoperative timeline for the proposed procedure versus nonoperative management. Risks of this procedure include but are not limited to bleeding, infection, nerve injury, DVT/PE and failure of repair or implant.  The patient expressed understanding and wished to think things through regarding surgical invention.  He will call the office if he elects to proceed.  We will start with the more symptomatic knee which at this point is his left knee.    Follow-Up: Patient will follow-up as  PROCEDURE NOTE  Date: 10/28/2024   Name: Delfin Shetty  YOB: 1955    Procedures    [facility]  Pulmonary, Critical Care, and Sleep Medicine  TPA/Dornase instillation via Chest Tube Procedure     Indication/ pre procedure diagnosis: empyema   Post procedure diagnosis: same     Chest tube clamped. Aseptic technique was observed. Dornase hugh 5 mg/30ml followed by Alteplase 10 mg/30ml instilled into /LEFT pleural cavity via self sealing chest tube.   Instructions given to nursing to reposition pt q 15 minutes x4, and unclamp tube and place back on wall suction at 20mmhg after 1 hour at 3:35 pm.  Pt tolerated procedure well.     Joesph Jenkins PA-C  10/28/24  Pulmonary, Critical Care Medicine  [facility]           needed    At the end of the visit, all questions were answered in full. The patient is in agreement with the plan and recommendations. They will call the office with any questions/concerns.    Note dictated with Down software. Completed without full typed error editing and sent to avoid delay.       SUBJECTIVE  CHIEF COMPLAINT: Bilateral knee pain    HPI: Aaron Farr is a 58 y.o. male who presents today with Bilateral Knee Pain. Patient having ongoing pain for several years. Has had previous arthroscopic surgery done in 2009 on right knee done by . Pain has progressed over the last year. Patient was previously injected in both knees last injection 12/24. States the weren't helpful. Patient prescribed meloxicam which he states is helpful. States left worse than the right.  He has pain, swelling and crepitus in both knees.  It affects all of his daily activities and limits him from doing things that he enjoys as well as his work.  While the majority of his pain is medial, he has pain globally in the knees with associated crepitus on range of motion.  He has tried physical therapy, bracing, injections and anti-inflammatories without significant improvement.    They deny any constant or progressive numbness or tingling in their legs.     REVIEW OF SYSTEMS  Constitutional: See HPI for pain assessment, No significant weight loss, recent trauma  Cardiovascular: No chest pain, shortness of breath  Respiratory: No difficulty breathing, cough  Gastrointestinal: No nausea, vomiting, diarrhea, constipation  Musculoskeletal: Noted in HPI, positive for pain, restricted motion, stiffness  Integumentary: No rashes, easy bruising, redness   Neurological: no numbness or tingling in extremities, no gait disturbances   Psychiatric: No mood changes, memory changes, social issues  Heme/Lymph: no excessive swelling, easy bruising, excessive bleeding  ENT: No hearing changes  Eyes: No vision  changes    Past Medical History:   Diagnosis Date    Abnormal renal function 06/26/2024    Acute sinusitis 06/26/2024    Biceps tendonitis 03/26/2012    Cellulitis of forearm 06/26/2024    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    Obesity 06/26/2024    Obesity with body mass index 30 or greater 06/26/2024    Rotator cuff tear 01/13/2014    Severe obesity (Multi) 06/26/2024    Shoulder impingement 03/26/2012    Shoulder pain 03/26/2012    Tinea cruris 06/26/2024        No Known Allergies     Past Surgical History:   Procedure Laterality Date    OTHER SURGICAL HISTORY  02/16/2021    Knee surgery        Family History   Problem Relation Name Age of Onset    Breast cancer Mother      Colon cancer Father          Social History     Socioeconomic History    Marital status:      Spouse name: Not on file    Number of children: Not on file    Years of education: Not on file    Highest education level: Not on file   Occupational History    Not on file   Tobacco Use    Smoking status: Never    Smokeless tobacco: Never   Substance and Sexual Activity    Alcohol use: Never    Drug use: Never    Sexual activity: Not on file   Other Topics Concern    Not on file   Social History Narrative    Not on file     Social Drivers of Health     Financial Resource Strain: Not on file   Food Insecurity: Not on file   Transportation Needs: Not on file   Physical Activity: Not on file   Stress: Not on file   Social Connections: Not on file   Intimate Partner Violence: Not on file   Housing Stability: Not on file        CURRENT MEDICATIONS:   Current Outpatient Medications   Medication Sig Dispense Refill    lisinopril 40 mg tablet Take 1 tablet (40 mg) by mouth once daily. 100 tablet 3    meloxicam (Mobic) 7.5 mg tablet Take 1 tablet (7.5 mg) by mouth once daily. 30 tablet 11     No current facility-administered medications for this visit.        OBJECTIVE    PHYSICAL EXAM      1/23/2023     3:25 PM 6/20/2024    11:42 AM 6/26/2024     2:16 PM  "11/19/2024     9:53 AM 2/2/2025     5:42 PM 2/21/2025     9:23 AM 2/21/2025     9:44 AM   Vitals   Systolic 128 157 136 131 134 165 145   Diastolic 92 109 91 90 101 109 97   Heart Rate  77 83 74 98 80    Temp     36.9 °C (98.5 °F)     Resp     16     Height  1.829 m (6') 1.829 m (6') 1.829 m (6' 0.01\")  1.829 m (6' 0.01\")    Weight (lb)  284.2 287.6 285  290    BMI  38.54 kg/m2 39.01 kg/m2 38.64 kg/m2  39.32 kg/m2    BSA (m2)  2.56 m2 2.57 m2 2.56 m2  2.59 m2    Visit Report  Report Report Report Report Report Report      There is no height or weight on file to calculate BMI.    GENERAL: A/Ox3, NAD. Appears healthy, well nourished  PSYCHIATRIC: Mood stable, appropriate memory recall  EYES: EOM intact, no scleral icterus  CARDIOVASCULAR: Palpable peripheral pulses  LUNGS: Breathing non-labored on room air  SKIN: no erythema, rashes, or ecchymoses     MUSCULOSKELETAL:  Laterality: bilateral Knee Exam  - Skin intact  - No erythema or warmth  - No ecchymosis or soft tissue swelling  - Alignment: varus  - Palpation: Positive tenderness medial and lateral joint lines, positive tenderness medial and lateral patellar facets  - ROM: 0-5-115, patella mobility 1+ medial and lateral with crepitus  - Effusion: Small  - Strength: knee extension and flexion 5/5, EHL/PF/DF motor intact  - Stability:        Anterior drawer stable       Posterior drawer stable       Varus/valgus stable       negative Lachman  - positive Avi's  - Gait: Antalgic  - Hip Exam: flexion to 100+ degrees, full extension, internal/external rotation adequate, and no pain with log roll  - Special Tests: Slight varus thrust on ambulation    NEUROVASCULAR:  - Neurovascular Status: sensation intact to light touch distally, lower extremity motor intact  - Capillary refill brisk at extremities, Bilateral dorsalis pedis pulse 2+    Imaging: Multiple views of the affected bilateral knee(s) demonstrate: Advanced tricompartmental degenerative changes with " bone-on-bone osteoarthritis in the medial compartment.   X-rays were personally reviewed and interpreted by me.  Radiology reports were reviewed by me as well, if readily available at the time.      Kenji Layne MD  Attending Surgeon    Sports Medicine Orthopaedic Surgery  [facility] [place]  [facility] [employer]

## 2025-06-24 NOTE — H&P
History and Physical          Subjective     HPI: Delfin Shetty is a 68 y.o. male with a PMHx of tobacco abuse, HTN, HLD who presented to the ED with c/o SOB associated with cough and chest pain ongoing for the past 2 weeks. SOB is worse with exertion, and does britni with rest. Cough is more chronic. Chest pain is located to left lower and left lateral chest and only occurs with deep breaths/cough. He denies fever, chills, abd pain, nvd, leg swelling. He reports not eating very well recently due to early satiety, but he drinks about 4 bottles of water daily.     In the ED, RR 30-40, -140s, BP ranging 155/75 to 104/60. He was initially 88% on room air, he was started on nasal cannula. Labs with K 3.1, procal 0.21, lactic 2.11 -> 1.27, alb 2.2, TSH 0.32, WBC 18.8, hgb 11.1, plts 634, eos 29%. CTA chest reveals huge relatively loculated subpulmonic pleural effusion in the lower chest depressing and inverting the left hemidiaphragm and displacing the abdominal contents. There is complete atelectasis of the left lower lobe and much of the lingula. There is a combination of atelectasis and peripheral consolidation involving the left upper lobe with additional peripheral somewhat loculated fluid. There is a somewhat masslike consolidation in the lateral left apex which is somewhat concerning for possible mass and should be monitored on follow-up. Diffuse emphysematous and some bullous changes are seen in the right lung. The mediastinal contents have been shifted completely into the right chest.     PMHx:  Past Medical History:   Diagnosis Date    HLD (hyperlipidemia) 10/23/2024    Primary hypertension 10/23/2024       PSurgHx:  No past surgical history on file.    SocialHx:  Social History     Socioeconomic History    Marital status: Single       FamilyHx:  No family history on file.    Home Medications:  Prior to Admission medications    Medication Sig Start Date End Date Taking? Authorizing Provider 
Detail Level: Generalized
Sunscreen Recommendations: Spf 30 Qd
Detail Level: Detailed